# Patient Record
Sex: FEMALE | Race: WHITE | NOT HISPANIC OR LATINO | Employment: PART TIME | URBAN - METROPOLITAN AREA
[De-identification: names, ages, dates, MRNs, and addresses within clinical notes are randomized per-mention and may not be internally consistent; named-entity substitution may affect disease eponyms.]

---

## 2017-01-01 ENCOUNTER — HOME CARE/HOSPICE - HEALTHEAST (OUTPATIENT)
Dept: HOME HEALTH SERVICES | Facility: HOME HEALTH | Age: 27
End: 2017-01-01

## 2017-01-01 ENCOUNTER — TRANSFERRED RECORDS (OUTPATIENT)
Dept: HEALTH INFORMATION MANAGEMENT | Facility: CLINIC | Age: 27
End: 2017-01-01

## 2017-01-05 ENCOUNTER — TRANSFERRED RECORDS (OUTPATIENT)
Dept: HEALTH INFORMATION MANAGEMENT | Facility: CLINIC | Age: 27
End: 2017-01-05

## 2017-01-07 ENCOUNTER — COMMUNICATION - HEALTHEAST (OUTPATIENT)
Dept: OBGYN | Facility: CLINIC | Age: 27
End: 2017-01-07

## 2017-01-09 ENCOUNTER — AMBULATORY - HEALTHEAST (OUTPATIENT)
Dept: OBGYN | Facility: CLINIC | Age: 27
End: 2017-01-09

## 2017-01-09 DIAGNOSIS — N61.1 BREAST ABSCESS: ICD-10-CM

## 2017-01-11 ENCOUNTER — COMMUNICATION - HEALTHEAST (OUTPATIENT)
Dept: OBGYN | Facility: CLINIC | Age: 27
End: 2017-01-11

## 2017-01-11 ENCOUNTER — HOSPITAL ENCOUNTER (OUTPATIENT)
Dept: MAMMOGRAPHY | Facility: HOSPITAL | Age: 27
Discharge: HOME | End: 2017-01-11

## 2017-01-11 DIAGNOSIS — N61.1 BREAST ABSCESS: ICD-10-CM

## 2017-01-12 ENCOUNTER — TRANSFERRED RECORDS (OUTPATIENT)
Dept: HEALTH INFORMATION MANAGEMENT | Facility: CLINIC | Age: 27
End: 2017-01-12

## 2017-03-19 ENCOUNTER — TRANSFERRED RECORDS (OUTPATIENT)
Dept: HEALTH INFORMATION MANAGEMENT | Facility: CLINIC | Age: 27
End: 2017-03-19

## 2017-03-19 LAB
ALT SERPL-CCNC: 20 U/L (ref 0–45)
AST SERPL-CCNC: 20 U/L (ref 0–40)
CREAT SERPL-MCNC: 0.78 MG/DL (ref 0.6–1.1)
GFR SERPL CREATININE-BSD FRML MDRD: >60 ML/MIN/1.73M2
GLUCOSE SERPL-MCNC: 84 MG/DL (ref 70–125)
INR PPP: 1.1 (ref 0.9–1.1)
POTASSIUM SERPL-SCNC: 3.6 MMOL/L (ref 3.5–5)

## 2017-05-05 ENCOUNTER — ANESTHESIA - HEALTHEAST (OUTPATIENT)
Dept: SURGERY | Facility: CLINIC | Age: 27
End: 2017-05-05

## 2017-05-05 ENCOUNTER — SURGERY - HEALTHEAST (OUTPATIENT)
Dept: SURGERY | Facility: CLINIC | Age: 27
End: 2017-05-05

## 2017-05-05 ASSESSMENT — MIFFLIN-ST. JEOR: SCORE: 1370.86

## 2018-01-30 ENCOUNTER — HOSPITAL ENCOUNTER (OUTPATIENT)
Dept: ADMINISTRATIVE | Age: 28
Discharge: HOME | End: 2018-01-30

## 2018-01-30 ENCOUNTER — ANESTHESIA - HEALTHEAST (OUTPATIENT)
Dept: SURGERY | Facility: HOSPITAL | Age: 28
End: 2018-01-30

## 2018-01-30 ASSESSMENT — MIFFLIN-ST. JEOR: SCORE: 1423.03

## 2018-01-31 ENCOUNTER — SURGERY - HEALTHEAST (OUTPATIENT)
Dept: SURGERY | Facility: HOSPITAL | Age: 28
End: 2018-01-31

## 2018-01-31 ASSESSMENT — MIFFLIN-ST. JEOR: SCORE: 1432.55

## 2018-02-06 ENCOUNTER — COMMUNICATION - HEALTHEAST (OUTPATIENT)
Dept: SCHEDULING | Facility: CLINIC | Age: 28
End: 2018-02-06

## 2018-06-05 ENCOUNTER — HOSPITAL ENCOUNTER (INPATIENT)
Facility: CLINIC | Age: 28
LOS: 2 days | Discharge: HOME | End: 2018-06-07
Attending: FAMILY MEDICINE | Admitting: PSYCHIATRY & NEUROLOGY
Payer: COMMERCIAL

## 2018-06-05 DIAGNOSIS — G89.29 CHRONIC MIDLINE LOW BACK PAIN WITHOUT SCIATICA: ICD-10-CM

## 2018-06-05 DIAGNOSIS — F12.20 CANNABIS USE DISORDER, MODERATE, DEPENDENCE (H): ICD-10-CM

## 2018-06-05 DIAGNOSIS — M54.50 CHRONIC MIDLINE LOW BACK PAIN WITHOUT SCIATICA: ICD-10-CM

## 2018-06-05 DIAGNOSIS — F41.8 DEPRESSION WITH ANXIETY: Primary | ICD-10-CM

## 2018-06-05 DIAGNOSIS — R45.851 SUICIDAL IDEATION: ICD-10-CM

## 2018-06-05 LAB
AMPHETAMINES UR QL SCN: NEGATIVE
BARBITURATES UR QL: NEGATIVE
BENZODIAZ UR QL: POSITIVE
CANNABINOIDS UR QL SCN: POSITIVE
COCAINE UR QL: POSITIVE
ETHANOL UR QL SCN: NEGATIVE
HCG UR QL: NEGATIVE
OPIATES UR QL SCN: NEGATIVE

## 2018-06-05 PROCEDURE — 80307 DRUG TEST PRSMV CHEM ANLYZR: CPT | Performed by: FAMILY MEDICINE

## 2018-06-05 PROCEDURE — 81025 URINE PREGNANCY TEST: CPT | Performed by: FAMILY MEDICINE

## 2018-06-05 PROCEDURE — 90791 PSYCH DIAGNOSTIC EVALUATION: CPT

## 2018-06-05 PROCEDURE — 99285 EMERGENCY DEPT VISIT HI MDM: CPT | Mod: 25 | Performed by: FAMILY MEDICINE

## 2018-06-05 PROCEDURE — 25000132 ZZH RX MED GY IP 250 OP 250 PS 637: Performed by: FAMILY MEDICINE

## 2018-06-05 PROCEDURE — 80320 DRUG SCREEN QUANTALCOHOLS: CPT | Performed by: FAMILY MEDICINE

## 2018-06-05 PROCEDURE — 99285 EMERGENCY DEPT VISIT HI MDM: CPT | Mod: Z6 | Performed by: FAMILY MEDICINE

## 2018-06-05 PROCEDURE — 12400007 ZZH R&B MH INTERMEDIATE UMMC

## 2018-06-05 RX ORDER — HYDROXYZINE HYDROCHLORIDE 25 MG/1
25 TABLET, FILM COATED ORAL EVERY 4 HOURS PRN
Status: DISCONTINUED | OUTPATIENT
Start: 2018-06-05 | End: 2018-06-07 | Stop reason: HOSPADM

## 2018-06-05 RX ORDER — BISACODYL 10 MG
10 SUPPOSITORY, RECTAL RECTAL DAILY PRN
Status: DISCONTINUED | OUTPATIENT
Start: 2018-06-05 | End: 2018-06-07 | Stop reason: HOSPADM

## 2018-06-05 RX ORDER — OLANZAPINE 10 MG/1
10 TABLET ORAL
Status: DISCONTINUED | OUTPATIENT
Start: 2018-06-05 | End: 2018-06-06

## 2018-06-05 RX ORDER — TRAZODONE HYDROCHLORIDE 50 MG/1
50 TABLET, FILM COATED ORAL
Status: DISCONTINUED | OUTPATIENT
Start: 2018-06-05 | End: 2018-06-07 | Stop reason: HOSPADM

## 2018-06-05 RX ORDER — ACETAMINOPHEN 500 MG
1000 TABLET ORAL ONCE
Status: COMPLETED | OUTPATIENT
Start: 2018-06-05 | End: 2018-06-05

## 2018-06-05 RX ORDER — OLANZAPINE 10 MG/2ML
10 INJECTION, POWDER, FOR SOLUTION INTRAMUSCULAR
Status: DISCONTINUED | OUTPATIENT
Start: 2018-06-05 | End: 2018-06-06

## 2018-06-05 RX ORDER — ACETAMINOPHEN 325 MG/1
650 TABLET ORAL EVERY 4 HOURS PRN
Status: DISCONTINUED | OUTPATIENT
Start: 2018-06-05 | End: 2018-06-07 | Stop reason: HOSPADM

## 2018-06-05 RX ORDER — ALUMINA, MAGNESIA, AND SIMETHICONE 2400; 2400; 240 MG/30ML; MG/30ML; MG/30ML
30 SUSPENSION ORAL EVERY 4 HOURS PRN
Status: DISCONTINUED | OUTPATIENT
Start: 2018-06-05 | End: 2018-06-07 | Stop reason: HOSPADM

## 2018-06-05 RX ORDER — IBUPROFEN 800 MG/1
800 TABLET, FILM COATED ORAL ONCE
Status: COMPLETED | OUTPATIENT
Start: 2018-06-05 | End: 2018-06-05

## 2018-06-05 RX ADMIN — IBUPROFEN 800 MG: 800 TABLET, FILM COATED ORAL at 12:41

## 2018-06-05 RX ADMIN — ACETAMINOPHEN 1000 MG: 500 TABLET ORAL at 12:41

## 2018-06-05 ASSESSMENT — ACTIVITIES OF DAILY LIVING (ADL)
RETIRED_EATING: 0-->INDEPENDENT
SWALLOWING: 0-->SWALLOWS FOODS/LIQUIDS WITHOUT DIFFICULTY
FALL_HISTORY_WITHIN_LAST_SIX_MONTHS: NO
AMBULATION: 0-->INDEPENDENT
RETIRED_COMMUNICATION: 0-->UNDERSTANDS/COMMUNICATES WITHOUT DIFFICULTY
TRANSFERRING: 0-->INDEPENDENT
BATHING: 0-->INDEPENDENT
TOILETING: 0-->INDEPENDENT
DRESS: 0-->INDEPENDENT
COGNITION: 0 - NO COGNITION ISSUES REPORTED

## 2018-06-05 ASSESSMENT — ENCOUNTER SYMPTOMS
MYALGIAS: 0
COUGH: 0
NAUSEA: 0
VOMITING: 0
HALLUCINATIONS: 0
DYSPHORIC MOOD: 1
FEVER: 0
CHILLS: 0
DYSURIA: 0

## 2018-06-05 NOTE — IP AVS SNAPSHOT
[address]    [facility] [address]    Phone:  [phone]                                       After Visit Summary   6/5/2018    Cassie Morrison    MRN: [id number]           After Visit Summary Signature Page     I have received my discharge instructions, and my questions have been answered. I have discussed any challenges I see with this plan with the nurse or doctor.    ..........................................................................................................................................  Patient/Patient Representative Signature      ..........................................................................................................................................  Patient Representative Print Name and Relationship to Patient    ..................................................               ................................................  Date                                            Time    ..........................................................................................................................................  Reviewed by Signature/Title    ...................................................              ..............................................  Date                                                            Time

## 2018-06-05 NOTE — PHARMACY-ADMISSION MEDICATION HISTORY
Admission medication history for the June 5, 2018 admission is complete.     Interview sources:  patient, Care Everywhere    Reliability of source: good    Medication compliance: N/A - pt not taking medications at this time    Changes made to PTA medication list (reason)  Added: none  Deleted: none  Changed: none    Additional medication history information:   -Not taking Rx or OTC medications at this time  -Received yearly flu shot in January    Prior to Admission medications    Not on File     Time spent: 5 minutes    Medication history completed by:   Tricia Almanza, PD4 Student

## 2018-06-05 NOTE — PROGRESS NOTES
06/05/18 1726   Patient Belongings   Did you bring any home meds/supplements to the hospital?  No   Patient Belongings glasses;shoes;cell phone/electronics;clothing   Disposition of Belongings Kept with patient;Locker   Belongings Search Yes   Clothing Search Yes   Second Staff Hillary RAMSEY       IN LOCKER:    1. Orange T-shirt  2. A pair of blue Jeans trousers   3. A pair  of white sock  4. A black head band  5. A black hair tie  6. A pair shoes  7. Cell phone   A               Admission:  I am responsible for any personal items that are not sent to the safe or pharmacy.  [facility] is not responsible for loss, theft or damage of any property in my possession.    Signature:  _________________________________ Date: _______  Time: _____                                              Staff Signature:  ____________________________ Date: ________  Time: _____      2nd Staff person, if patient is unable/unwilling to sign:    Signature: ________________________________ Date: ________  Time: _____     Discharge:  [facility] has returned all of my personal belongings:    Signature: _________________________________ Date: ________  Time: _____                                          Staff Signature:  ____________________________ Date: ________  Time: _____
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
## 2018-06-05 NOTE — IP AVS SNAPSHOT
MRN:[id number]                      After Visit Summary   6/5/2018    Cassie Morrison    MRN: [id number]           Thank you!     Thank you for choosing [facility] for your care. Our goal is always to provide you with excellent care.        Patient Information     Date Of Birth          1990        Designated Caregiver       Most Recent Value    Caregiver    Will someone help with your care after discharge? no      About your hospital stay     You were admitted on:  June 5, 2018 You last received care in the:  [facility]    You were discharged on:  June 7, 2018       Who to Call     For medical emergencies, please call 911.  For non-urgent questions about your medical care, please call your primary care provider or clinic, None          Attending Provider     Provider Specialty    Tyler Camacho MD Emergency Medicine    David Castaneda MD Emergency Medicine    Tian, Cameron Hester MD Psychiatry       Primary Care Provider Fax #    Physician No Ref-Primary [phone]      Your next 10 appointments already scheduled     Jun 13, 2018 11:40 AM CDT   [facility] Visit with Priscilla Lange PA-C   [facility]    [address] [place] [address] [id number] [phone]           Bring a current list of meds and any records pertaining to this visit. For Physicals, please bring immunization records and any forms needing to be filled out. Please arrive 10 minutes early to complete paperwork.              Further instructions from your care team        Behavioral Discharge Planning and Instructions      Summary:  You were admitted on 6/5/2018  due to Suicidal Ideations.  You were treated by Dr. Cameron Overton MD and Dr. Byron Bui MD and discharged on 6/7/18 from [facility] Home.    Principal Diagnosis: Major depressive disorder, recurrent, severe without psychotic features    Health Care Follow-up Appointments:   [address] [place] [address]    Phone: [phone]  *You have a post-hospitalization/PCP visit with Priscilla Lange PA-C scheduled for Wednesday, June 13th at 11:40 am   [facility]. [facility], [place] [address]  Phone:  [phone] Fax: [phone] HUC PLEASE FAX DISCHARGE INSTRUCTIONS   *You have an intake for individual psychotherapy with Alize Escobardylonmarielos for Thursday, June 14th at 1:00 pm, please arrive 10 minutes prior to your appointment to complete initial intake paperwork.   Attend all scheduled appointments with your outpatient providers. Call at least 24 hours in advance if you need to reschedule an appointment to ensure continued access to your outpatient providers.   Major Treatments, Procedures and Findings:  You were provided with: a psychiatric assessment, assessed for medical stability, medication evaluation and/or management, group therapy and milieu management    Symptoms to Report: feeling more aggressive, increased confusion, losing more sleep, mood getting worse or thoughts of suicide    Early warning signs can include: increased depression or anxiety sleep disturbances increased thoughts or behaviors of suicide or self-harm  increased unusual thinking, such as paranoia or hearing voices    Safety and Wellness:  Take all medicines as directed.  Make no changes unless your doctor suggests them.      Follow treatment recommendations.  Refrain from alcohol and non-prescribed drugs.  Ask your support system to help you reduce your access to items that could harm yourself or others. If there is a concern for safety, call 911.    Resources:   Crisis Intervention: 800.277.8429 or 038-133-7270 (TTY: 252.799.5597).  Call anytime for help.  National [place] on Mental Illness (www.[place].keerthi.org): 703.733.3330 or 325-400-0130.  Alcoholics Anonymous (www.alcoholics-anonymous.org): Check your phone book for your local chapter.  Suicide Awareness Voices of Education (SAVE) (www.save.org):  "888-511-SAVE (7283)  National Suicide Prevention Line (www.mentalhealthmn.org): 408-957-SJLX (0102)  Mental Health Consumer/Survivor Network of [place] (www.mhcsn.net): 917.580.8185 or 625-671-0130  Mental Health Association of [place] (www.mentalhealth.org): 698.317.2022 or 817-826-3228  Self- Management and Recovery Training., SMART-- Toll free: 674.551.1786  [employer]  Text 4 Life: txt \"LIFE\" to 35411 for immediate support and crisis intervention  Crisis text line: Text \"[place]\" to 831193. Free, confidential, .  Crisis Intervention: 716.966.6284 or 049-132-1469. Call anytime for help.     The treatment team has appreciated the opportunity to work with you.     Please take care and make your recovery a daily recovery.   If you have any questions or concerns our unit number is [phone].  Thank you.         Pending Results     No orders found from 6/3/2018 to 2018.            Admission Information     Date & Time Provider Department Dept. Phone    2018 Cameron Overton MD [address] [phone]      Your Vitals Were     Blood Pressure Pulse Temperature Respirations Weight Last Period    132/77 81 99  F (37.2  C) (Oral) 16 64.5 kg (142 lb 4.8 oz) 04/10/2016 (Exact Date)    Pulse Oximetry BMI (Body Mass Index)                97% 22.29 kg/m2          [employer] Information     [employer] lets you send messages to your doctor, view your test results, renew your prescriptions, schedule appointments and more. To sign up, go to [email or web address] . Click on \"Log in\" on the left side of the screen, which will take you to the Welcome page. Then click on \"Sign up Now\" on the right side of the page.     You will be asked to enter the access code listed below, as well as some personal information. Please follow the directions to create your username and password.     Your access code is: [email or web address]  Expires: 2018  3:22 PM     Your access code will  in 90 days. If you need help or a new code, please " call your [facility] clinic or [phone].        Care EveryWhere ID     This is your Care EveryWhere ID. This could be used by other organizations to access your [facility] medical records  [id number]        Equal Access to Services     ASUNCION DE LA ROSA : Hadii aad ku haddylono Somagalyali, waaxda luqadaha, qaybta kaalmada adeegyada, garrett harp michael aparicio. So [facility] [phone].    ATENCIÓN: Si habla español, tiene a pond disposición servicios gratuitos de asistencia lingüística. Llame al [phone].    We comply with applicable federal civil rights laws and [place] laws. We do not discriminate on the basis of race, color, national origin, age, disability, sex, sexual orientation, or gender identity.               Review of your medicines      START taking        Dose / Directions    ibuprofen 600 MG tablet   Commonly known as:  ADVIL/MOTRIN        Dose:  600 mg   Take 1 tablet (600 mg) by mouth 3 times daily   Quantity:  60 tablet   Refills:  0       tiZANidine 2 MG tablet   Commonly known as:  ZANAFLEX        Dose:  2 mg   Take 1 tablet (2 mg) by mouth every 6 hours as needed for muscle spasms   Quantity:  14 tablet   Refills:  0       venlafaxine 75 MG Tb24 24 hr tablet   Commonly known as:  EFFEXOR-ER   Used for:  Depression with anxiety        Dose:  75 mg   Start taking on:  6/8/2018   Take 1 tablet (75 mg) by mouth daily   Quantity:  30 each   Refills:  1            Where to get your medicines      These medications were sent to [facility] [address]  [address] [address]     Phone:  [phone]     ibuprofen 600 MG tablet    tiZANidine 2 MG tablet    venlafaxine 75 MG Tb24 24 hr tablet                Protect others around you: Learn how to safely use, store and throw away your medicines at www.disposemymeds.org.             Medication List: This is a list of all your medications and when to take them. Check marks below indicate  your daily home schedule. Keep this list as a reference.      Medications           Morning Afternoon Evening Bedtime As Needed    ibuprofen 600 MG tablet   Commonly known as:  ADVIL/MOTRIN   Take 1 tablet (600 mg) by mouth 3 times daily   Last time this was given:  600 mg on 6/7/2018  3:28 PM                                tiZANidine 2 MG tablet   Commonly known as:  ZANAFLEX   Take 1 tablet (2 mg) by mouth every 6 hours as needed for muscle spasms   Last time this was given:  2 mg on 6/7/2018 12:10 PM                                venlafaxine 75 MG Tb24 24 hr tablet   Commonly known as:  EFFEXOR-ER   Take 1 tablet (75 mg) by mouth daily   Start taking on:  6/8/2018   Last time this was given:  75 mg on 6/7/2018  9:35 AM

## 2018-06-06 PROBLEM — F60.3 BORDERLINE PERSONALITY DISORDER (H): Status: ACTIVE | Noted: 2018-06-06

## 2018-06-06 PROBLEM — F14.20 COCAINE USE DISORDER, MODERATE, DEPENDENCE (H): Status: ACTIVE | Noted: 2018-06-06

## 2018-06-06 PROBLEM — F12.20 CANNABIS USE DISORDER, MODERATE, DEPENDENCE (H): Status: ACTIVE | Noted: 2018-06-06

## 2018-06-06 PROBLEM — F43.25 ADJUSTMENT DISORDER WITH MIXED DISTURBANCE OF EMOTIONS AND CONDUCT: Status: ACTIVE | Noted: 2018-06-06

## 2018-06-06 PROBLEM — R45.851 SUICIDAL IDEATION: Status: ACTIVE | Noted: 2018-06-06

## 2018-06-06 LAB
ALBUMIN SERPL-MCNC: 3.9 G/DL (ref 3.4–5)
ALP SERPL-CCNC: 42 U/L (ref 40–150)
ALT SERPL W P-5'-P-CCNC: 17 U/L (ref 0–50)
ANION GAP SERPL CALCULATED.3IONS-SCNC: 9 MMOL/L (ref 3–14)
AST SERPL W P-5'-P-CCNC: 15 U/L (ref 0–45)
BASOPHILS # BLD AUTO: 0 10E9/L (ref 0–0.2)
BASOPHILS NFR BLD AUTO: 0.2 %
BILIRUB SERPL-MCNC: 0.6 MG/DL (ref 0.2–1.3)
BUN SERPL-MCNC: 13 MG/DL (ref 7–30)
CALCIUM SERPL-MCNC: 8.8 MG/DL (ref 8.5–10.1)
CHLORIDE SERPL-SCNC: 111 MMOL/L (ref 94–109)
CHOLEST SERPL-MCNC: 128 MG/DL
CO2 SERPL-SCNC: 19 MMOL/L (ref 20–32)
CREAT SERPL-MCNC: 0.92 MG/DL (ref 0.52–1.04)
DIFFERENTIAL METHOD BLD: NORMAL
EOSINOPHIL # BLD AUTO: 0.1 10E9/L (ref 0–0.7)
EOSINOPHIL NFR BLD AUTO: 1.5 %
ERYTHROCYTE [DISTWIDTH] IN BLOOD BY AUTOMATED COUNT: 13 % (ref 10–15)
GFR SERPL CREATININE-BSD FRML MDRD: 71 ML/MIN/1.7M2
GLUCOSE SERPL-MCNC: 86 MG/DL (ref 70–99)
HCT VFR BLD AUTO: 40.1 % (ref 35–47)
HDLC SERPL-MCNC: 62 MG/DL
HGB BLD-MCNC: 13.4 G/DL (ref 11.7–15.7)
IMM GRANULOCYTES # BLD: 0 10E9/L (ref 0–0.4)
IMM GRANULOCYTES NFR BLD: 0.2 %
LDLC SERPL CALC-MCNC: 53 MG/DL
LYMPHOCYTES # BLD AUTO: 1.5 10E9/L (ref 0.8–5.3)
LYMPHOCYTES NFR BLD AUTO: 33.6 %
MCH RBC QN AUTO: 30.8 PG (ref 26.5–33)
MCHC RBC AUTO-ENTMCNC: 33.4 G/DL (ref 31.5–36.5)
MCV RBC AUTO: 92 FL (ref 78–100)
MONOCYTES # BLD AUTO: 0.4 10E9/L (ref 0–1.3)
MONOCYTES NFR BLD AUTO: 8.2 %
NEUTROPHILS # BLD AUTO: 2.5 10E9/L (ref 1.6–8.3)
NEUTROPHILS NFR BLD AUTO: 56.3 %
NONHDLC SERPL-MCNC: 66 MG/DL
NRBC # BLD AUTO: 0 10*3/UL
NRBC BLD AUTO-RTO: 0 /100
PLATELET # BLD AUTO: 213 10E9/L (ref 150–450)
POTASSIUM SERPL-SCNC: 4.2 MMOL/L (ref 3.4–5.3)
PROT SERPL-MCNC: 8.5 G/DL (ref 6.8–8.8)
RBC # BLD AUTO: 4.35 10E12/L (ref 3.8–5.2)
SODIUM SERPL-SCNC: 139 MMOL/L (ref 133–144)
TRIGL SERPL-MCNC: 63 MG/DL
TSH SERPL DL<=0.005 MIU/L-ACNC: 2.46 MU/L (ref 0.4–4)
WBC # BLD AUTO: 4.5 10E9/L (ref 4–11)

## 2018-06-06 PROCEDURE — 25000132 ZZH RX MED GY IP 250 OP 250 PS 637: Performed by: PSYCHIATRY & NEUROLOGY

## 2018-06-06 PROCEDURE — 90853 GROUP PSYCHOTHERAPY: CPT

## 2018-06-06 PROCEDURE — 99232 SBSQ HOSP IP/OBS MODERATE 35: CPT | Performed by: PHYSICIAN ASSISTANT

## 2018-06-06 PROCEDURE — 80053 COMPREHEN METABOLIC PANEL: CPT | Performed by: PSYCHIATRY & NEUROLOGY

## 2018-06-06 PROCEDURE — 99223 1ST HOSP IP/OBS HIGH 75: CPT | Mod: AI | Performed by: PSYCHIATRY & NEUROLOGY

## 2018-06-06 PROCEDURE — 12400007 ZZH R&B MH INTERMEDIATE UMMC

## 2018-06-06 PROCEDURE — 36415 COLL VENOUS BLD VENIPUNCTURE: CPT | Performed by: PSYCHIATRY & NEUROLOGY

## 2018-06-06 PROCEDURE — 84443 ASSAY THYROID STIM HORMONE: CPT | Performed by: PSYCHIATRY & NEUROLOGY

## 2018-06-06 PROCEDURE — 80061 LIPID PANEL: CPT | Performed by: PSYCHIATRY & NEUROLOGY

## 2018-06-06 PROCEDURE — 25000132 ZZH RX MED GY IP 250 OP 250 PS 637: Performed by: PHYSICIAN ASSISTANT

## 2018-06-06 PROCEDURE — 85025 COMPLETE CBC W/AUTO DIFF WBC: CPT | Performed by: PSYCHIATRY & NEUROLOGY

## 2018-06-06 PROCEDURE — 99207 ZZC CONSULT E&M CHANGED TO SUBSEQUENT LEVEL: CPT | Performed by: PHYSICIAN ASSISTANT

## 2018-06-06 RX ORDER — LIDOCAINE 4 G/G
1-3 PATCH TOPICAL
Status: DISCONTINUED | OUTPATIENT
Start: 2018-06-06 | End: 2018-06-07 | Stop reason: HOSPADM

## 2018-06-06 RX ORDER — IBUPROFEN 600 MG/1
600 TABLET, FILM COATED ORAL 3 TIMES DAILY
Status: DISCONTINUED | OUTPATIENT
Start: 2018-06-06 | End: 2018-06-07 | Stop reason: HOSPADM

## 2018-06-06 RX ORDER — VENLAFAXINE HYDROCHLORIDE 75 MG/1
75 TABLET, EXTENDED RELEASE ORAL DAILY
Status: DISCONTINUED | OUTPATIENT
Start: 2018-06-06 | End: 2018-06-07 | Stop reason: HOSPADM

## 2018-06-06 RX ORDER — TIZANIDINE 2 MG/1
2 TABLET ORAL EVERY 6 HOURS PRN
Status: DISCONTINUED | OUTPATIENT
Start: 2018-06-06 | End: 2018-06-07 | Stop reason: HOSPADM

## 2018-06-06 RX ADMIN — IBUPROFEN 600 MG: 600 TABLET ORAL at 15:21

## 2018-06-06 RX ADMIN — VENLAFAXINE HYDROCHLORIDE 75 MG: 75 TABLET, EXTENDED RELEASE ORAL at 13:55

## 2018-06-06 RX ADMIN — ACETAMINOPHEN 650 MG: 325 TABLET, FILM COATED ORAL at 10:01

## 2018-06-06 RX ADMIN — LIDOCAINE 1 PATCH: 560 PATCH PERCUTANEOUS; TOPICAL; TRANSDERMAL at 20:18

## 2018-06-06 RX ADMIN — ACETAMINOPHEN 650 MG: 325 TABLET, FILM COATED ORAL at 04:07

## 2018-06-06 ASSESSMENT — ACTIVITIES OF DAILY LIVING (ADL)
DRESS: INDEPENDENT
ORAL_HYGIENE: INDEPENDENT
GROOMING: INDEPENDENT

## 2018-06-06 NOTE — PLAN OF CARE
Problem: Patient Care Overview  Goal: Team Discussion  Team Plan:   BEHAVIORAL TEAM DISCUSSION    Participants: Scarlet MARTINES, Dr. Cameron Overton MD, and Beverly CAROLINA RN   Progress: Initial behavioral team discussion.   Continued Stay Criteria/Rationale: Pt admitted voluntary due to increased suicidal ideation.   Medical/Physical: See medical consult, if applicable.   Precautions:   Behavioral Orders   Procedures     Code 1 - Restrict to Unit     Routine Programming     As clinically indicated     Status 15     Every 15 minutes.     Suicide precautions     Patients on Suicide Precautions should have a Combination Diet ordered that includes a Diet selection(s) AND a Behavioral Tray selection for Safe Tray - with utensils, or Safe Tray - NO utensils       Plan: The plan is to assess the patient for mental health and medication needs.  The patient will be prescribed medications to treat the identified symptoms.  Upon discharge the patient will be referred to services as appropriate based on the assessment.  Rationale for change in precautions or plan: Initial plan of care
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
## 2018-06-06 NOTE — CONSULTS
Internal Medicine Initial Visit      Cassie Morrison MRN# [id number]   YOB: 1990 Age: 27 year old   Date of Admission: 6/5/2018  PCP: No Ref-Primary, Physician    Referring Provider: Behavioral Health - Cameron Overton MD  Reason for Visit: Back pain          Assessment and Recommendations:   Cassie Morrison is a 27 year old woman with a history of chronic low back pain, endometriosis s/p hysterectomy, and remote history of depression who is admitted to station 10 with suicidal ideation.        Suicidal ideation: Management per psychiatry team.     Acute on chronic low back pain: Long-standing history of low back pain secondary to hockey-related injury in high school and herniated disc in 2016. In current flare for past 2 days, symptoms similar to flares that have been occurring since disc herniation. No signs/symptoms concerning for infection or emergent cord compression. Pain most consistent with muscle spasm vs disc herniation. Patient would like to resume her home pain regimen of lidocaine, ibuprofen.    - Recommend ibuprofen 600 mg scheduled TID, take with food   - Lidocaine patch over low back    - Trial Zanaflex PRN    - Continue Tylenol PRN    No further medicine recommendations at this time. Medicine signing off. Please page with any additional concerns.     Jesse Canas PA-C  Internal Medicine Hospitalist   [facility]  [phone]      Reason for Admission:   Suicidal ideation         History of Present Illness:   History is obtained from the patient and medical record.     This patient is a 27 year old female with a history of chronic low back pain, endometriosis s/p hysterectomy in 01/2018, and remote history of depression admitted to behavioral health for suicidal ideation with plans to drive her car off a bridge in the context of recent breakup and loss of custody of her 17-month-old child.     Internal Medicine service was asked to see patient for a general  medication evaluation. Currently, patient has 7-8/10 low back pain. She has a history of chronic low back pain secondary to a hockey-related injury in high school. Her pain subsided over the years but worsened following a lumbar disc herniation 2 years ago. Since the disc herniation she has been having flares of low-back pain that last for 3-4 days, occurring several times per year. Pain is triggered by physical activity and sleeping on her back or a hard surface. She manages the pain flares with ibuprofen, lidocaine patches, and Tiger balm. She rarely has gone to clinic or the ED for the back pain, for which she has received Flexeril and this made her very nauseous. She denies taking opioids for her back pain, but has taken this for endometriosis-related pain. No history of back surgeries, recent trauma.     Current back pain flare started 2 days ago and has been worsening since pain onset. Pain is bilateral in the lower lumbar region, left worse than right. She has associated paresthesias extending from the left hip to knee. Pain is constant and made worse with movement and laying flat, improves with spine flexion. No weakness, other paresthesias, loss of bowel or bladder function, headaches, or fever/chills. She has tried Tylenol today with minimal relief. The pain is exactly the same as her previous flares. She otherwise reports feeling well and has no other medical concerns at this time.             Review of Systems:   Constitutional: negative for fever/chills or recent weight changes   Eyes: negative for vision changes   Ears/Nose/Throat: negative for ear pain, sore throat, nasal or sinus congestion   Cardiovascular: negative for chest pain or palpitations   Respiratory: negative for cough or shortness of breath   Gastrointestinal: negative for abdominal pain, N/V, diarrhea or constipation   Genitourinary: negative for dysuria, urinary urgency or frequency, incontinence  Musculoskeletal: negative for joint  pain   Neurologic: negative for headaches  Skin: negative for rashes or wounds   Endocrine: negative for polydipsia, polyuria          Past Medical History:   Reviewed and updated in Epic.   Past Medical History:   Diagnosis Date     Chickenpox     x2     Endometriosis      Heart murmur      IUD migration (H)      Papanicolaou smear of cervix with low grade squamous intraepithelial lesion (LGSIL) 12     Suicidal ideation 2015             Past Surgical History:   Reviewed and updated in Epic.   Past Surgical History:   Procedure Laterality Date     C ORAL SURGERY PROCEDURE      wisdom teeth x 5     COLONOSCOPY       DILATION AND CURETTAGE SUCTION, TREAT INCOMPLETE       D&E     DILATION AND CURETTAGE, OPERATIVE HYSTEROSCOPY, COMBINED N/A 2015    D&C HSC, hysterectomy-2018     HC LAPAROSCOPIC IUD REMOVAL  2015     LAPAROSCOPY  2010    endometriosis     LASER CO2 LAPAROSCOPIC VAPORIZATION ENDOMETRIUM N/A 2015    vaporization of endometriosis             Social History:     Social History     Social History     Marital status: Single     Spouse name: N/A     Number of children: N/A     Years of education: N/A     Occupational History     Not on file.     Social History Main Topics     Smoking status: Current Every Day Smoker     Packs/day: 0.10     Types: Cigarettes     Smokeless tobacco: Never Used      Comment: smoking 10cig/day- down to 1-2 with pregnancy     Alcohol use 0.0 oz/week     0 Standard drinks or equivalent per week      Comment: 3 times a month     Drug use: No     Sexual activity: Yes     Partners: Male     Birth control/ protection: Female Surgical      Comment: Pregnant     Other Topics Concern     Parent/Sibling W/ Cabg, Mi Or Angioplasty Before 65f 55m? No     Social History Narrative              Family History:     Family History   Problem Relation Age of Onset     Blood Disease Paternal Grandfather      anemia     CANCER Paternal Grandfather      lung  and skin     DIABETES Paternal Grandmother      CEREBROVASCULAR DISEASE Father      also brain aneurysm     CEREBROVASCULAR DISEASE Paternal Grandfather      also MI     Depression Mother      Alcohol/Drug Mother      meth     Alcohol/Drug Father      drugs             Allergies:     Allergies   Allergen Reactions     Blood Transfusion Related (Informational Only) Other (See Comments)     Patient has a history of a clinically significant antibody against RBC antigens.  A delay in compatible RBCs may occur.     Morphine Unknown     Nerve pain     [place]              Medications:     No prescriptions prior to admission.        Current Facility-Administered Medications   Medication     acetaminophen (TYLENOL) tablet 650 mg     alum & mag hydroxide-simethicone (MYLANTA ES/MAALOX  ES) suspension 30 mL     bisacodyl (DULCOLAX) Suppository 10 mg     hydrOXYzine (ATARAX) tablet 25 mg     magnesium hydroxide (MILK OF MAGNESIA) suspension 30 mL     nicotine polacrilex (NICORETTE) gum 4-8 mg     traZODone (DESYREL) tablet 50 mg     venlafaxine (EFFEXOR-ER) 24 hr tablet 75 mg            Physical Exam:   Blood pressure 132/77, pulse 81, temperature 98.2  F (36.8  C), resp. rate 16, weight 64.8 kg (142 lb 12.8 oz), last menstrual period 04/10/2016, SpO2 97 %, not currently breastfeeding.  Body mass index is 22.37 kg/(m^2).  GENERAL: Alert and oriented x 3. In no acute distress.   HEENT: Anicteric sclera. Neck supple without lymphadenopathy or thyromegaly.   CV: RRR. S1, S2. No murmurs appreciated.   RESPIRATORY: Effort normal on room air. Lungs CTAB with no wheezing, rales, rhonchi.   GI: Abdomen soft, non distended, non tender. Normoactive bowel sounds. No masses, organomegaly.   MUSCULOSKELETAL: Mild-moderate tenderness over lumbar paraspinal muscles bilaterally, left > right. Mild tenderness when palpating lumbar spine. Spine without deformities, step-off. Positive straight leg raise on left. No joint swelling or  tenderness.  NEUROLOGICAL: No focal deficits. Moves all extremities. CN II-XII intact. Strength 5/5 in upper and lower extremities bilaterally.   EXTREMITIES: No peripheral edema. Intact bilateral pedal pulses.   SKIN: No jaundice. No rashes.           Data:   CBC:  Recent Labs   Lab Test  06/06/18   0800   WBC  4.5   RBC  4.35   HGB  13.4   HCT  40.1   MCV  92   MCH  30.8   MCHC  33.4   RDW  13.0   PLT  213       CMP:  Recent Labs   Lab Test  06/06/18   0800   NA  139   POTASSIUM  4.2   CHLORIDE  111*   CHINO  8.8   CO2  19*   BUN  13   CR  0.92   GLC  86   AST  15   ALT  17   BILITOTAL  0.6   ALBUMIN  3.9   PROTTOTAL  8.5   ALKPHOS  42       TSH:  TSH   Date Value Ref Range Status   06/06/2018 2.46 0.40 - 4.00 mU/L Final       Tox screen: Positive for cocaine, cannabinoids, benzodiazepines.   HCG: Negative

## 2018-06-06 NOTE — PLAN OF CARE
Problem: Patient Care Overview  Goal: Individualization & Mutuality  Personal Plan of Care    Reasons you are in the Hospital  1. Suicide/suicidal  2. Anxiety under control   3.  4.    Goals for Discharge   1. Better grasp on emotions   2. Deal with life stressors/pickup life backup again  3.

## 2018-06-06 NOTE — PROGRESS NOTES
Writer called and scheduled the following PCP appointment for Pt, information also located on AVS:    [facility] [address] [place] [address]   Phone: [phone]  *You have a post-hospitalization/PCP visit with Priscilla Lange PA-C scheduled for Wednesday, June 13th at 11:40 am

## 2018-06-06 NOTE — PROGRESS NOTES
06/06/18 1207   Behavioral Health   Thoughts/Cognition (WDL) WDL   Hallucinations denies / not responding to hallucinations   Thinking intact   Orientation person: oriented;place: oriented;date: oriented   Memory baseline memory   Insight insight appropriate to situation   Judgement intact   Eye Contact at examiner   Affect/Mood (WDL) WDL   Affect other (see comments)  (calm, content)   Mood mood is calm   ADL Assessment (WDL) WDL   Physical Appearance/Attire appears stated age;attire appropriate to age and situation   Hygiene well groomed   Suicidality (WDL) WDL   Suicidality other (see comments)  (pt denies)   1. Wish to be Dead No   2. Non-Specific Active Suicidal Thoughts  No   Self Injury other (see comment)  (pt denies)   Elopement (WDL) WDL   Activity (WDL) WDL   Speech (WDL) WDL   Speech clear;coherent     Pt was calm, cooperative on this shift. Goal was to talk to doctor. Social and visible in the milieu. Attending some groups. Calm affect. Pleasant upon approach. Anxious at an 8/10. Depressed at a 7/10. Had some racing thoughts only when talking to . Showered in the morning. Feeling a little creeped out about another patient pacing halls. Anxious about visitor later. No other safety concerns (SI/SIB) stated or observed.
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
## 2018-06-07 VITALS
HEART RATE: 81 BPM | RESPIRATION RATE: 16 BRPM | TEMPERATURE: 99 F | BODY MASS INDEX: 22.29 KG/M2 | OXYGEN SATURATION: 97 % | SYSTOLIC BLOOD PRESSURE: 132 MMHG | DIASTOLIC BLOOD PRESSURE: 77 MMHG | WEIGHT: 142.3 LBS

## 2018-06-07 PROBLEM — M54.50 CHRONIC MIDLINE LOW BACK PAIN WITHOUT SCIATICA: Status: ACTIVE | Noted: 2018-06-07

## 2018-06-07 PROBLEM — G89.29 CHRONIC MIDLINE LOW BACK PAIN WITHOUT SCIATICA: Status: ACTIVE | Noted: 2018-06-07

## 2018-06-07 PROCEDURE — 25000132 ZZH RX MED GY IP 250 OP 250 PS 637: Performed by: PSYCHIATRY & NEUROLOGY

## 2018-06-07 PROCEDURE — 25000132 ZZH RX MED GY IP 250 OP 250 PS 637: Performed by: PHYSICIAN ASSISTANT

## 2018-06-07 PROCEDURE — 99238 HOSP IP/OBS DSCHRG MGMT 30/<: CPT | Performed by: PSYCHIATRY & NEUROLOGY

## 2018-06-07 RX ORDER — VENLAFAXINE HYDROCHLORIDE 75 MG/1
75 TABLET, EXTENDED RELEASE ORAL DAILY
Qty: 30 EACH | Refills: 1 | Status: SHIPPED | OUTPATIENT
Start: 2018-06-08 | End: 2023-11-02

## 2018-06-07 RX ORDER — IBUPROFEN 600 MG/1
600 TABLET, FILM COATED ORAL 3 TIMES DAILY
Qty: 60 TABLET | Refills: 0 | Status: SHIPPED | OUTPATIENT
Start: 2018-06-07 | End: 2023-09-09

## 2018-06-07 RX ORDER — TIZANIDINE 2 MG/1
2 TABLET ORAL EVERY 6 HOURS PRN
Qty: 14 TABLET | Refills: 0 | Status: SHIPPED | OUTPATIENT
Start: 2018-06-07 | End: 2023-11-02

## 2018-06-07 RX ADMIN — TIZANIDINE 2 MG: 2 TABLET ORAL at 12:10

## 2018-06-07 RX ADMIN — IBUPROFEN 600 MG: 600 TABLET ORAL at 15:28

## 2018-06-07 RX ADMIN — VENLAFAXINE HYDROCHLORIDE 75 MG: 75 TABLET, EXTENDED RELEASE ORAL at 09:35

## 2018-06-07 RX ADMIN — IBUPROFEN 600 MG: 600 TABLET ORAL at 09:35

## 2018-06-07 ASSESSMENT — ACTIVITIES OF DAILY LIVING (ADL)
ORAL_HYGIENE: INDEPENDENT
LAUNDRY: WITH SUPERVISION
DRESS: INDEPENDENT
GROOMING: INDEPENDENT

## 2018-06-07 NOTE — PLAN OF CARE
"Problem: Mood Impairment (Depressive Signs/Symptoms) (Adult)  Goal: Improved Mood Symptoms (Depressive Signs/Symptoms)  Outcome: Completed Date Met: 06/07/18  \"I'm a lot better.  I know seeing a therapist will be helpful and starting on a new antidepressant medication that can be increased if I need it will help.  I now realize what a good support network I have and how they do love and care about me.  I don't feel worthless now and I'm not thinking that my kids will be better off without me.\"  Indicates depression is less rating this at a 5 on a scale with 10 being the worst.  Rates anxiety at a 7 on the same scale, this has also improved. \"The triggers for my anxiety are now calmer.\"  Denies suicidal ideation, thoughts to be dead or self injurious thoughts.  States thoughts are \"more organized and less racing\", focus and concentration are much improved.  \"I'm able to sit and color now where I couldn't do this before.  Continue to experience difficulty sleeping stating this has been a problem \"for a long while\".  Plans to work with a therapist and a primary care physician for medication management.  Was encouraged to discuss sleeping difficulties with this physician.  Will be staying with uncle until able to find her own apartment.  Will return to work and resume caring for two children.  Has been visible in milieu, attended community meeting, but not other programming.  Has remained in lounge watching TV during free time or working on a word puzzle.      "

## 2018-06-07 NOTE — PROGRESS NOTES
"   06/06/18 8401   Behavioral Health   Hallucinations denies / not responding to hallucinations   Thinking intact   Orientation person: oriented;place: oriented;date: oriented;time: oriented   Memory baseline memory   Insight insight appropriate to situation   Judgement intact   Eye Contact at examiner   Affect full range affect   Mood mood is calm   Physical Appearance/Attire attire appropriate to age and situation   Hygiene well groomed   Suicidality other (see comments)  (Denies)   Wish to be Dead Description (no)   Non-Specific Active Suicidal Thought Description (no)   Self Injury other (see comment)  (denies)   Elopement (none observed)   Activity other (see comment)  (visible in the milieu)   Speech clear;coherent   Medication Sensitivity no stated side effects;no observed side effects   Psychomotor / Gait balanced   Coping/Psychosocial   Verbalized Emotional State anxiety   Activities of Daily Living   Hygiene/Grooming independent   Oral Hygiene independent   Dress independent   Room Organization independent   Activity   Activity Assistance Provided independent       Pt was visible in the milieu, watched TV and socialized with peers.  Denies, SI, SIB and hallucinations.   Stated that, \"she was feeling anxious.\"   "

## 2018-06-07 NOTE — DISCHARGE INSTRUCTIONS
Behavioral Discharge Planning and Instructions      Summary:  You were admitted on 6/5/2018  due to Suicidal Ideations.  You were treated by Dr. Cameron Overton MD and Dr. Byron Bui MD and discharged on 6/7/18 from [facility] to Home.    Principal Diagnosis: Major depressive disorder, recurrent, severe without psychotic features    Health Care Follow-up Appointments:   [place] [address] [facility] [place] [address]   Phone: [phone]  *You have a post-hospitalization/PCP visit with Priscilla Lange PA-C scheduled for Wednesday, June 13th at 11:40 am   [facility]. [address] [place] [address] [address], [place] [address]  Phone:  [phone] Fax: [phone] HUC PLEASE FAX DISCHARGE INSTRUCTIONS   *You have an intake for individual psychotherapy with Alize Trimble for Thursday, June 14th at 1:00 pm, please arrive 10 minutes prior to your appointment to complete initial intake paperwork.   Attend all scheduled appointments with your outpatient providers. Call at least 24 hours in advance if you need to reschedule an appointment to ensure continued access to your outpatient providers.   Major Treatments, Procedures and Findings:  You were provided with: a psychiatric assessment, assessed for medical stability, medication evaluation and/or management, group therapy and milieu management    Symptoms to Report: feeling more aggressive, increased confusion, losing more sleep, mood getting worse or thoughts of suicide    Early warning signs can include: increased depression or anxiety sleep disturbances increased thoughts or behaviors of suicide or self-harm  increased unusual thinking, such as paranoia or hearing voices    Safety and Wellness:  Take all medicines as directed.  Make no changes unless your doctor suggests them.      Follow treatment recommendations.  Refrain from alcohol and non-prescribed drugs.  Ask your support system to help you reduce your access to items that  "could harm yourself or others. If there is a concern for safety, call 911.    Resources:   Crisis Intervention: 387.191.9685 or 123-624-6671 (TTY: 470.609.7661).  Call anytime for help.  National [place] on Mental Illness (www.[place].keertih.org): 345.180.3122 or 186-734-7630.  Alcoholics Anonymous (www.alcoholics-anonymous.org): Check your phone book for your local chapter.  Suicide Awareness Voices of Education (SAVE) (www.save.org): 620-312-NBUE (7994)  National Suicide Prevention Line (www.mentalhealthmn.org): 529-750-AHBE (7667)  Mental Health Consumer/Survivor Network of [place] (www.mhcsn.net): 267.614.3430 or 614-665-1494  Mental Health Association of [place] (www.mentalhealth.org): 834.316.8782 or 604-006-2167  Self- Management and Recovery Training., SMART-- Toll free: 250.405.2028  www.[place].Barre  Text 4 Life: txt \"LIFE\" to 47460 for immediate support and crisis intervention  Crisis text line: Text \"[place]\" to 598736. Free, confidential, 24/7.  Crisis Intervention: 491.729.7164 or 582-511-0278. Call anytime for help.     The treatment team has appreciated the opportunity to work with you.     Please take care and make your recovery a daily recovery.   If you have any questions or concerns our unit number is [phone].  Thank you.       "

## 2018-06-07 NOTE — PROGRESS NOTES
Writer met with Pt who reported she would like to have an individual therapist in [facility]. Pt signed ELLIS for [facility]. Writer scheduled the following intake for individual therapy services, information also located on AVS.     [facility]. [address] [place] [address] [address], [place] [address]  Phone:  [phone] Fax: [phone] HUC PLEASE FAX DISCHARGE INSTRUCTIONS   *You have an intake for individual psychotherapy with Alize Trimble for Thursday, June 14th at 1:00 pm, please arrive 10 minutes prior to your appointment to complete initial intake paperwork.

## 2018-06-07 NOTE — PROGRESS NOTES
Patient discharged to home at 1605. Patient given belongings and discharge medications. Understood medications as prescribed. No other concerns.

## 2018-06-07 NOTE — PROVIDER NOTIFICATION
Georgia signs a 12-hour intent to leave. Denies SI/SIB. Deb Naegele notified. Pt agrees to wait to be seen by her doctor tomorrow morning.

## 2018-06-07 NOTE — PLAN OF CARE
Problem: Patient Care Overview  Goal: Individualization & Mutuality  Illness Management Recovery model:  Feedback.    Patient identified the following people they would like to receive feedback from if/when they see early warning signs:    Friend(s)  Two friends  Family(s): Step Mother
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
## 2018-11-01 NOTE — ED NOTES
I have performed an in person assessment of the patient. Based on this assessment the patient no longer requires a one on one attendant at this point in time.    Tyler Camacho MD  12:11 PM  June 5, 2018         Tyler Camacho MD  06/05/18 1212     Scribe Attestation (For Scribes USE Only)... Scribe Attestation (For Scribes USE Only).../Attending Attestation (For Attendings USE Only)...

## 2019-04-10 ENCOUNTER — HOSPITAL ENCOUNTER (OUTPATIENT)
Dept: PHYSICAL MEDICINE AND REHAB | Facility: CLINIC | Age: 29
Discharge: HOME | End: 2019-04-10
Attending: EMERGENCY MEDICINE

## 2019-04-10 DIAGNOSIS — G47.9 SLEEP DIFFICULTIES: ICD-10-CM

## 2019-04-10 DIAGNOSIS — M54.50 LUMBAR SPINE PAIN: ICD-10-CM

## 2019-04-10 DIAGNOSIS — M79.18 MYOFASCIAL PAIN: ICD-10-CM

## 2019-04-10 DIAGNOSIS — M54.16 LUMBAR RADICULAR PAIN: ICD-10-CM

## 2019-04-10 ASSESSMENT — MIFFLIN-ST. JEOR: SCORE: 1468.39

## 2019-04-17 ENCOUNTER — OFFICE VISIT - HEALTHEAST (OUTPATIENT)
Dept: PHYSICAL THERAPY | Facility: CLINIC | Age: 29
End: 2019-04-17

## 2019-04-17 DIAGNOSIS — M79.18 MYOFASCIAL PAIN: ICD-10-CM

## 2019-04-17 DIAGNOSIS — M54.16 LUMBAR RADICULAR PAIN: ICD-10-CM

## 2019-04-17 DIAGNOSIS — M54.50 LUMBAR SPINE PAIN: ICD-10-CM

## 2019-04-18 ENCOUNTER — HOSPITAL ENCOUNTER (OUTPATIENT)
Dept: PHYSICAL MEDICINE AND REHAB | Facility: CLINIC | Age: 29
Discharge: HOME | End: 2019-04-18
Attending: PHYSICIAN ASSISTANT

## 2019-04-18 ENCOUNTER — COMMUNICATION - HEALTHEAST (OUTPATIENT)
Dept: PHYSICAL MEDICINE AND REHAB | Facility: CLINIC | Age: 29
End: 2019-04-18

## 2019-04-18 ENCOUNTER — HOSPITAL ENCOUNTER (OUTPATIENT)
Dept: MRI IMAGING | Facility: HOSPITAL | Age: 29
Discharge: HOME | End: 2019-04-18
Attending: PHYSICIAN ASSISTANT

## 2019-04-18 DIAGNOSIS — M54.50 LUMBAR SPINE PAIN: ICD-10-CM

## 2019-04-18 DIAGNOSIS — M54.16 LUMBAR RADICULAR PAIN: ICD-10-CM

## 2019-04-18 DIAGNOSIS — M51.26 LUMBAR DISC HERNIATION: ICD-10-CM

## 2019-04-19 ENCOUNTER — COMMUNICATION - HEALTHEAST (OUTPATIENT)
Dept: PHYSICAL MEDICINE AND REHAB | Facility: CLINIC | Age: 29
End: 2019-04-19

## 2019-05-01 ENCOUNTER — HOSPITAL ENCOUNTER (OUTPATIENT)
Dept: PHYSICAL MEDICINE AND REHAB | Facility: CLINIC | Age: 29
Discharge: HOME | End: 2019-05-01
Attending: PHYSICAL MEDICINE & REHABILITATION

## 2019-05-01 DIAGNOSIS — M53.3 SACROILIAC JOINT PAIN: ICD-10-CM

## 2019-05-01 DIAGNOSIS — M51.9 ANNULAR DISC TEAR: ICD-10-CM

## 2019-05-01 DIAGNOSIS — M51.369 BULGING LUMBAR DISC: ICD-10-CM

## 2019-05-01 DIAGNOSIS — M79.18 MYOFASCIAL PAIN: ICD-10-CM

## 2019-05-01 DIAGNOSIS — M54.50 LUMBAR SPINE PAIN: ICD-10-CM

## 2019-05-01 ASSESSMENT — MIFFLIN-ST. JEOR: SCORE: 1468.39

## 2019-05-03 ENCOUNTER — OFFICE VISIT - HEALTHEAST (OUTPATIENT)
Dept: PHYSICAL THERAPY | Facility: CLINIC | Age: 29
End: 2019-05-03

## 2019-05-03 DIAGNOSIS — M79.18 MYOFASCIAL PAIN: ICD-10-CM

## 2019-05-03 DIAGNOSIS — M54.16 LUMBAR RADICULAR PAIN: ICD-10-CM

## 2019-05-03 DIAGNOSIS — M54.50 LUMBAR SPINE PAIN: ICD-10-CM

## 2019-05-10 ENCOUNTER — OFFICE VISIT - HEALTHEAST (OUTPATIENT)
Dept: PHYSICAL THERAPY | Facility: CLINIC | Age: 29
End: 2019-05-10

## 2019-05-10 ENCOUNTER — HOSPITAL ENCOUNTER (OUTPATIENT)
Dept: PHYSICAL MEDICINE AND REHAB | Facility: CLINIC | Age: 29
Discharge: HOME | End: 2019-05-10
Attending: PHYSICAL MEDICINE & REHABILITATION

## 2019-05-10 DIAGNOSIS — M51.9 ANNULAR DISC TEAR: ICD-10-CM

## 2019-05-10 DIAGNOSIS — M54.16 LUMBAR RADICULAR PAIN: ICD-10-CM

## 2019-05-10 DIAGNOSIS — M79.18 MYOFASCIAL PAIN: ICD-10-CM

## 2019-05-10 DIAGNOSIS — M54.50 LUMBAR SPINE PAIN: ICD-10-CM

## 2019-05-28 ENCOUNTER — OFFICE VISIT - HEALTHEAST (OUTPATIENT)
Dept: PHYSICAL THERAPY | Facility: CLINIC | Age: 29
End: 2019-05-28

## 2019-05-28 DIAGNOSIS — M54.50 LUMBAR SPINE PAIN: ICD-10-CM

## 2019-05-28 DIAGNOSIS — M79.18 MYOFASCIAL PAIN: ICD-10-CM

## 2019-05-28 DIAGNOSIS — M54.16 LUMBAR RADICULAR PAIN: ICD-10-CM

## 2020-01-23 ENCOUNTER — OFFICE VISIT - HEALTHEAST (OUTPATIENT)
Dept: INTERNAL MEDICINE | Facility: CLINIC | Age: 30
End: 2020-01-23

## 2020-01-23 DIAGNOSIS — Z48.02 VISIT FOR SUTURE REMOVAL: ICD-10-CM

## 2020-01-23 ASSESSMENT — MIFFLIN-ST. JEOR: SCORE: 1570.44

## 2020-02-06 ENCOUNTER — OFFICE VISIT - HEALTHEAST (OUTPATIENT)
Dept: FAMILY MEDICINE | Facility: CLINIC | Age: 30
End: 2020-02-06

## 2020-02-06 DIAGNOSIS — G43.101 MIGRAINE WITH AURA AND WITH STATUS MIGRAINOSUS, NOT INTRACTABLE: ICD-10-CM

## 2020-02-06 ASSESSMENT — MIFFLIN-ST. JEOR: SCORE: 1558.46

## 2020-03-06 ENCOUNTER — COMMUNICATION - HEALTHEAST (OUTPATIENT)
Dept: SCHEDULING | Facility: CLINIC | Age: 30
End: 2020-03-06

## 2020-05-26 ENCOUNTER — COMMUNICATION - HEALTHEAST (OUTPATIENT)
Dept: SCHEDULING | Facility: CLINIC | Age: 30
End: 2020-05-26

## 2020-08-23 ENCOUNTER — COMMUNICATION - HEALTHEAST (OUTPATIENT)
Dept: SCHEDULING | Facility: CLINIC | Age: 30
End: 2020-08-23

## 2020-08-24 ENCOUNTER — COMMUNICATION - HEALTHEAST (OUTPATIENT)
Dept: SCHEDULING | Facility: CLINIC | Age: 30
End: 2020-08-24

## 2021-05-25 ENCOUNTER — RECORDS - HEALTHEAST (OUTPATIENT)
Dept: ADMINISTRATIVE | Facility: CLINIC | Age: 31
End: 2021-05-25

## 2021-05-27 NOTE — PROGRESS NOTES
[facility]   [facility]-Pelvic/Cervico-Thoracic Initial Evaluation    Patient Name: Cassie Morrison  Date of evaluation: 4/17/2019  Referral Diagnosis:  Lumbar spine pain [M54.5]  - Primary       Lumbar radicular pain [M54.16]       Myofascial pain [M79.18]       [facility]  Referring provider: Maurilio Parsons DO  Visit Diagnosis:     ICD-10-CM    1. Lumbar spine pain M54.5    2. Lumbar radicular pain M54.16    3. Myofascial pain M79.18        Assessment:      Cassie Morrison is a 28 y.o. female with PMH significant for depression, anxiety who presents to therapy today with chief complaints of acute right sided back pain with right radicular sx since April 2019.  Sx have improved already, and she is reporting radicular pain only into the mid thigh and back of the thigh.  No pains down into her lower leg or foot, intermittent numbness.  Lumbar ROM very guarded and still having intermittent muscle spasms.  Sx do centralize some with Lizbeth prone on elbows.  SLUMP/SLR are + on the R, no crossover sign.  Pt would benefit from skilled PT to address the above limitations    POC and pathology of condition were reviewed with patient.  Pt. is in agreement with the Plan of Care  A Home Exercise Program (HEP) was initiated today.  Pt. was instructed in exercises by PT and patient was given a handout with detailed instructions.    Goals:  Pt. will demonstrate/verbalize independence in self-management of condition in : 4 weeks  Pt. will be independent with home exercise program in : 4 weeks    Pt will: demostrate full lumbar ROM without pain or radicular sx in 4 weeks  Pt will: report standing to prepare meals without using a stool or increased pain in 4 weeks  Pt will: sleep through the night without pain in 4 weeks      Patient's expectations/goals are realistic.    Barriers to Learning or Achieving Goals:  No Barriers.        Plan / Patient Instructions:        Plan of Care:   Authorization / Certification Start  Date: 19  Authorization / Certification End Date: 19  Authorization / Certification Number of Visits: 6  Communication with: Referral Source  Patient Related Instruction: Nature of Condition;Treatment plan and rationale;Self Care instruction;Basis of treatment  Times per Week: 1-2x  Number of Weeks: 3-4  Number of Visits: up to 6  Discharge Planning: to self-management, when goals are met or plateau of progress  Precautions / Restrictions : None  Therapeutic Exercise: ROM;Stretching;Strengthening  Neuromuscular Reeducation: posture;core  Manual Therapy: soft tissue mobilization;myofascial release;joint mobilization;muscle energy  Modalities: cold pack;hot pack      Plan for next visit: Thoracic PAs, review HEP, may begin lumbar ROM or gentle core exercises.     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 female who presents to therapy today with complaints of right sided low back pain and right leg pain.  She woke up with it one morning and none of her usual stretches and OTCs helped.  She went to the ER and was given a Medrol dosepak which is now complete.  This seems to have improved the leg pain.  Now the pain only comes down to her mid thigh. She has been sleeping better with gabapentin, and taking medications prescribed by Dr. Parsons.  Also using Tiger Balm.  Reports the spasms are better.  Onset 2019, GEORGIANA no known.  Alleviating factors: laying down  Functional limitations:    Sleep - wakes 1-2 a night   Standing/walking 10 minutes   Preparing meals   Bend/lift   Carrying laundry/groceries   Donning/doffing socks/shoes    Severity:   Pain Ratin   Pain rating at best: 5   Pain rating at worst: 10  Quality/Description: Worst is lowest right side, stiffness, numbness only intermittent  Activity level: Usually goes to the gym 2x a week  Pertinent Past Medical History: Depression/anxiety      Associated symptoms:                         Numbness: Intermittent                        Weakness: Preceived      Objective:      Note: Items left blank indicates the item was not performed or not indicated at the time of the evaluation.    Patient Outcome Measures :    Modified Oswestry Low Back Pain Disablity Questionnaire  in %: 64     Scores range from 0-100%, where a score of 0% represents minimal pain and maximal function. The minimal clinically important difference is a score reduction of 12%.    Examination  1. Lumbar spine pain     2. Lumbar radicular pain     3. Myofascial pain       Precautions/Restrictions: None    Posture Observation: Generally good    Lumbar ROM:    Date: 4/17/2019   *Indicate scale AROM   Lumbar Flexion 32 cm, Muse's sign   Lumbar Extension Severely limited/guarded    Right Left   Lumbar Sidebending     Lumbar Rotation Min salomon Mod salomon     Lower Extremity Strength:     Date: 4/17/2019   LE strength/5 Right Left   Hip Flexion (L1-3) 5 5   Hip Extension (L5-S1)     Hip Abduction (L4-5)     Hip Adduction (L2-3)     Hip External Rotation     Hip Internal Rotation     Knee Extension (L3-4) 5 5   Knee Flexion 5 5   Ankle Dorsiflexion (L4-5) 5 5   Great Toe Extension (L5) 5 5   Ankle Plantar flexion (S1) 5 5   Abdominals      Lumbar Sensation    Denies N/T         LE Reflexes     Patellar (L3-4)     Achilles (S1-2)     Clonus         Lumbar Special Tests:   Lumbar Special Tests Right Left   Slump + -   Straight leg raise 50 + -   Crossover response - -   ORALIA + -   FADIR - -   Hip Scour Test - -   Cameron Test - -   Bernabe's - -   Hip rotation ROM supine/prone     Ely's     Prone instability test    Pubic shotgun        CPR for Lumbar Disc Herniation    Ipsilateral SLR + <60 deg +   Contralateral SLR -   1st MTP extensor weakness -   Ankle PF weakness -     Palpation: Tenderness to right lumbar paraspinals, SI ligaments, gluteals    Repeated Motion Testing:  Centralizes with   Extension    Passive Mobility - Joint Integrity:  WNL    LE Screen:  Generally WNL         Treatment Today     TREATMENT MINUTES COMMENTS   Evaluation 20    Self-care/ Home management     Manual therapy 10 Thoracic spine PAs  Thoracic manipulation   Neuromuscular Re-education 13 Nerve health/healing discussed.  Importance of movement, blood flow, and space for nerves.  Initiated nerve glides sciatic and femoral for HEP   Therapeutic Activity     Therapeutic Exercises 10 Lizbeth prone on elbows   Gait training     Modality__________________                Total 53    Blank areas are intentional and mean the treatment did not include these items.              Marisol Edward, [place]SANA  4/17/2019  3:30 PM

## 2021-05-27 NOTE — PROGRESS NOTES
Assessment:   Cassie Morrison is a 28 y.o. y.o. female with past medical history significant for anxiety who presents today for follow-up regarding a 12-day history of low back pain now radiating into the left lower extremity with associated numbness, tingling, and weakness.  I am concerned that the patient may have a disc herniation with compression of the left S1 nerve root.  On exam, patient demonstrated weakness in the left ankle plantar flexors, and a sensory deficit in the left S1 dermatome, and an absent left Achilles reflex.       Plan:     A shared decision making plan was used.  The patient's values and choices were respected.  The following represents what was discussed and decided upon by the physician assistant and the patient.      1.  DIAGNOSTIC TESTS: I ordered an MRI lumbar spine for further evaluation given the patient's abnormal neurologic exam.    2.  PHYSICAL THERAPY: Patient is currently in physical therapy.  She will continue with physical therapy in the home exercises.    3.  MEDICATIONS:    -Percocet 5/325 mg 1 tab every 6 hours as needed, #16 with no refills was prescribed.  I checked the [place] prescription monitoring database.  Patient had 8 tabs of oxycodone prescribed on April 9, 2019.  She ran out of those.  I reviewed the risk of the medication with the patient including addiction, drowsiness, dizziness, constipation.  I told the patient we will not prescribe this medication long-term.  I will not provide telephone refills.  -Patient can continue using gabapentin.  She is using 200 mg at bedtime.  -Patient will continue using nabumetone 2 tabs twice daily.    4.  INTERVENTIONS: No interventions were ordered.  Patient could potentially benefit from interventional pain management if she fails to improve with conservative treatment, depending on the results of the MRI lumbar spine.    5.  PATIENT EDUCATION: Patient is in agreement the above plan.  All questions were answered.    6.   FOLLOW-UP: A nurse will call the patient with results of her MRI.  Follow-up will be determined by Dr. Parsons.  If she has any questions or concerns, she should not hesitate to call.    Subjective:     Cassie Morrison is a 28 y.o. female who presents today for follow-up regarding a 12-day history of severe low back pain.  Patient states that initially the pain was on the right side.  She is having intermittent pain radiating down the right leg.  Patient was evaluated in the emergency department and then by Dr. Parsons.  She completed a Medrol Dosepak which provided some relief of her pain.  She was put on gabapentin and nabumetone.  She started physical therapy yesterday.  Unfortunately, yesterday the patient was laying on her stomach and HER-2-year-old daughter jumped on her back.  Since then, she has had severe back pain, now worse on the left.  She has had to crawl.  Today she bent over in the shower and she developed severe pain radiating down the left leg with numbness, tingling, and weakness.  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    Patient complains of left greater than right low back pain.  Pain is at the lumbosacral junction.  She is experiencing spasms in the lower back.  On the left, she feels the pain radiating down the posterior lateral thigh into the posterior calf.  On the right, she feels some pain in the groin.  Patient has numbness and tingling in the left heel.  She states the left leg feels weak compared with the right.  She rates her pain today is a 10 out of 10.  At its worst it is a 10 out of 10.  At its best it is a 5 out of 10.  Pain is aggravated with bending, walking, sitting.  She denies any alleviating factors for the pain.  She denies any loss of bowel or bladder control.    Patient had her first session of physical therapy yesterday.  Patient is been using gabapentin 200 mg at bedtime.  She  uses nabumetone 2 tabs twice daily.  She completed a Medrol Dosepak.  She ran out of oxycodone.    Past medical history is reviewed and is unchanged.      Review of Systems:  Positive for numbness/tingling, weakness, headache.  Negative for loss of bowel/bladder control, inability to urinate, pain worse at night, drips, difficulty swallowing, difficulty with hand skills, fevers, unintentional weight loss.     Objective:   CONSTITUTIONAL:  Vital signs as above.  Patient appears to be in a great deal of pain.  The patient is well nourished and well groomed.    PSYCHIATRIC:  The patient is awake, alert, oriented to person, place and time.  The patient is answering questions appropriately with clear speech.  Normal affect.  HEENT: Normocephalic, atraumatic.  Sclera clear.    SKIN:  Skin over the face, posterior torso, bilateral upper and lower extremities is clean, dry, intact without rashes.  MUSCULOSKELETAL:  Gait is severely antalgic, favoring the left..  Patient demonstrates difficulty ambulate and at both the toes and heels.  She is unable to single-leg toe raise on the left x5 (only able to do it twice).  Able to single leg toe raise on the right x5 without difficulty.  Significant tenderness over the left lower lumbar paraspinal muscles.      The patient has 4/5 strength left ankle plantar flexors, otherwise 5/5 strength for the bilateral hip flexors, knee flexors/extensors, ankle dorsiflexors, right ankle plantar flexors.  NEUROLOGICAL: Reflexes are 2+ bilateral patellar, 2+ right Achilles, absent left Achilles.  No ankle clonus bilaterally.  Subjective diminished sensation left S1 dermatome.     RESULTS:  I reviewed a CT abd/pelvis from 4/20/18 which shows a disc bulge at L5/S1.

## 2021-05-27 NOTE — PATIENT INSTRUCTIONS - HE
1. Start Physical therapy    2. Try gabapentin at bedtime for nerve pain and sleep    3. Continue medrol pack    4. Take tylenol as needed for pain until medrol completed, then can take/try nabumetone as needed    5. Call with questions

## 2021-05-27 NOTE — PROGRESS NOTES
Assessment/Plan:      Diagnoses and all orders for this visit:    Lumbar spine pain  -     Ambulatory referral to Physical Therapy  -     nabumetone (RELAFEN) 500 MG tablet; Take 1-2 tablets (500-1,000 mg total) by mouth 2 (two) times a day as needed for pain.  Dispense: 60 tablet; Refill: 0    Lumbar radicular pain  -     gabapentin (NEURONTIN) 100 MG capsule; 1 cap at bedtime x 3 days.  Then may take 2 caps at bedtime x 3 days, then may take 3 caps at bedtime  Dispense: 60 capsule; Refill: 2  -     Ambulatory referral to Physical Therapy  -     nabumetone (RELAFEN) 500 MG tablet; Take 1-2 tablets (500-1,000 mg total) by mouth 2 (two) times a day as needed for pain.  Dispense: 60 tablet; Refill: 0    Myofascial pain  -     Ambulatory referral to Physical Therapy    Sleep difficulties  -     gabapentin (NEURONTIN) 100 MG capsule; 1 cap at bedtime x 3 days.  Then may take 2 caps at bedtime x 3 days, then may take 3 caps at bedtime  Dispense: 60 capsule; Refill: 2        Assessment: Pleasant 28 y.o. female with a history of depression anxiety with:    1.  1 week history of low back pain with proximal right lower extremity radicular symptoms in an S1 distribution..  she does have some mild disc bulging and old CT scan of her abdomen and pelvis from 1 year ago at L5-S1 question new disc herniation.  She does only have pain for 1 week however and has normal reflexes sensation and strength in lower extremities.    2.  Myofascial pain lumbar spine.    3.  Poor sleep related to pain.    Discussion:    1. I discussed the diagnosis and treatment options.  We discussed options of medications, imaging, therapy.  She was to avoid medications to cause significant drowsiness as she has children at home but is having trouble sleeping would like something for some of the pain.  Was also recently given Medrol Dosepak from the emergency department.    2.  Continue Medrol Dosepak for now.    3.  Start physical therapy for some core  "strengthening and nerve glides.    4.  Trial gabapentin low-dose 100 mg at bedtime increasing to 300 at bedtime.    5.  We will provide a prescription for nabumetone which may be started after prednisone completed.    6.  Continue with acetaminophen as needed for pain in the interim until Medrol Dosepak completed.    7.  Follow-up 3-4 weeks.  If symptoms worsen, she develops bowel or bladder incontinence or she has weakness in the legs she should contact us and I will order an MRI of the lumbar spine at that time and follow-up after.      It was our pleasure caring for your patient today, if there any questions or concerns please do not hesitate to contact us.      Subjective:   Patient ID: Cassie Morrison is a 28 y.o. female.    History of Present Illness:Patient presents at the request of the emergency department for evaluation of low back pain and right leg pain and paresthesias.  Patient began having pain about 1 week ago.  No trauma.  Started with pain at the lumbosacral junction and over the few days significantly worsened and she began having spasms in the low back.  Initially it felt that ibuprofen or acetaminophen ice heat and topical treatments should be tried and they were not effective.  The pain continued to worsen and after a couple of days she developed some pain on the right leg with shooting pains down the back of the leg to the knee during \"spasms \"in the low back and numbness and tingling down the back of the leg to the knee otherwise.  She did present to the emergency department was given Medrol Dosepak and taking ibuprofen as well but still having pain.    Pain is now constant in the low back to the lumbosacral junction.  She is still having intermittent spasms on the right gluteal region and low back with some shooting pain down the right leg and numbness and tingling down the back of the leg to the knee.  Worse with sitting for more than 15 minutes or standing more than 10 minutes and " "walking for long distance.  She needs to change positions regularly to decrease pain in the best position is half reclined or sitting back in a chair.  At night she has poor sleep related symptoms and does have tingling down the leg.  She has not had any bowel or bladder incontinence.  She has some urinary urgency at times and also a sense that she needs to have a bowel movement when she uses the bathroom she does not have a bowel movement.  No weakness in the legs she felt as if her legs were going to give out at times but they have not yet done so.  She also has some oxycodone from the emergency department she takes occasionally.      Imaging: *CT abdomen and pelvis from 1 year ago personally reviewed.  This shows some mild degenerative changes L5-S1 with what appears to be broad-based disc bulge L5-S1.  No fractures at that time.  No recent imaging.    Review of Systems: She complains of headaches, change in vision, leg swelling, shortness of breath, abdominal pain, constipation, nausea, urinary hesitancy at times but no bowel or bladder incontinence.  She has muscle pain and fatigue with some itching, no rashes.  Has some dizziness bruising easily poor sleep depression and anxiety. Remainder of 12 point review systems negative unless listed above.    Past Medical History:   Diagnosis Date     Abnormal Pap smear of cervix 2012    Repeat WNL     Allergic     Morphine     Anemia      Anxiety      Depression     seasonal - been dx since 14     Endometriosis     dx at 14, 3 laproscopic procedures     Endometriosis      Kidney stone     1 instance - prior to 2008 ~15 years     Malignant hyperthermia due to anesthesia      Migraine     migraines - back of neck     PID (pelvic inflammatory disease)     in an ER visit, \"got a shot\" - feels like it was misdiagnosed, endometriosis mis-diagnosed?     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  Boyfriend works at [employer].  Is attempting to quit smoking and does not drink alcohol.      The following portions of the patient's history were reviewed and updated as appropriate: allergies, current medications, past family history, past medical history, past social history, past surgical history and problem list.      WHO 5: 15    GERMAIN Score: 56      Objective:   Physical Exam:    Vitals:    04/10/19 1354   BP: 111/73   Pulse: (!) 53       General:  Well-appearing female in no acute distress.  Pleasant, cooperative, and interactive throughout the examination and interview.  CV: No lower extremity edema on inspection or paltation.  Lymphatics: No cervical lymphadenopathy palpated. Eyes: sclera clear. Skin: No rashes or lesions seen over the head/neck, hairline, arms, legs,  .  Respirations unlabored.  MSK: Gait is nonantalgic.  Able to heel-toe walk without difficulty.  Negative Romberg.  Spine: normal AP curves of the C, T, and L spine.  Significantly reduced range of motion lumbar spine flexion and extension secondary to pain..  Palpation: Tenderness to palpation over lumbosacral junction L5-S1 right PSIS right gluteal tissues.  No tenderness over the sciatic notch.  Extremities: Full range of motion of the elbows, and wrists with no effusions or tenderness to palpation.   Full range of motion of the hips, knees, and ankles with no effusions or tenderness to palpation.   No hypermobility of the upper or lower extremities.  Neurologic exam: Mental status: Patient is alert and oriented with normal affect.  Attention, knowledge, memory, and language are intact.  Normal coordination throughout the examination.  Reflexes are 2+ and symmetric biceps, triceps, brachioradialis, patellar, and Achilles with down-going toes and Negative Chidi's.  Sensation is intact to light touch throughout the upper and lower extremities bilaterally.   Manual muscle testing reveals 5 out of 5 in the hip flexors, knee flexors/extensors, ankle plantar flexors, ankle  dorsiflexors, and EHL.  Upper extremities: Grossly normal strength . Normal muscle bulk and tone in the arms and legs.   Mild positive seated straight leg raise right negative left.

## 2021-05-27 NOTE — TELEPHONE ENCOUNTER
Patient calling to find out if she could be seen in clinic today or if she should go to the ER. States she was seen as new patient last week per Maurilio Parsons DO. She started PT yesterday. Her 2 year old daughter jumped on her back and she is now experiencing pain across entire low back; it had been just right sided. She also reports she is having some pain down into the left leg.     Discussed with Malaika SHULTZ. She will see patient today. Patient scheduled for 1020 AM.

## 2021-05-28 NOTE — PATIENT INSTRUCTIONS - HE
Follow-up visit with Dr. Parsons in 2-4 weeks to discuss injection outcome and determine care plan going forward.       DISCHARGE INSTRUCTIONS    During office hours (8:00 a.m.- 4:30 p.m.) questions or concerns may be answered  by calling Spine Navigation Nurses at  [phone].     If you experience any problems after hours  please call [phone] and you will be connected to [facility].     All Patients:    ? You may experience an increase in your symptoms for the first 2 days (It may take anywhere between 2 days- 2 weeks for the steroid to have maximum effect).    ? You may use ice on the injection site, as frequently as 20 minutes each hour if needed.    ? You may take your pain medicine.    ? You may continue taking your regular medication after your injection. If you have had a Medial Branch Block you may resume pain medication once your pain diary is completed.    ? You may shower. No swimming, tub bath or hot tub for 48 hours.  You may remove your bandaid/bandage as soon as you are home.    ? You may resume light activities, as tolerated.    ? Resume your usual diet as tolerated.    ? It is strongly advised that you do not drive for 1-3 hours post injection.    ? If you have had oral sedation:  Do not drive for 8 hours post injection.      ? If you have had IV sedation:  Do not drive for 24 hours post injection.  Do not operate hazardous machinery or make important personal/business decisions for 24 hours.      POSSIBLE STEROID SIDE EFFECTS (If steroid/cortisone was used for your procedure)    -If you experience these symptoms, it should only last for a short period      Swelling of the legs                Skin redness (flushing)       Mouth (oral) irritation     Blood sugar (glucose) levels              Sweats                      Mood changes    Headache    Sleeplessness         POSSIBLE PROCEDURE SIDE EFFECTS  -Call the Spine Center if you are concerned    Increased Pain              Increased numbness/tingling        Nausea/Vomiting            Bruising/bleeding at site        Redness or swelling                                                Difficulty walking        Weakness             Fever greater than 100.5    *In the event of a severe headache after an epidural steroid injection that is relieved by lying down, please call the [facility] to speak with a clinical staff member*

## 2021-05-28 NOTE — TELEPHONE ENCOUNTER
Call to pt with provider's results and recommendations. Pt verbalized understanding. She will continue with physical therapy and the medications as prescribed by Malaika Pepe. Pt already has follow-up appt with Dr. Parsons scheduled on 5/1. Encouraged pt to call in the meantime with any questions/concerns or worsening symptoms.

## 2021-05-28 NOTE — TELEPHONE ENCOUNTER
----- Message from Maurilio Parsons DO sent at 4/19/2019  3:13 PM CDT -----  I reviewed the MRI as ordered by Malaika Blackburn.  She has some small disc bulges in the low back which is normal wear and tear.  She does have a tear in the outer layer of the disc most significant at L4-5 which may be causing her new pain but these typically heal.  Recommend she continue with physical therapy and see the medications help that Malaika prescribed.  We can see her back next 1-2 weeks to see show she is doing.

## 2021-05-28 NOTE — PROGRESS NOTES
[facility] Daily Progress Note    Patient Name: Cassie Morrison  Date of evaluation: 4/17/2019  Today's Date: 5/3/2019   Referral Diagnosis:  Lumbar spine pain [M54.5]  - Primary       Lumbar radicular pain [M54.16]       Myofascial pain [M79.18]       [facility]  Referring provider: Dr. Parsons  Visit Diagnosis:     ICD-10-CM    1. Lumbar spine pain M54.5    2. Lumbar radicular pain M54.16    3. Myofascial pain M79.18        Assessment:       Patient does continue to have left sided LBP, left anterior thigh numbness. She did responded positively to HEP addition today. Patient continues to benefit from further PT.   Patient getting injections next week.    Goals:  Pt. will demonstrate/verbalize independence in self-management of condition in : 4 weeks  Pt. will be independent with home exercise program in : 4 weeks    Pt will: demostrate full lumbar ROM without pain or radicular sx in 4 weeks  Pt will: report standing to prepare meals without using a stool or increased pain in 4 weeks  Pt will: sleep through the night without pain in 4 weeks          Plan / Patient Instructions:       Plan for next visit: Thoracic PAs (if indicated) LE axial distraction if indicated, try some core strength if ready. See how new ex are going.    Getting injection in 1 week     Subjective:        Had MRI and it revealed disc bulge/annular tears, see image.  Going to get an injection in 1 week  Doing the ex at home so far.   The difficult one is the prone quad stretch  Was pretty sore the day after evaluation.    Social information:   Occupation: [employer], 6 year old and 2.5 year old twin boys  Functional limitations:    Sleep - wakes 1-2 a night   Standing/walking 10 minutes   Preparing meals   Bend/lift   Carrying laundry/groceries   Donning/doffing socks/shoes      Objective:       Patient can feel her left heel again.   Left thigh is numb still in a band.   No symptoms in right leg, but today after leg pull had a slight  numbness in right leg, diminished with child's pose    Exercises:  Exercise #1: prone press ups  Comment #1: supine SLR sliders  Exercise #2: prone quad stretch with  - nerve glide  Comment #2: supine LTR - added to HEP  Exercise #3: cat/cow - Added to HEP  Comment #3: child's pose 3 way - Added to HEP           Treatment Today     TREATMENT MINUTES COMMENTS   Evaluation     Self-care/ Home management     Manual therapy 10 Supine long axis distraction bilat   Neuromuscular Re-education     Therapeutic Activity     Therapeutic Exercises 20 See above. Continued ed on healing process. Discussed mechanics with lifting 2.5 year old boys.  Discussed that symptoms may come and go over the healing process and sometimes soreness can happen after PT.   Gait training     Modality__________________                Total 30    Blank areas are intentional and mean the treatment did not include these items.              Beverly Rainey, DPT  5/3/2019

## 2021-05-28 NOTE — PROGRESS NOTES
[facility] Daily Progress Note    Patient Name: Cassie Morrison  Date of evaluation: 4/17/2019  Today's Date: 5/10/2019   Visit Number: 3/6  (per order)  Referral Diagnosis:  Lumbar spine pain [M54.5]  - Primary       Lumbar radicular pain [M54.16]       Myofascial pain [M79.18]       [facility]  Referring provider: Dr. Parsons  Visit Diagnosis:     ICD-10-CM    1. Lumbar spine pain M54.5    2. Lumbar radicular pain M54.16    3. Myofascial pain M79.18        Assessment:       Patient does continue to have left sided LBP, left anterior thigh numbness, but feels like she is better able to stand upright. She did responded positively to HEP addition today. Patient continues to benefit from further PT.   Patient getting injection today.     Goals:  Pt. will demonstrate/verbalize independence in self-management of condition in : 4 weeks  Pt. will be independent with home exercise program in : 4 weeks    Pt will: demostrate full lumbar ROM without pain or radicular sx in 4 weeks  Pt will: report standing to prepare meals without using a stool or increased pain in 4 weeks  Pt will: sleep through the night without pain in 4 weeks          Plan / Patient Instructions:       Plan for next visit: LE axial distraction if indicated, try some core strength if ready.  (Patient getting injection following today's appt)     Subjective:        Pain: 6/10  Still having spasms.   Is noticing mild improvement since starting Cat/cow.   Pain with child's pose reaching to side.   Prone quad stretch increases back pain.  Intermittent pain and mildly less severity.   Numbness: left heel is good, no longer numb, left anterior thigh is still slightly numb. Right leg is good.     Functional limitations:    Sleep - wakes 1-2 a night   Standing/walking 10 minutes   Preparing meals   Bend/lift   Carrying laundry/groceries   Donning/doffing socks/shoes      Objective:       Still no longer having numbness in left heel.   Left thigh is numb  still, but area of numbness is smaller (describes like a ball vs. A large band)  No symptoms in right leg.   Patient able to stand upright better.    Exercises: see flowsheet for date performed  Exercise #1: prone press ups  Comment #1: supine SLR sliders  Exercise #2: prone quad stretch - changed to supine quad stretching/nerve glide with leg off side of mat instead.  Comment #2: supine LTR - added to HEP  Exercise #3: cat/cow - Added to HEP  Comment #3: child's pose 3 way - Added to HEP           Treatment Today     TREATMENT MINUTES COMMENTS   Evaluation     Self-care/ Home management     Manual therapy 15 Supine long axis distraction bilat, manual nerve gliding left LE  Left quad MFR.    Neuromuscular Re-education     Therapeutic Activity     Therapeutic Exercises 10 Reviewed all HEP ex, changed prone quad stretch/nerve glide to supine.   Gait training     Modality__________________                Total 25    Blank areas are intentional and mean the treatment did not include these items.              Beverly Rainey DPT  5/10/2019

## 2021-05-28 NOTE — PROGRESS NOTES
Assessment/Plan:      Diagnoses and all orders for this visit:    Lumbar spine pain  -     OPS TFESI Lumbar Bilateral; Future; Expected date: 05/01/2019    Annular disc tear  -     OPS TFESI Lumbar Bilateral; Future; Expected date: 05/01/2019    Bulging lumbar disc    Myofascial pain    Sacroiliac joint pain        Assessment: Pleasant 28 y.o. female with a history of depression anxiety with:    1.  Persistent severe low back pain for a month with increase in between.  This is now lumbar spine with left greater than right lower extremity paresthesias.  She has disc bulging at L4-503-4 and L5-S1.  At L4-5 there is a large annular tear that I suspect is causing her increased low back pain.    2.  Myofascial pain lumbar spine    3.  Left sacroiliac joint pain.  May be compensatory I believe most of the pain is related to lumbar disc annular tear.    4.  Poor sleep related to pain improved with gabapentin    Discussion:    1.  We discussed her progress with physical therapy along with medication options interventions and other treatments.  I do not believe she is surgical at this point and she wants to avoid surgery.    2.  I highly recommend that she make further appointments with physical therapy.  Has been to one visit and doing home exercises however PT because increase in pain afterwards and when she got home and I do not believe she would tolerate much in the way of therapy at this point with regards to aggressive exercises nor does she.  I would like her to at least schedule a few more visits.    3.  Recommend bilateral L4-5 transforaminal epidural steroid injections to treat the annular tear related pain.  Dr. Guajardo    4.  Continue nabumetone as needed.    5.  Continue gabapentin as this is helpful with sleep.    6.  Follow-up with me or Malaika Pepe 2 to 4 weeks after injection      It was our pleasure caring for your patient today, if there any questions or concerns please do not hesitate to contact  us.      Subjective:   Patient ID: Cassie Morrison is a 28 y.o. female.    History of Present Illness: Patient presents for follow-up of low back pain.  This is increased and changed some since her last visit with me but not since with Malaika Pepe.  I last saw her 1 week after pain started physical therapy completed her Medrol Dosepak started gabapentin and nabumetone.  Nabumetone has been helpful somewhat intermittently but trying to avoid this as it can be bothersome on her stomach after a while.  Gabapentin is helpful with sleep.    After seeing me she then had her daughter jumped on her back and had severe increase in pain was seen by Malaika Pepe who provided a few oxycodone which caused some issues with patient's mood she does not tolerate these well.  MRI was ordered at that time we did review that today.  Her pain persists across lumbosacral junction but now is changed since see me initially with left leg paresthesias posterior anterior left thigh wrapping around the thigh as well as the left calcaneus.  Constant numbness.  Pain is a 10/10 at worst 8/10 today 3/10 at best.  Worse with any movements or standing for too long.  Physical therapy exercises also increased pain.  She went to one visit of therapy which flared her pain does home exercises but also makes it harder for her to take care of her daughter given increased pain.  Seems to be better with changing positions or lying down or sitting.  Imaging:    MRI report and images were personally reviewed and discussed with the patient.  A plastic model was utilized during the discussion.  MRI of the lumbar spine personally reviewed.  This shows degenerative disc disease L3-4 L4-5 L5-S1.  Small annular tear at L3-4.  Larger annular tear at L4-5.  On my review this appears more acute with a significant high intensity zone.  Central bulge.  L5-S1 shallow bulge mild narrowing of the right lateral recess.  Mild to moderate right foraminal stenosis.    Review of  "Systems: Has numbness tingling in the left leg.  Feels weak in the back and leg.  Has headaches worst pain at night.  No bowel or bladder incontinence.  No balance changes swallowing difficulties coordination problems fevers or weight loss.    Past Medical History:   Diagnosis Date     Abnormal Pap smear of cervix 2012    Repeat WNL     Allergic     Morphine     Anemia      Anxiety      Depression     seasonal - been dx since 14     Endometriosis     dx at 14, 3 laproscopic procedures     Endometriosis      Kidney stone     1 instance - prior to 2008 ~15 years     Malignant hyperthermia due to anesthesia      Migraine     migraines - back of neck     PID (pelvic inflammatory disease)     in an ER visit, \"got a shot\" - feels like it was misdiagnosed, endometriosis mis-diagnosed?     Postpartum depression 2012    Was doing the postpartum period alone     Urinary tract infection      Varicella     twice as a child       The following portions of the patient's history were reviewed and updated as appropriate: allergies, current medications, past family history, past medical history, past social history, past surgical history and problem list.      Objective:   Physical Exam:    Vitals:    05/01/19 0909   BP: 112/63   Pulse: 66       General: Alert and oriented with normal affect. Attention, knowledge, memory, and language are intact. No acute distress.   Eyes: Sclerae are clear.  Respirations: Unlabored. CV: No lower extremity edema.   Gait:  Nonantalgic, cautious  Tenderness to palpation lumbar paraspinals gluteal tissues left SI joint.    Sensation is intact to light touch throughout the  lower extremities.  Reflexes are   2+ patellar and Achilles with downgoing toes.  Low back pain with Bassem's on the right with negative thigh thrust on the right.  Significant positive thigh thrust on the left.  Positive Bassem's test on the left for SI pain.  Although she has positive straight leg raise on the left does not " improve with bending/flexing the knee.  Not likely radicular.      Manual muscle testing reveals: Some giveaway to pain left lower extremity  Right /Left out of 5     5/5 hip flexors  5/5 knee flexors  5/5 knee extensors  5/5 ankle plantar flexors  5/5 ankle dorsiflexors  5/5  EHL

## 2021-05-29 ENCOUNTER — RECORDS - HEALTHEAST (OUTPATIENT)
Dept: ADMINISTRATIVE | Facility: CLINIC | Age: 31
End: 2021-05-29

## 2021-05-29 NOTE — PROGRESS NOTES
"[facility] Daily Progress Note    Patient Name: Cassie Morrison  Date of evaluation: 4/17/2019  Today's Date: 5/28/2019   Visit Number: 4/6  (per order)  Referral Diagnosis:  Lumbar spine pain [M54.5]  - Primary       Lumbar radicular pain [M54.16]       Myofascial pain [M79.18]       [facility]  Referring provider: Dr. Parsons  Visit Diagnosis:     ICD-10-CM    1. Lumbar spine pain M54.5    2. Lumbar radicular pain M54.16    3. Myofascial pain M79.18        Assessment:       Feeling much better. Still some pain with standing too long (15 minutes). Much better ROM and daily function.   1 time per day will have a \"spasm\". But happy with how much shorter, less intense and how infrequent they are.   Left thigh is no longer numb. Right knee has occasional numbness at times.     At this time, patient is going to follow up with referring provider. She no longer hast PT scheduled. We discussed ways to work on core strengthening and to keep up with home stretches/nerve glides for the next few months. 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 She is also going to get back to the gym, we talked about how to do this gradually as well. Patient comfortable with progression at this time. PT will keep her chart open the next 1-2 months. She can schedule another follow up if she feels she needs to, otherwise will DC Chart after 2 months. Patient good with this plan.      Goals:  Pt. will demonstrate/verbalize independence in self-management of condition: MET  Pt. will be independent with home exercise program: MET  Pt will: demostrate full lumbar ROM without pain or radicular sx: MET  Pt will: report standing to prepare meals without using a stool or increased pain: BETTER, but has to take a few breaks.  Pt will: sleep through the night without pain: IMPROVED, but still an issue with laying too long, gabapentin helps.      Plan / Patient Instructions:       Keep chart " open x 1-2 months.    Subjective:        Pain: 1-2/10  Still having spasms, mild, sleeping a bit better. Working on exercises still.    Functional limitations:    Sleep - wakes 1-2 a night   Standing/walking 10 minutes   Preparing meals   Bend/lift   Carrying laundry/groceries   Donning/doffing socks/shoes      Objective:       Prior to injury, patient had gone to the gym: running, swimming, weights.    Patient is up to 30-45 minutes of walking.     Discussed getting back to the gym lightly.     Standing upright is non painful.     Exercises: see flowsheet for date performed  Exercise #1: prone press ups  Comment #1: supine SLR sliders  Exercise #2: prone quad stretch - changed to supine quad stretching/nerve glide with leg off side of mat instead.  Comment #2: supine LTR  Exercise #3: cat/cow  Comment #3: child's pose 3 way   Exercise #4: supine bridges  Comment #4: TA set with leg raise  Exercise #5: sidelying hip abduction  Comment #5: prone supermans  Exercise #6: sit<>Stand repeats           Treatment Today     TREATMENT MINUTES COMMENTS   Evaluation     Self-care/ Home management     Manual therapy     Neuromuscular Re-education     Therapeutic Activity     Therapeutic Exercises 25 See above   Gait training     Modality__________________                Total 25    Blank areas are intentional and mean the treatment did not include these items.              Beverly Rainey DPT  5/28/2019

## 2021-05-30 VITALS — WEIGHT: 142 LBS | HEIGHT: 66 IN | BODY MASS INDEX: 22.82 KG/M2

## 2021-05-31 VITALS — BODY MASS INDEX: 23.87 KG/M2 | WEIGHT: 152.1 LBS | HEIGHT: 67 IN

## 2021-06-02 VITALS — BODY MASS INDEX: 25.06 KG/M2 | WEIGHT: 160 LBS

## 2021-06-02 VITALS — BODY MASS INDEX: 25.11 KG/M2 | WEIGHT: 160 LBS | HEIGHT: 67 IN

## 2021-06-03 VITALS — HEIGHT: 67 IN | WEIGHT: 160 LBS | BODY MASS INDEX: 25.11 KG/M2

## 2021-06-03 VITALS — BODY MASS INDEX: 25.15 KG/M2 | WEIGHT: 160.6 LBS

## 2021-06-04 VITALS
HEART RATE: 66 BPM | SYSTOLIC BLOOD PRESSURE: 128 MMHG | WEIGHT: 179 LBS | DIASTOLIC BLOOD PRESSURE: 72 MMHG | BODY MASS INDEX: 27.13 KG/M2 | OXYGEN SATURATION: 96 % | HEIGHT: 68 IN

## 2021-06-04 VITALS
HEIGHT: 67 IN | SYSTOLIC BLOOD PRESSURE: 120 MMHG | WEIGHT: 180 LBS | BODY MASS INDEX: 28.25 KG/M2 | DIASTOLIC BLOOD PRESSURE: 70 MMHG

## 2021-06-05 NOTE — PROGRESS NOTES
Internal Medicine Office Visit  [facility]  Patient Name: Cassie Morrison  Patient Age: 29 y.o.  YOB: 1990  MRN: [id number]    Date of Visit: 2020  Reason for Office Visit:   Chief Complaint   Patient presents with     Lacerations     right index finger, sutures to be removed            Assessment / Plan / Medical Decision Makin. Visit for suture removal  Patient will keep the area clean and dry discouraged from soaking the area.  Recommend continue monitoring for loss of sensation if persistent would recommend referral to hand specialty.  She is requesting to establish care with a primary will be scheduled with Adela Garner PA-C at patient's request.      No orders of the defined types were placed in this encounter.  Followup: Return in about 2 weeks (around 2020). earlier if needed.    Health Maintenance Review  Health Maintenance   Topic Date Due     MEDICARE ANNUAL WELLNESS VISIT  2006     TD 18+ HE  2006     ADVANCE CARE PLANNING  2006     PAP SMEAR  2009     INFLUENZA VACCINE RULE BASED (1) 2019     HIV SCREENING  Completed     TDAP ADULT ONE TIME DOSE  Completed         I am having Cassie Morrison maintain her ferrous sulfate, gabapentin, nabumetone, methylPREDNISolone, metoclopramide, SUMAtriptan, and ibuprofen.      HPI:  Cassie Morrison is a 29 y.o. year old who presents to the office today with chronic conditions including History of hypotension, generalized anxiety disorder, history of postpartum depression, history of laparoscopy, history of anemia.  Patient was seen in the ED on 2020 after sustaining a laceration to the right index finger.  Patient reported that she received a laceration after a glass jar fell onto her hand as her refrigerator shelf collapsed.  She was cleaning the refrigerator at the time with a shelf collapse she rinse the laceration with cold water.  In the ED the laceration  "was cleansed and sutured with #3 Ethilon sutures.  Patient reports that she has continued to have this bandaged and is in need of suture removal today.  She does report some loss of sensation in the tip of her finger.      Current Scheduled Meds:  Outpatient Encounter Medications as of 1/23/2020   Medication Sig Dispense Refill     ferrous sulfate 325 (65 FE) MG tablet Take 1 tablet by mouth daily with breakfast.       ibuprofen (ADVIL,MOTRIN) 600 MG tablet Take 1 tablet (600 mg total) by mouth every 6 (six) hours as needed for pain. 30 tablet 0     SUMAtriptan (IMITREX) 50 MG tablet Take 1 tablet (50 mg total) by mouth once as needed for migraine. May repeat after 2 hours X 1 if incomplete relief. 10 tablet 0     gabapentin (NEURONTIN) 100 MG capsule 1 cap at bedtime x 3 days.  Then may take 2 caps at bedtime x 3 days, then may take 3 caps at bedtime 60 capsule 2     methylPREDNISolone (MEDROL, RICHARD,) 4 mg tablet follow package directions 21 tablet 0     metoclopramide (REGLAN) 10 MG tablet Take 1 tablet (10 mg total) by mouth every 6 (six) hours as needed for nausea. 30 tablet 0     nabumetone (RELAFEN) 500 MG tablet Take 1-2 tablets (500-1,000 mg total) by mouth 2 (two) times a day as needed for pain. 60 tablet 0     No facility-administered encounter medications on file as of 1/23/2020.      Past Medical History:   Diagnosis Date     Abnormal Pap smear of cervix 2012    Repeat WNL     Allergic     Morphine     Anemia      Anxiety      Depression     seasonal - been dx since 14     Endometriosis     dx at 14, 3 laproscopic procedures     Endometriosis      Kidney stone     1 instance - prior to 2008 ~15 years     Malignant hyperthermia due to anesthesia      Migraine     migraines - back of neck     PID (pelvic inflammatory disease)     in an ER visit, \"got a shot\" - feels like it was misdiagnosed, endometriosis mis-diagnosed?     Postpartum depression 2012    Was doing the postpartum period alone     Urinary " tract infection      Varicella     twice as a child     Past Surgical History:   Procedure Laterality Date      SECTION N/A 2016    Procedure:  SECTION;  Surgeon: Reno Alamo MD;  Location: [facility] OR;  Service:      COLPOSCOPY  2012     ENDOMETRIAL ABLATION      during one of the laparotomies     EXPLORATORY LAPAROTOMY      3x - last in 2015, 2010, Gyne surgeon in 2015 states no contraindication to vaginal birth per patient     IUD removal  2015    laproscopic procedure     LAPAROSCOPIC HYSTERECTOMY N/A 2018    Procedure: ROBOTC TOTAL LAPAROSCOPIC HYSTERECTOMY, BILATERAL SALPINGECTOMY ;  Surgeon: Reno Alamo MD;  Location: [facility] OR;  Service:      IL LAP,FULGURATE/EXCISE LESIONS Right 2017    Procedure: LAPAROSCOPY, OVARIAN CYSTECTOMY;  Surgeon: Reno Alamo MD;  Location: [facility] OR;  Service: Gynecology     Social History     Tobacco Use     Smoking status: Former Smoker     Last attempt to quit: 2019     Years since quittin.7     Smokeless tobacco: Never Used   Substance Use Topics     Alcohol use: No     Drug use: No     Family History   Problem Relation Age of Onset     Arthritis Mother      Depression Mother      Drug abuse Mother      Mental illness Mother      Stroke Father      Early death Father      Mental illness Sister         ADHD and anger issues     Alcohol abuse Maternal Aunt      Drug abuse Maternal Aunt      Hypertension Maternal Aunt      Kidney disease Maternal Aunt      Early death Paternal Uncle      Stroke Paternal Uncle      Heart disease Maternal Grandmother      Hearing loss Maternal Grandmother      Arthritis Maternal Grandmother      Dementia Maternal Grandfather      Hypertension Maternal Grandfather      Diabetes Paternal Grandmother      Hypertension Paternal Grandmother      Arthritis Paternal Grandfather      Asthma Paternal Grandfather      Cancer Paternal Grandfather      Depression Paternal  "Grandfather      Hearing loss Paternal Grandfather      Stroke Paternal Grandfather      Social History     Social History Narrative     Not on file       Objective / Physical Examination:  Vitals:    01/23/20 0928   BP: 128/72   Patient Site: Left Arm   Patient Position: Sitting   Cuff Size: Adult Regular   Pulse: 66   SpO2: 96%   Weight: 179 lb (81.2 kg)   Height: 5' 8\" (1.727 m)     Wt Readings from Last 3 Encounters:   01/23/20 179 lb (81.2 kg)   01/13/20 172 lb (78 kg)   10/19/19 173 lb 12.8 oz (78.8 kg)     Body mass index is 27.22 kg/m .     General Appearance: Alert and oriented, cooperative, affect appropriate, speech clear, in no apparent distress  Head: Normocephalic, atraumatic  Eyes: Conjunctivae clear and sclerae non-icteric  Throat: Lips and mucosa moist.   Lungs:Normal inspiratory and expiratory effort  Right index finger: Capillary refill less than 2 seconds she has a well-healed laceration.  Noted on the palmar aspect of her index finger right hand utilization of a scissors and a pickups sutures removed without difficulty the area is bandaged.  Neuro: Alert and oriented, follows commands appropriately.         Maryjane Barnett, CNP  "

## 2021-06-06 NOTE — PROGRESS NOTES
HPI:  Cassie Morrison is a 29 y.o. female who is seen for   Chief Complaint   Patient presents with     Establish Care   Cassie Morrison is seen to establish care, has a history of migraines and needs a refill of her Imitrex.  States that she gets about 3-4 migraines a month.  She does have aura, pain is behind the right eye.  Imitrex works well at 1-2 doses.  She usually takes ibuprofen if the pain is not too severe.  She has chronic low back pain, takes over-the-counter medication for this, does not have a lower leg neuropathies or weakness.  She has a history of endometriosis, had a hysterectomy in 2017.  She still has her ovaries.  She notes some night sweats and weight gain.  She had a colonoscopy 9 years ago for endometrial seeding of the abdomen.  She had a laparoscopic surgery to remove this evening.  She has a history of anemia but does not have any current concerns with this.  She tends to have some chronic abdominal discomfort.  Her last Pap smear was in 2016.  She sees her OB/GYN for ongoing surveillance of the endometriosis.    She has a history of generalized anxiety disorder, has used counseling to keep her symptoms under control, currently uses these techniques from [facility].  Did try Prozac years ago but did not like how it made her feel.    ROS: Patient denies fever, chills, sweats, fainting, fatigue, weight change, dizziness, sleep problems, chest pain, palpitations, shortness of breath, wheezing, cough,  sore throat, changes in hearing, ear pain,tinnitus,  disphagia, sore throat, globus, changes in vision, eye pain eye redness, acid reflux, nausea, vomiting, diarrhea, constipation, black or bloody stools,  Dysuria, frequency, urinary incontinence, nocturia, hematuria, joint pain, bone pain, muscle cramps,edema, weakness, numbness, tingling of extremities, rash, itching, skin changes, swollen lymph nodes, thirst, increased urination, breast lumps, breast pain, nipple discharge, memory difficulties,  anxiety, mood swings, (female)vaginal discharge, dyspareunia, menorrhagia, pelvic pain, sexual dysfunction,   (male) testicular lumps, erectile dysfunction.    No results found for: HGBA1C  Lab Results   Component Value Date    CREATININE 0.77 10/19/2019     Patient Active Problem List   Diagnosis     Depression with anxiety     Hx of postpartum depression, currently pregnant     History of laparoscopy     Encounter for supervision of other normal pregnancy, second trimester     Abnormal antibody titer     Hypotension     Anxiety     Vaginal bleeding during pregnancy, antepartum     Pelvic pain     Vaginal bleeding     Pregnant     Postoperative fever     Alcoholic intoxication without complication (H)     Cannabis use disorder, moderate, dependence (H)     Chronic midline low back pain without sciatica     Surveillance for Depo-Provera contraception     Suicidal ideation     Warm reactive antibody (H)     Family History   Problem Relation Age of Onset     Arthritis Mother      Depression Mother      Drug abuse Mother      Mental illness Mother      Stroke Father      Early death Father      Mental illness Sister         ADHD and anger issues     Alcohol abuse Maternal Aunt      Drug abuse Maternal Aunt      Hypertension Maternal Aunt      Kidney disease Maternal Aunt      Early death Paternal Uncle      Stroke Paternal Uncle      Heart disease Maternal Grandmother      Hearing loss Maternal Grandmother      Arthritis Maternal Grandmother      Dementia Maternal Grandfather      Hypertension Maternal Grandfather      Diabetes Paternal Grandmother      Hypertension Paternal Grandmother      Arthritis Paternal Grandfather      Asthma Paternal Grandfather      Cancer Paternal Grandfather      Depression Paternal Grandfather      Hearing loss Paternal Grandfather      Stroke Paternal Grandfather      Social History     Socioeconomic History     Marital status: Single     Spouse name: Rubens     Number of children: None      Years of education: come jose     Highest education level: None   Occupational History     Occupation: unemployed   Social Needs     Financial resource strain: None     Food insecurity:     Worry: None     Inability: None     Transportation needs:     Medical: None     Non-medical: None   Tobacco Use     Smoking status: Former Smoker     Last attempt to quit: 2019     Years since quittin.8     Smokeless tobacco: Never Used   Substance and Sexual Activity     Alcohol use: No     Drug use: No     Sexual activity: Yes     Birth control/protection: I.U.D., None   Lifestyle     Physical activity:     Days per week: None     Minutes per session: None     Stress: None   Relationships     Social connections:     Talks on phone: None     Gets together: None     Attends Sabianist service: None     Active member of club or organization: None     Attends meetings of clubs or organizations: None     Relationship status: None     Intimate partner violence:     Fear of current or ex partner: None     Emotionally abused: None     Physically abused: None     Forced sexual activity: None   Other Topics Concern     None   Social History Narrative     None     Past Surgical History:   Procedure Laterality Date      SECTION N/A 2016    Procedure:  SECTION;  Surgeon: Reno Alamo MD;  Location: [facility] L[facility];  Service:      COLPOSCOPY  2012     ENDOMETRIAL ABLATION      during one of the laparotomies     EXPLORATORY LAPAROTOMY      3x - last in 2015, 2010, Gyne surgeon in  states no contraindication to vaginal birth per patient     IUD removal  2015    laproscopic procedure     LAPAROSCOPIC HYSTERECTOMY N/A 2018    Procedure: ROBOTC TOTAL LAPAROSCOPIC HYSTERECTOMY, BILATERAL SALPINGECTOMY ;  Surgeon: Reno Alamo MD;  [place] [facility] [place]  Service:      ID LAP,FULGURATE/EXCISE LESIONS Right 2017    Procedure: LAPAROSCOPY, OVARIAN CYSTECTOMY;  Surgeon: Reno ALVES  MD Jasmeet;  Location: [facility];  Service: Gynecology     Current Outpatient Medications on File Prior to Visit   Medication Sig Dispense Refill     ferrous sulfate 325 (65 FE) MG tablet Take 1 tablet by mouth daily with breakfast.       ibuprofen (ADVIL,MOTRIN) 600 MG tablet Take 1 tablet (600 mg total) by mouth every 6 (six) hours as needed for pain. 30 tablet 0     metoclopramide (REGLAN) 10 MG tablet Take 1 tablet (10 mg total) by mouth every 6 (six) hours as needed for nausea. 30 tablet 0     No current facility-administered medications on file prior to visit.      Allergies   Allergen Reactions     Blood-Group Specific Substance Other (See Comments)     Warm autoantibodies present. Blood for transfusion will be delayed.  Draw 3 lavender and 1 red tube for type and screen orders.     Morphine Nausea And Vomiting and Myalgia     Nerve pain     OB History        3    Para   2    Term   1       1    AB   1    Living   2       SAB   1    TAB        Ectopic        Multiple   0    Live Births   2               I have reviewed the patient's medical history in detail and updated the computerized patient record.  OBJECTIVE:  Wt Readings from Last 3 Encounters:   20 180 lb (81.6 kg)   20 179 lb (81.2 kg)   20 172 lb (78 kg)     Temp Readings from Last 3 Encounters:   20 98.3  F (36.8  C) (Oral)   10/19/19 97.6  F (36.4  C) (Oral)   19 98.4  F (36.9  C) (Oral)     BP Readings from Last 3 Encounters:   20 120/70   20 128/72   20 136/61     Pulse Readings from Last 3 Encounters:   20 66   20 67   10/19/19 66     Body mass index is 28.23 kg/m .     Alert, cooperative, well-hydrated. Appears well.  Eyes: Pupils equal, round, reactive to light.  HEENT: Sclera white, nares patent, MMM, TM's pearly bilaterally  Neck: supple, without lymphadenopathy, Thyroid freely movable and without hypotrophy or nodularity.   Lungs: Clear to auscultation. No  retractions, no increased work of respiration, equal chest rise.   Heart: Regular rate and rhythm, no murmurs, clicks,   Gallops.  Abdomen: Soft, bowel sounds in 4 quadrants with no tenderness to palpation, no organomegaly or masses, no aortic or renal bruits.  Extremities: no tenderness to palpation of gastrocnemius, bilaterally.  Skin: no increased warmth, edema, or erythema of lower legs bilaterally.  Back: No cervical, thoracic or lumbar tenderness to spinous processes or musculature.  Neuro::pupils equal and reactive to light bilaterally, CN II - XII grossly intact. No focal motor/sensory deficits. DTR 2/4 all 4 extremities. Muscle Strength 5/5 all extremities, Rhomberg negative  Labs:  No visits with results within 3 Month(s) from this visit.   Latest known visit with results is:   Admission on 10/19/2019, Discharged on 10/19/2019   Component Date Value     Color, UA 10/19/2019 Yellow      Clarity, UA 10/19/2019 Clear      Glucose, UA 10/19/2019 Negative      Bilirubin, UA 10/19/2019 Negative      Ketones, UA 10/19/2019 Negative      Specific Gravity, UA 10/19/2019 1.030      Blood, UA 10/19/2019 Negative      pH, UA 10/19/2019 6.0      Protein, UA 10/19/2019 30 mg/dL*     Urobilinogen, UA 10/19/2019 <2.0 E.U./dL      Nitrite, UA 10/19/2019 Negative      Leukocytes, UA 10/19/2019 Negative      Bacteria, UA 10/19/2019 None Seen      RBC, UA 10/19/2019 0-2      WBC, UA 10/19/2019 0-5      Squam Epithel, UA 10/19/2019 5-10*     Mucus, UA 10/19/2019 Many*     Sodium 10/19/2019 139      Potassium 10/19/2019 3.9      Chloride 10/19/2019 111*     CO2 10/19/2019 20*     Anion Gap, Calculation 10/19/2019 8      Glucose 10/19/2019 91      Calcium 10/19/2019 9.2      BUN 10/19/2019 16      Creatinine 10/19/2019 0.77      GFR MDRD Af Amer 10/19/2019 >60      GFR MDRD Non Af Amer 10/19/2019 >60      Alcohol, Blood 10/19/2019 <10      Bilirubin, Total 10/19/2019 0.6      Bilirubin, Direct 10/19/2019 0.2      Protein, Total  10/19/2019 7.8      Albumin 10/19/2019 4.1      Alkaline Phosphatase 10/19/2019 42*     AST 10/19/2019 20      ALT 10/19/2019 22      Lipase 10/19/2019 <9      Chlamydia trachomatis, A* 10/19/2019 Negative      Neisseria gonorrhoeae, A* 10/19/2019 Negative      Yeast Result 10/19/2019 Yeast Seen*     Trichomonas 10/19/2019 No Trichomonas seen      Clue Cells, Wet Prep 10/19/2019 No Clue cells seen      WBC 10/19/2019 6.2      RBC 10/19/2019 3.96      Hemoglobin 10/19/2019 12.4      Hematocrit 10/19/2019 36.5      MCV 10/19/2019 92      MCH 10/19/2019 31.3      MCHC 10/19/2019 34.0      RDW 10/19/2019 12.7      Platelets 10/19/2019 248      MPV 10/19/2019 9.1      Neutrophils % 10/19/2019 70      Lymphocytes % 10/19/2019 22      Monocytes % 10/19/2019 7      Eosinophils % 10/19/2019 1      Basophils % 10/19/2019 0      Neutrophils Absolute 10/19/2019 4.3      Lymphocytes Absolute 10/19/2019 1.3      Monocytes Absolute 10/19/2019 0.4      Eosinophils Absolute 10/19/2019 0.1      Basophils Absolute 10/19/2019 0.0      ASSESMENT/PLAN:  1. Migraine with aura and with status migrainosus, not intractable  SUMAtriptan (IMITREX) 50 MG tablet   Refills given of Imitrex.  Follow-up for physical.  Adela Garner, MS, PA-C 02/11/20

## 2021-06-06 NOTE — TELEPHONE ENCOUNTER
Triage call:   States that she thinks she might have had a seizure  Yesterday she reports that she was very sick   She states that she went into the bathroom this morning to throw up and she states that she woke up on the bathroom floor feeling her head shaking and arms shaking. Doesn't remember what happened- LOC.   No hx of seizure disorder.   Still feels weak and shaky- boyfriend was able to get her to bed after the incident.     Triaged to be seen in the ER now - she agrees to go into [facility] to be seen today- patient agrees and will go into be seen.     Lacie Lewis RN BSBA [facility] Triage/Med Refill 3/6/2020 7:22 AM    Reason for Disposition    Fainted > 15 minutes ago and still feels weak or dizzy    Protocols used: [id number]-NB

## 2021-06-08 NOTE — TELEPHONE ENCOUNTER
RN Triage  Georgia is calling today reporting issues with her lower back, was getting steroid injections last year. Her specialist is not currently seeing patients.  Tried to lift something yesterday and immediately felt shooting pains in her back and into left leg. She says she can barely move without pain, cannot stand up straight. She tried last night using ice, heat, rest, ibuprofen with no relief. Did not sleep well last night. Reports herniated L2-L3 in the past, wondering if happened again or completely displaced. In left hip, she reports tingling from hip joint to knee. She says her pelvic bones feel numb and numbness on the left inner groin.    I advised Georgia that given numbness, she should be seen in ER today. She agrees to this plan.    Diane Treadwell RN  [facility] Nurse Advisor      Reason for Disposition    Numbness (loss of sensation) in groin or rectal area    Protocols used: BACK PAIN-A-OH    COVID 19 Nurse Triage Plan/Patient Instructions    Please be aware that novel coronavirus (COVID-19) may be circulating in the community. If you develop symptoms such as fever, cough, or SOB or if you have concerns about the presence of another infection including coronavirus (COVID-19), please contact your health care provider or visit [email or web address].     Disposition/Instructions    Patient to go to ED and follow protocol based instructions. Follow System Ambulatory Workflow for COVID 19.     Bring Your Own Device:  Please also bring your smart device(s) (smart phones, tablets, laptops) and their charging cables for your personal use and to communicate with your care team during your visit.      Thank you for limiting contact with others, wearing a simple mask to cover your cough, practice good hand hygiene habits and accessing our virtual services where possible to limit the spread of this virus.    For more information about COVID19 and options for caring for yourself at home, please visit the CDC  website at https://www.cdc.gov/coronavirus/2019-ncov/about/steps-when-sick.html  For more options for care at [facility], please visit our website at [email or web address]    For more information, please use the [place] Department of Health COVID-19 Website: [email or web address].[email or web address]./diseases/coronavirus/index.html  [place] Department of Health ([facility]) COVID-19 Hotlines (Interpreters available):      Health questions: Phone Number: [phone] or [phone] and Hours: 7 a.m. to 7 p.m.    Schools and  questions: Phone Number: [phone] or [phone] and Hours 7 a.m. to 7 p.m.

## 2021-06-10 NOTE — ANESTHESIA POSTPROCEDURE EVALUATION
Patient: Cassie Morrison  LAPAROSCOPY, OVARIAN CYSTECTOMY  Anesthesia type: general    Patient location: Phase II Recovery  Last vitals:   Vitals:    05/05/17 1900   BP: 112/60   Pulse: (!) 49   Resp: 28   Temp: 36.8  C (98.3  F)   SpO2: 100%     Post vital signs: stable  Level of consciousness: awake and responds to simple questions  Post-anesthesia pain: pain controlled  Post-anesthesia nausea and vomiting: no  Pulmonary: unassisted, return to baseline  Cardiovascular: stable and blood pressure at baseline  Hydration: adequate  Anesthetic events: no    QCDR Measures:  ASA# 11 - Latoya-op Cardiac Arrest: ASA11B - Patient did NOT experience unanticipated cardiac arrest  ASA# 12 - Latoya-op Mortality Rate: ASA12B - Patient did NOT die  ASA# 13 - PACU Re-Intubation Rate: ASA13B - Patient did NOT require a new airway mgmt  ASA# 10 - Composite Anes Safety: ASA10A - No serious adverse event  ASA# 38 - New Corneal Injury: ASA38A - No new exposure keratitis or corneal abrasion in PACU    Additional Notes:

## 2021-06-10 NOTE — ANESTHESIA CARE TRANSFER NOTE
Last vitals:   Vitals:    05/05/17 1822   BP: 116/60   Pulse: 72   Resp: 16   Temp: 36.9  C (98.4  F)   SpO2: 100%     Patient's level of consciousness is awake  Spontaneous respirations: yes  Maintains airway independently: yes  Dentition unchanged: yes  Oropharynx: oropharynx clear of all foreign objects    QCDR Measures:  ASA# 20 - Surgical Safety Checklist: ASA20A - Safety Checks Done  PQRS# 430 - Adult PONV Prevention: 4558F - Pt received => 2 anti-emetic agents (different classes) preop & intraop  ASA# 8 - Peds PONV Prevention: NA - Not pediatric patient, not GA or 2 or more risk factors NOT present  PQRS# 424 - Latoya-op Temp Management: 4559F - At least one body temp DOCUMENTED => 35.5C or 95.9F within required timeframe  PQRS# 426 - PACU Transfer Protocol: - Transfer of care checklist used  ASA# 14 - Acute Post-op Pain: ASA14B - Patient did NOT experience pain >= 7 out of 10    I completed my SBAR handoff to the receiving nurse per policy and procedure.

## 2021-06-10 NOTE — TELEPHONE ENCOUNTER
Georgia was into ED two days ago and diagnosed with shingles and is located near right eye and was told if pain does not get better to return.  Pain is severe.  Georgia states that she cannot afford medication that was prescribed and pain is severe.  Georgia is going to return to ED.  Denies fever cough and shortness of breath.    COVID 19 Nurse Triage Plan/Patient Instructions    Please be aware that novel coronavirus (COVID-19) may be circulating in the community. If you develop symptoms such as fever, cough, or SOB or if you have concerns about the presence of another infection including coronavirus (COVID-19), please contact your health care provider or visit [email or web address].     Disposition/Instructions    In-Person Visit with provider recommended. Reference Visit Selection Guide.    Thank you for taking steps to prevent the spread of this virus.  o Limit your contact with others.  o Wear a simple mask to cover your cough.  o Wash your hands well and often.    Resources    [facility]: About COVID-19: [email or web address]    CDC: What to Do If You're Sick: www.cdc.gov/coronavirus/2019-ncov/about/steps-when-sick.html    CDC: Ending Home Isolation: www.cdc.gov/coronavirus/2019-ncov/hcp/disposition-in-home-patients.html     CDC: Caring for Someone: www.cdc.gov/coronavirus/2019-ncov/if-you-are-sick/care-for-someone.html     [facility]: Interim Guidance for Hospital Discharge to Home: www.health.[facility].[place]/diseases/coronavirus/hcp/hospdischarge.pdf    [facility] clinical trials (COVID-19 research studies): clinicalaffairs.[facility]/n-clinical-trials     Below are the COVID-19 hotlines at the [place] Department of Health ([facility]). Interpreters are available.   o For health questions: Call 375-614-9832 or 1-846.624.5487 (7 a.m. to 7 p.m.)  o For questions about schools and childcare: Call 246-253-8799 or 1-362.465.8850 (7 a.m. to 7 p.m.)       Additional Information    Negative: Difficult to  awaken or acting confused (e.g., disoriented, slurred speech)    Negative: Sounds like a life-threatening emergency to the triager    Negative: Patient sounds very sick or weak to the triager    [1] Shingles rash (matches SYMPTOMS) AND [2] weak immune system (e.g., HIV positive,  cancer chemotherapy, chronic steroid treatment, splenectomy) AND [3] NOT taking antiviral medication    Protocols used: SHINGLES-ADAMA

## 2021-06-10 NOTE — TELEPHONE ENCOUNTER
"RN Triage:   Seen in the ED yesterday ear pain and eye pain. \"Stone in her parotid gland\" per patient.   Pain med, antinausea, antibiotic  Got worse over night. No sleep.   Right eye is now red and swollen and painful. Not swollen shut. Behind the eye is very painful. Pain is 10/10 behind the right eye. Unable to keep her eye open. NO blurred vision. She is having continuous right eye watering.   Hives on the right side of the scalp and forehead. Unknown by patient what might be causing this, morphine allergy listed. NO difficulty breathing.   NO imaging done per patient.   Nose bleed on the right. She passed a \"huge clot\" per patient. It was a quarter sized clot, nose is still bleeding. She was advised on proper technique to stop a nose bleed.   Right head middle of forehead to the right ear is hot and red.  No fever noted.   Advised to stop the medication until evaluated.   Advised ED for evaluation.     Marisa Evangelista RN, BSN [facility] Triage Nurse          Reason for Disposition    Severe eye pain    [1] Nosebleed AND [2] bleeding now BUT [3] not using correct technique    Taking new prescription antibiotic (Exception: finished taking new prescription antibiotic)    Taking new prescription medicine  (Exceptions: finished taking new prescription antibiotic OR questions about flushing from niacin)    Additional Information    Negative: Followed an eye injury    Negative: Eye pain from chemical in the eye    Negative: Eye pain from foreign body in eye    Negative: [1] Tender, red lump or pimple AND [2] located along the eyelid margin    Negative: Has sinus pain or pressure    Negative: [1] Mild-moderate nosebleed AND [2] bleeding stopped now    Negative: [1] Nosebleed AND [2] bleeding present < 30 minutes AND [3] using correct technique per guideline    Negative: Hard-to-stop nosebleeds are a chronic symptom (recurrent or ongoing AND present > 4 weeks)    Negative: Easy bleeding present in other family " members    Negative: Has nasal packing (inserted by health care provider to control bleeding)    Negative: [1] Bleeding recurs 3 or more times in 24 hours AND [2] direct pressure applied correctly    Negative: [1] Skin bruises or bleeding gums AND [2] not caused by an injury    Negative: [1] Large amount of blood has been lost (e.g., 1 cup or 240 ml) AND [2] bleeding now controlled (stopped)    Negative: [1] Has nasal packing AND [2] fever > 100.5 F (38.1 C)    Negative: Taking Coumadin (warfarin) or other strong blood thinner, or known bleeding disorder (e.g., thrombocytopenia)    Negative: Patient sounds very sick or weak to the triager    Negative: [1] Bleeding present > 30 minutes AND [2] using correct method of direct pressure    Negative: [1] Bleeding now AND [2] second call after being instructed in correct technique of direct pressure    Negative: Dizziness or lightheadedness    Negative: Pale skin (pallor) of new onset or worsening    Negative: [1] Has nasal packing (inserted by health care provider to control bleeding) AND [2] new rash    Negative: [1] Has nasal packing AND [2] now bleeding around the packing (Exception: few drops or ooze)    Negative: Nosebleed followed a nose injury    Negative: Fainted or too weak to stand following large blood loss    Negative: Sounds like a life-threatening emergency to the triager    Negative: Hives or itching    Negative: [1] Purple or blood-colored rash (spots or dots) AND [2] no fever AND [3] sounds well to triager    Negative: [1] Taking new prescription medication AND [2] rash within 4 hours of 1st dose    Negative: Large or small blisters on skin (i.e., fluid filled bubbles or sacs)    Negative: Bloody crusts on lips or sores in mouth    Negative: Face or lip swelling    Negative: Fever    Negative: Patient sounds very sick or weak to the triager    Negative: Swollen tongue    Negative: [1] Widespread hives AND [2] onset < 2 hours of exposure to 1st dose of  drug    Negative: Rash is only on 1 part of the body (localized)    Negative: Taking new non-prescription (OTC) antihistamine, decongestant, ear drops, eye drops, or other OTC cough/cold medicine    Negative: Taking new prescription antihistamine, allergy medicine, asthma medicine, eye drops, ear drops or nose drops    Negative: Rash started more than 3 days after stopping new prescription medicine    Negative: [1] Life-threatening reaction (anaphylaxis) in the past to the same drug AND [2] < 2 hours since exposure    Negative: Difficulty breathing or wheezing    Negative: [1] Hoarseness or cough AND [2] started soon after 1st dose of drug    Negative: [1] Swollen tongue AND [2] started soon after 1st dose of drug    Negative: [1] Purple or blood-colored rash (spots or dots) AND [2] fever    Negative: Sounds like a life-threatening emergency to the triager    Protocols used: EYE PAIN-A-AH, NOSEBLEED-A-AH, RASH - WIDESPREAD ON DRUGS-A-AH

## 2021-06-15 NOTE — ANESTHESIA POSTPROCEDURE EVALUATION
Patient: Cassie Morrison  [facility] TOTAL LAPAROSCOPIC HYSTERECTOMY, BILATERAL SALPINGECTOMY   Anesthesia type: general    Patient location: PACU  Last vitals:   Vitals:    01/31/18 1130   BP: 90/47   Pulse: (!) 57   Resp: 18   Temp: 36.3  C (97.3  F)   SpO2: 99%     Post vital signs: stable  Level of consciousness: awake and responds to simple questions  Post-anesthesia pain: pain controlled  Post-anesthesia nausea and vomiting: no  Pulmonary: unassisted, return to baseline  Cardiovascular: stable and blood pressure at baseline  Hydration: adequate  Anesthetic events: no    QCDR Measures:  ASA# 11 - Latoya-op Cardiac Arrest: ASA11B - Patient did NOT experience unanticipated cardiac arrest  ASA# 12 - Latoya-op Mortality Rate: ASA12B - Patient did NOT die  ASA# 13 - PACU Re-Intubation Rate: ASA13B - Patient did NOT require a new airway mgmt  ASA# 10 - Composite Anes Safety: ASA10A - No serious adverse event    Additional Notes:

## 2021-06-15 NOTE — ANESTHESIA CARE TRANSFER NOTE
Last vitals:   Vitals:    01/31/18 1101   BP: 118/62   Pulse: 62   Resp: 14   Temp: 36.2  C (97.2  F)   SpO2: 100%     Patient spontaneous RR, TV 400s, suctioned, following commands, extubated to facemask 10LPM, O2 sats 100%. VSS. Report to RN.    Patient's level of consciousness is drowsy  Spontaneous respirations: yes  Maintains airway independently: yes  Dentition unchanged: yes  Oropharynx: oropharynx clear of all foreign objects    QCDR Measures:  ASA# 20 - Surgical Safety Checklist: WHO surgical safety checklist completed prior to induction  PQRS# 430 - Adult PONV Prevention: 4558F - Pt received => 2 anti-emetic agents (different classes) preop & intraop  ASA# 8 - Peds PONV Prevention: NA - Not pediatric patient, not GA or 2 or more risk factors NOT present  PQRS# 424 - Latoya-op Temp Management: 4559F - At least one body temp DOCUMENTED => 35.5C or 95.9F within required timeframe  PQRS# 426 - PACU Transfer Protocol: - Transfer of care checklist used  ASA# 14 - Acute Post-op Pain: ASA14B - Patient did NOT experience pain >= 7 out of 10

## 2021-06-15 NOTE — ANESTHESIA PROCEDURE NOTES
Peripheral Block    Patient location during procedure: pre-op  Start time: 1/31/2018 11:03 AM  End time: 1/31/2018 11:11 AM  post-op analgesia per surgeon order as noted in medical record  Staffing:  Performing  Anesthesiologist: DAISY AMBROSIO  Preanesthetic Checklist  Completed: patient identified, site marked, risks, benefits, and alternatives discussed, timeout performed, consent obtained, airway assessed, oxygen available, suction available, emergency drugs available and hand hygiene performed  Peripheral Block  Block type: other, TAP  Prep: ChloraPrep  Patient position: supine  Patient monitoring: blood pressure, heart rate, continuous pulse oximetry and cardiac monitor  Anesthesia laterality: bilateral.  Injection technique: ultrasound guided    Ultrasound used to visualize needle placement in proximity to nerve being blocked: yes   Permanent ultrasound image captured for medical record      Needle  Needle type: Stimuplex   Needle gauge: 21 G  Needle length: 4 in  no peripheral nerve catheter placed  Assessment  Injection assessment: no difficulty with injection, negative aspiration for heme, no paresthesia on injection and incremental injection

## 2021-06-17 NOTE — PATIENT INSTRUCTIONS - HE
Patient Instructions by Marisol Edward PT at 4/17/2019  3:30 PM     Author: Marisol Edward PT Service: -- Author Type: Physical Therapist    Filed: 4/17/2019  4:16 PM Encounter Date: 4/17/2019 Status: Signed    : Marisol Edward PT (Physical Therapist)           Lie on your back   - Grab behind your knee slightly pulling your knee to your chest (you can use a towel if needed)   - Straighten the leg as far as you can. Get a nice easy stretch. Do not hold   - Bring the leg down   - Repeat 10-15 x up to 5 times a day, at least 3   Femoral Stretch    Lie down flat on your stomach. Wrap a strap (belt, towel, dog leash) around the top of one of your feet and pull the strap with your arms.    Pull up to as much knee bend as you can and relax.  Do not hold.    Repeat 10-15x, up to 5 times a day at least 3x a day             PRONE ON ELBOWS - LACEY    Lying face down, slowly raise up and prop yourself up on your elbows.      HOLD for 5 seconds, lay flat again for 5 seconds    Repeat 10 times.    2-3 x a day

## 2021-07-03 NOTE — ANESTHESIA PREPROCEDURE EVALUATION
Anesthesia Preprocedure Evaluation by Hiram Palacios MD at 5/5/2017  5:01 PM     Author: Hiram Palacios MD Service: -- Author Type: Physician    Filed: 5/5/2017  5:03 PM Date of Service: 5/5/2017  5:01 PM Status: Signed    : Hiram Palacios MD (Physician)       Anesthesia Evaluation      Patient summary reviewed   No history of anesthetic complications     Airway   Mallampati: I  Neck ROM: full   Pulmonary - negative ROS and normal exam                          Cardiovascular - negative ROS and normal exam  Rhythm: regular  Rate: normal,         Neuro/Psych    (+) depression,     Endo/Other - negative ROS      GI/Hepatic/Renal - negative ROS   (-) renal disease          Dental    (+) chipped                           Anesthesia Plan  Planned anesthetic: general endotracheal    ASA 1   Induction: intravenous   Anesthetic plan and risks discussed with: patient  Anesthesia plan special considerations: antiemetics,   Post-op plan: routine recovery

## 2021-07-25 ENCOUNTER — HEALTH MAINTENANCE LETTER (OUTPATIENT)
Age: 31
End: 2021-07-25

## 2021-09-19 ENCOUNTER — HEALTH MAINTENANCE LETTER (OUTPATIENT)
Age: 31
End: 2021-09-19

## 2021-12-29 ENCOUNTER — NURSE TRIAGE (OUTPATIENT)
Dept: NURSING | Facility: CLINIC | Age: 31
End: 2021-12-29
Payer: MEDICAID

## 2021-12-29 NOTE — TELEPHONE ENCOUNTER
-(situation):   Call from patient who says her cat bit her last night. She sustained puncture wound. She cleaned with peroxide and put ointment on it. Today it is inflamed and swollen, hot to the touch, and says there was a small amount of purulent discharge on the gauze she used.    B-(background):       A-(assessment): needs to be evaluated for infection      R-(recommendations): be seen within 4 hrs    Reviewed care advice with caller per RN triage protocol guideline.  Advised to call back with worsening symptoms, concerns or questions.   Caller verbalized understanding.          Eliazar Infante RN/[facility]    Reason for Disposition    [1] Puncture wound (hole through the skin) AND [2] from a cat bite (or deep claw puncture wound)    Additional Information    Negative: [1] Major bleeding (e.g., actively dripping or spurting) AND [2] can't be stopped    Negative: Sounds like a life-threatening emergency to the triager    Negative: [1] Any break in skin from BITE (e.g., cut, puncture or scratch) AND[2] WILD animal at risk for RABIES (e.g., bat, raccoon, moya, skunk, coyote, other carnivores)    Negative: [1] Any break in skin from BITE (e.g., cut, puncture or scratch) AND[2] PET animial (e.g., dog, cat, or ferret) at risk for RABIES (e.g., sick, stray, unprovoked bite, developing country)    Negative: [1] Any break in skin from BITE (e.g., cut, puncture or scratch) AND[2] monkey    Negative: [1] EXPOSURE of non-intact skin (e.g., exposed person has dermatitis, abrasion, wound) AND[2] with animal BODY FLUID (e.g., saliva such as licking, blood, brain) AND[3] animal at high-risk for RABIES (e.g., bat, raccoon, moya, skunk, coyote, other carnivores)    Negative: [1] Cut (length > 1/8 inch or 3 mm) or skin tear AND [2] any animal    Negative: [1] Bleeding AND [2] won't stop after 10 minutes of direct pressure (using correct technique)    Negative: Description of bite sounds severe to the triager    Protocols  used: ANIMAL BITE-A-AH

## 2022-06-02 ENCOUNTER — HOSPITAL ENCOUNTER (EMERGENCY)
Facility: CLINIC | Age: 32
Discharge: HOME | End: 2022-06-02
Attending: EMERGENCY MEDICINE | Admitting: EMERGENCY MEDICINE
Payer: MEDICAID

## 2022-06-02 ENCOUNTER — APPOINTMENT (OUTPATIENT)
Dept: CT IMAGING | Facility: CLINIC | Age: 32
End: 2022-06-02
Attending: STUDENT IN AN ORGANIZED HEALTH CARE EDUCATION/TRAINING PROGRAM
Payer: MEDICAID

## 2022-06-02 VITALS
HEART RATE: 80 BPM | DIASTOLIC BLOOD PRESSURE: 70 MMHG | BODY MASS INDEX: 20.72 KG/M2 | RESPIRATION RATE: 16 BRPM | TEMPERATURE: 98.9 F | WEIGHT: 132 LBS | OXYGEN SATURATION: 97 % | SYSTOLIC BLOOD PRESSURE: 112 MMHG | HEIGHT: 67 IN

## 2022-06-02 DIAGNOSIS — G43.819 OTHER MIGRAINE WITHOUT STATUS MIGRAINOSUS, INTRACTABLE: ICD-10-CM

## 2022-06-02 PROCEDURE — 99285 EMERGENCY DEPT VISIT HI MDM: CPT | Mod: 25

## 2022-06-02 PROCEDURE — 258N000003 HC RX IP 258 OP 636: Performed by: EMERGENCY MEDICINE

## 2022-06-02 PROCEDURE — 70450 CT HEAD/BRAIN W/O DYE: CPT

## 2022-06-02 PROCEDURE — 96374 THER/PROPH/DIAG INJ IV PUSH: CPT

## 2022-06-02 PROCEDURE — 250N000011 HC RX IP 250 OP 636: Performed by: EMERGENCY MEDICINE

## 2022-06-02 PROCEDURE — 96375 TX/PRO/DX INJ NEW DRUG ADDON: CPT

## 2022-06-02 PROCEDURE — 96361 HYDRATE IV INFUSION ADD-ON: CPT

## 2022-06-02 RX ORDER — KETOROLAC TROMETHAMINE 15 MG/ML
15 INJECTION, SOLUTION INTRAMUSCULAR; INTRAVENOUS ONCE
Status: COMPLETED | OUTPATIENT
Start: 2022-06-02 | End: 2022-06-02

## 2022-06-02 RX ORDER — ONDANSETRON 2 MG/ML
4 INJECTION INTRAMUSCULAR; INTRAVENOUS ONCE
Status: COMPLETED | OUTPATIENT
Start: 2022-06-02 | End: 2022-06-02

## 2022-06-02 RX ADMIN — SODIUM CHLORIDE 1000 ML: 9 INJECTION, SOLUTION INTRAVENOUS at 14:29

## 2022-06-02 RX ADMIN — ONDANSETRON 4 MG: 2 INJECTION INTRAMUSCULAR; INTRAVENOUS at 14:32

## 2022-06-02 RX ADMIN — KETOROLAC TROMETHAMINE 15 MG: 15 INJECTION, SOLUTION INTRAMUSCULAR; INTRAVENOUS at 14:32

## 2022-06-02 NOTE — Clinical Note
Cassie Morrison was seen and treated in our emergency department on 6/2/2022.  She may return to work on 06/04/2022.       If you have any questions or concerns, please don't hesitate to call.      Curly Barnes MD

## 2022-06-02 NOTE — DISCHARGE INSTRUCTIONS
Your symptoms today appear to be due to a migraine.  Continue your previously prescribed medications.  Follow-up with neurology as an outpatient for further evaluation and return to the ER for any worsening symptoms or other concerns.

## 2022-06-02 NOTE — ED TRIAGE NOTES
"10 day history of right sided migraine. Has progressively gotten worse with nausea and vomiting. Has tried imitrex, excedrine migraine, hot and cold application without relief. Did go to an ED on Monday after having a near syncopal episode and was given \"migraine cocktail\" without any relief.  They did not have access to a CT. Patient states that she does not usually have a migraine this long and thinks she would like to have a CT     Triage Assessment     Row Name 06/02/22 1251       Triage Assessment (Adult)    Airway WDL WDL       Respiratory WDL    Respiratory WDL WDL       Skin Circulation/Temperature WDL    Skin Circulation/Temperature WDL WDL       Cardiac WDL    Cardiac WDL WDL       Peripheral/Neurovascular WDL    Peripheral Neurovascular WDL WDL       Cognitive/Neuro/Behavioral WDL    Cognitive/Neuro/Behavioral WDL WDL              "

## 2022-06-02 NOTE — ED PROVIDER NOTES
EMERGENCY DEPARTMENT ENCOUnter      NAME: Cassie Morrison  AGE: 31 year old female  YOB: 1990  MRN: [id number]  EVALUATION DATE & TIME: No admission date for patient encounter.    PCP: Reno Alamo    ED PROVIDER: Curly Barnes DO      Chief Complaint   Patient presents with     Headache         FINAL IMPRESSION:  1. Other migraine without status migrainosus, intractable          ED COURSE & MEDICAL DECISION MAKIN:14 PM I met with the patient for the initial interview and physical examination in the waiting room. Discussed plan for treatment and workup in the ED. PPE Worn: N95     The patient presented emerged from today with complaints of a headache.  She has a history of chronic migraines.  She has no acute neurologic deficits or other concerning findings on physical exam.  CT of the head was performed today which was unremarkable.  She was given migraine medications with mild improvement in her symptoms.  The patient is comfortable with the plan for discharge home.  I recommended that she follow-up with neurology as an outpatient for ongoing management of her chronic migraines.      At the conclusion of the encounter I discussed the results of all of the tests and the disposition. The questions were answered. The patient or family acknowledged understanding and was agreeable with the care plan.         MEDICATIONS GIVEN IN THE EMERGENCY:  Medications   0.9% sodium chloride BOLUS (0 mLs Intravenous Stopped 22 1620)   ketorolac (TORADOL) injection 15 mg (15 mg Intravenous Given 22 1432)   ondansetron (ZOFRAN) injection 4 mg (4 mg Intravenous Given 22 1432)           =================================================================    HPI        Cassie Morrison is a 31 year old female with a pertinent history of heart murmur, anemia, and chronic migraines  who presents to this ED by walk in for evaluation of migraine.    Per chart review, patient was evaluated at  "[facility] on 5/23/22 for an acute non intractable headache where she was given toradol, compazine, and benadryl. She was discharged with plan to follow up with her primary care provider.    Patient reports a persisting right-sided migraine for the past 10 days that has increased in severity in the last 48 hours. She notes associated nausea, vomiting, light/noise sensitivity, and tingling in her fingertips. Patient reports that on Monday (5/30/22) that the pain was so severe that she had temporarily blacked out from the pain but managed to catch herself before falling to the floor. She states that she has tried imitrex, excedrin, acetaminophen, cold packs, and multiple home remedies without success in relieving symptoms. Patient states \"migraine cocktail\" administered on 5/23/22 at [facility] did not help with symptoms, and had given her anxiety and shakiness. She states that when she went to work today, the pain was so bad that she was in immediate tears and had been asked to leave. Patient reports that her migraines usually last 24-48 hours. Patient denies recent falls or head trauma. Patient denies additional symptoms or complaints at this time.      Social History: Works with machinery in production (noisy environment)     REVIEW OF SYSTEMS     Constitutional:  Denies fever or chills  HENT:  Denies sore throat   Respiratory:  Denies cough or shortness of breath   Cardiovascular:  Denies chest pain or palpitations  GI:  Denies abdominal pain. Positive for nausea and vomiting  Musculoskeletal:  Denies any new extremity pain   Skin:  Denies rash   Neurologic:  Denies focal weakness. Positive for headache and light/noise sensitivity    All other systems reviewed and are negative      PAST MEDICAL HISTORY:  Past Medical History:   Diagnosis Date     Abnormal Pap smear of cervix 2012    Repeat WNL     Allergic     Morphine     Anemia      Anxiety      Chickenpox     x2     Depression     seasonal - been dx since 14     " "Endometriosis      Endometriosis     dx at 14, 3 laproscopic procedures     Endometriosis      Heart murmur      IUD migration      Kidney stone     1 instance - prior to       Malignant hyperthermia due to anesthesia      Migraine     migraines - back of neck     Papanicolaou smear of cervix with low grade squamous intraepithelial lesion (LGSIL) 12     PID (pelvic inflammatory disease)     in an ER visit, \"got a shot\" - feels like it was misdiagnosed, endometriosis mis-diagnosed?     Postpartum depression     Was doing the postpartum period alone     Suicidal ideation 2015     Urinary tract infection      Varicella     twice as a child       PAST SURGICAL HISTORY:  Past Surgical History:   Procedure Laterality Date      SECTION N/A 2016    Procedure:  SECTION;  Surgeon: Reno Alamo MD;  Location: [facility] L[facility]D OR;  Service:      COLONOSCOPY       COLPOSCOPY       DILATION AND CURETTAGE SUCTION, TREAT INCOMPLETE       D&E     DILATION AND CURETTAGE, OPERATIVE HYSTEROSCOPY, COMBINED N/A 2015    D&C HSC, hysterectomy-2018     ENDOMETRIAL ABLATION      during one of the laparotomies     HC LAPAROSCOPIC IUD REMOVAL  2015     LAPAROSCOPIC HYSTERECTOMY N/A 2018    Procedure: ROBOTC TOTAL LAPAROSCOPIC HYSTERECTOMY, BILATERAL SALPINGECTOMY ;  Surgeon: Reno Alamo MD;  Location: [facility] OR;  Service:      LAPAROSCOPY  2010    endometriosis     LAPAROTOMY EXPLORATORY      3x - last in 2015, , Gyne surgeon in  states no contraindication to vaginal birth per patient     LASER CO2 LAPAROSCOPIC VAPORIZATION ENDOMETRIUM N/A 2015    vaporization of endometriosis     OTHER SURGICAL HISTORY  2015    IUD removallaproscopic procedure     MO LAP,FULGURATE/EXCISE LESIONS Right 2017    Procedure: LAPAROSCOPY, OVARIAN CYSTECTOMY;  Surgeon: Reno Alamo MD;  Location: [facility];  Service: Gynecology     " ZZC ORAL SURGERY PROCEDURE      wisdom teeth x 5           CURRENT MEDICATIONS:    ibuprofen (ADVIL/MOTRIN) 600 MG tablet  tiZANidine (ZANAFLEX) 2 MG tablet  venlafaxine (EFFEXOR-ER) 75 MG TB24 24 hr tablet        ALLERGIES:  Allergies   Allergen Reactions     Blood Transfusion Related (Informational Only) Other (See Comments)     Patient has a history of a clinically significant antibody against RBC antigens.  A delay in compatible RBCs may occur.     Morphine Unknown     Nerve pain     [place]        FAMILY HISTORY:  Family History   Problem Relation Age of Onset     Blood Disease Paternal Grandfather         anemia     Cancer Paternal Grandfather      Diabetes Paternal Grandmother      Cerebrovascular Disease Father      Cerebrovascular Disease Paternal Grandfather      Depression Mother      Alcohol/Drug Mother         meth     Alcohol/Drug Father         drugs     Arthritis Mother      Substance Abuse Mother      Mental Illness Mother      Early Death Father      Mental Illness Sister         ADHD and anger issues     Alcoholism Maternal Aunt      Substance Abuse Maternal Aunt      Hypertension Maternal Aunt      Kidney Disease Maternal Aunt      Early Death Paternal Uncle      Cerebrovascular Disease Paternal Uncle      Heart Disease Maternal Grandmother      Hearing Loss Maternal Grandmother      Arthritis Maternal Grandmother      Dementia Maternal Grandfather      Hypertension Maternal Grandfather      Hypertension Paternal Grandmother      Arthritis Paternal Grandfather      Asthma Paternal Grandfather      Depression Paternal Grandfather      Hearing Loss Paternal Grandfather        SOCIAL HISTORY:   Social History     Socioeconomic History     Marital status: Single     Spouse name: None     Number of children: None     Years of education: come jose     Highest education level: None   Tobacco Use     Smoking status: Former Smoker     Packs/day: 0.10     Types: Cigarettes     Quit date: 4/24/2019      "Years since quitting: 3.1     Smokeless tobacco: Never Used     Tobacco comment: smoking 10cig/day- down to 1-2 with pregnancy   Substance and Sexual Activity     Alcohol use: Yes     Alcohol/week: 0.0 standard drinks     Comment: 3 times a month     Drug use: No     Sexual activity: Yes     Partners: Male     Birth control/protection: Female Surgical, I.U.D., None     Comment: Pregnant   Other Topics Concern     Parent/sibling w/ CABG, MI or angioplasty before 65F 55M? No       VITALS:  Patient Vitals for the past 24 hrs:   BP Temp Temp src Pulse Resp SpO2 Height Weight   06/02/22 1724 112/70 -- -- 80 16 97 % -- --   06/02/22 1253 110/68 98.9  F (37.2  C) Oral 78 18 98 % 1.702 m (5' 7\") 59.9 kg (132 lb)       PHYSICAL EXAM    Constitutional:  Well developed, Well nourished,  HENT:  Normocephalic, Atraumatic, Bilateral external ears normal, Oropharynx moist, Nose normal.   Neck:  Normal range of motion, No meningismus, No stridor.   Eyes:  EOMI, Conjunctiva normal, No discharge.   Respiratory:  Normal breath sounds, No respiratory distress, No wheezing, No chest tenderness.   Cardiovascular:  Normal heart rate  GI:  Soft, No tenderness, No guarding  Musculoskeletal:   No tenderness to palpation or major deformities noted.   Integument:  Warm, Dry, No erythema, No rash.    Neurologic:  Alert & oriented x 3, Normal motor function, Normal sensory function, No focal deficits noted.   Psychiatric:  Affect normal, Judgment normal, Mood normal.          RADIOLOGY:  I have independently reviewed and interpreted the above imaging, pending the final radiology read.  Head CT w/o contrast   Final Result   IMPRESSION:   1.  Normal head CT.              I, Adolfo Lucas, am serving as a scribe to document services personally performed by Dr. Barnes based on my observation and the provider's statements to me. I, Curly Barnes, DO attest that Adolfo Lucas is acting in a scribe capacity, has observed my performance of the services and " has documented them in accordance with my direction.    Curly Barnes,   Emergency Medicine  [facility] EMERGENCY ROOM  [address] [address] [phone]  Dept: [phone]     Curly Barnes MD  06/02/22 9782

## 2022-08-21 ENCOUNTER — HEALTH MAINTENANCE LETTER (OUTPATIENT)
Age: 32
End: 2022-08-21

## 2022-11-21 ENCOUNTER — HEALTH MAINTENANCE LETTER (OUTPATIENT)
Age: 32
End: 2022-11-21

## 2023-09-08 ENCOUNTER — APPOINTMENT (OUTPATIENT)
Dept: GENERAL RADIOLOGY | Facility: CLINIC | Age: 33
End: 2023-09-08
Attending: STUDENT IN AN ORGANIZED HEALTH CARE EDUCATION/TRAINING PROGRAM

## 2023-09-08 ENCOUNTER — HOSPITAL ENCOUNTER (EMERGENCY)
Facility: CLINIC | Age: 33
Discharge: HOME | End: 2023-09-09
Attending: STUDENT IN AN ORGANIZED HEALTH CARE EDUCATION/TRAINING PROGRAM | Admitting: STUDENT IN AN ORGANIZED HEALTH CARE EDUCATION/TRAINING PROGRAM

## 2023-09-08 DIAGNOSIS — M54.9 UPPER BACK PAIN ON LEFT SIDE: ICD-10-CM

## 2023-09-08 PROCEDURE — 99285 EMERGENCY DEPT VISIT HI MDM: CPT | Performed by: STUDENT IN AN ORGANIZED HEALTH CARE EDUCATION/TRAINING PROGRAM

## 2023-09-08 PROCEDURE — 80053 COMPREHEN METABOLIC PANEL: CPT | Performed by: STUDENT IN AN ORGANIZED HEALTH CARE EDUCATION/TRAINING PROGRAM

## 2023-09-08 PROCEDURE — 99285 EMERGENCY DEPT VISIT HI MDM: CPT | Mod: 25 | Performed by: STUDENT IN AN ORGANIZED HEALTH CARE EDUCATION/TRAINING PROGRAM

## 2023-09-08 PROCEDURE — 85610 PROTHROMBIN TIME: CPT | Performed by: STUDENT IN AN ORGANIZED HEALTH CARE EDUCATION/TRAINING PROGRAM

## 2023-09-08 PROCEDURE — 250N000011 HC RX IP 250 OP 636: Performed by: STUDENT IN AN ORGANIZED HEALTH CARE EDUCATION/TRAINING PROGRAM

## 2023-09-08 PROCEDURE — 250N000013 HC RX MED GY IP 250 OP 250 PS 637: Performed by: STUDENT IN AN ORGANIZED HEALTH CARE EDUCATION/TRAINING PROGRAM

## 2023-09-08 PROCEDURE — 71046 X-RAY EXAM CHEST 2 VIEWS: CPT

## 2023-09-08 PROCEDURE — 36415 COLL VENOUS BLD VENIPUNCTURE: CPT | Performed by: STUDENT IN AN ORGANIZED HEALTH CARE EDUCATION/TRAINING PROGRAM

## 2023-09-08 PROCEDURE — 85025 COMPLETE CBC W/AUTO DIFF WBC: CPT | Performed by: STUDENT IN AN ORGANIZED HEALTH CARE EDUCATION/TRAINING PROGRAM

## 2023-09-08 PROCEDURE — 84484 ASSAY OF TROPONIN QUANT: CPT | Performed by: STUDENT IN AN ORGANIZED HEALTH CARE EDUCATION/TRAINING PROGRAM

## 2023-09-08 PROCEDURE — 85379 FIBRIN DEGRADATION QUANT: CPT | Performed by: STUDENT IN AN ORGANIZED HEALTH CARE EDUCATION/TRAINING PROGRAM

## 2023-09-08 RX ORDER — HYDROMORPHONE HYDROCHLORIDE 1 MG/ML
0.5 INJECTION, SOLUTION INTRAMUSCULAR; INTRAVENOUS; SUBCUTANEOUS EVERY 30 MIN PRN
Status: DISCONTINUED | OUTPATIENT
Start: 2023-09-08 | End: 2023-09-09 | Stop reason: HOSPADM

## 2023-09-08 RX ORDER — BUPIVACAINE HYDROCHLORIDE 5 MG/ML
5 INJECTION, SOLUTION EPIDURAL; INTRACAUDAL ONCE
Status: COMPLETED | OUTPATIENT
Start: 2023-09-08 | End: 2023-09-08

## 2023-09-08 RX ORDER — DIAZEPAM 10 MG
10 TABLET ORAL ONCE
Status: COMPLETED | OUTPATIENT
Start: 2023-09-08 | End: 2023-09-08

## 2023-09-08 RX ORDER — ACETAMINOPHEN 500 MG
1000 TABLET ORAL ONCE
Status: COMPLETED | OUTPATIENT
Start: 2023-09-08 | End: 2023-09-08

## 2023-09-08 RX ORDER — IBUPROFEN 600 MG/1
600 TABLET, FILM COATED ORAL ONCE
Status: COMPLETED | OUTPATIENT
Start: 2023-09-08 | End: 2023-09-08

## 2023-09-08 RX ADMIN — IBUPROFEN 600 MG: 600 TABLET, FILM COATED ORAL at 22:29

## 2023-09-08 RX ADMIN — ACETAMINOPHEN 1000 MG: 500 TABLET ORAL at 22:29

## 2023-09-08 RX ADMIN — BUPIVACAINE HYDROCHLORIDE 25 MG: 5 INJECTION, SOLUTION EPIDURAL; INTRACAUDAL; PERINEURAL at 23:00

## 2023-09-08 RX ADMIN — DIAZEPAM 10 MG: 10 TABLET ORAL at 22:29

## 2023-09-08 ASSESSMENT — ACTIVITIES OF DAILY LIVING (ADL): ADLS_ACUITY_SCORE: 35

## 2023-09-09 ENCOUNTER — APPOINTMENT (OUTPATIENT)
Dept: CT IMAGING | Facility: CLINIC | Age: 33
End: 2023-09-09
Attending: STUDENT IN AN ORGANIZED HEALTH CARE EDUCATION/TRAINING PROGRAM

## 2023-09-09 VITALS
HEART RATE: 93 BPM | DIASTOLIC BLOOD PRESSURE: 76 MMHG | TEMPERATURE: 98.2 F | WEIGHT: 167.2 LBS | OXYGEN SATURATION: 99 % | RESPIRATION RATE: 16 BRPM | BODY MASS INDEX: 26.19 KG/M2 | SYSTOLIC BLOOD PRESSURE: 114 MMHG

## 2023-09-09 LAB
ALBUMIN SERPL BCG-MCNC: 3.9 G/DL (ref 3.5–5.2)
ALP SERPL-CCNC: 44 U/L (ref 35–104)
ALT SERPL W P-5'-P-CCNC: 24 U/L (ref 0–50)
ANION GAP SERPL CALCULATED.3IONS-SCNC: 9 MMOL/L (ref 7–15)
AST SERPL W P-5'-P-CCNC: 22 U/L (ref 0–45)
BASOPHILS # BLD AUTO: 0 10E3/UL (ref 0–0.2)
BASOPHILS NFR BLD AUTO: 0 %
BILIRUB SERPL-MCNC: 0.3 MG/DL
BUN SERPL-MCNC: 13.9 MG/DL (ref 6–20)
CALCIUM SERPL-MCNC: 9.4 MG/DL (ref 8.6–10)
CHLORIDE SERPL-SCNC: 104 MMOL/L (ref 98–107)
CREAT SERPL-MCNC: 0.69 MG/DL (ref 0.51–0.95)
D DIMER PPP FEU-MCNC: 0.56 UG/ML FEU (ref 0–0.5)
DEPRECATED HCO3 PLAS-SCNC: 24 MMOL/L (ref 22–29)
EGFRCR SERPLBLD CKD-EPI 2021: >90 ML/MIN/1.73M2
EOSINOPHIL # BLD AUTO: 0.1 10E3/UL (ref 0–0.7)
EOSINOPHIL NFR BLD AUTO: 1 %
ERYTHROCYTE [DISTWIDTH] IN BLOOD BY AUTOMATED COUNT: 12.8 % (ref 10–15)
GLUCOSE SERPL-MCNC: 106 MG/DL (ref 70–99)
HCT VFR BLD AUTO: 35.1 % (ref 35–47)
HGB BLD-MCNC: 11.9 G/DL (ref 11.7–15.7)
IMM GRANULOCYTES # BLD: 0 10E3/UL
IMM GRANULOCYTES NFR BLD: 0 %
INR PPP: 1.03 (ref 0.85–1.15)
LYMPHOCYTES # BLD AUTO: 1.7 10E3/UL (ref 0.8–5.3)
LYMPHOCYTES NFR BLD AUTO: 30 %
MCH RBC QN AUTO: 32 PG (ref 26.5–33)
MCHC RBC AUTO-ENTMCNC: 33.9 G/DL (ref 31.5–36.5)
MCV RBC AUTO: 94 FL (ref 78–100)
MONOCYTES # BLD AUTO: 0.4 10E3/UL (ref 0–1.3)
MONOCYTES NFR BLD AUTO: 8 %
NEUTROPHILS # BLD AUTO: 3.5 10E3/UL (ref 1.6–8.3)
NEUTROPHILS NFR BLD AUTO: 61 %
NRBC # BLD AUTO: 0 10E3/UL
NRBC BLD AUTO-RTO: 0 /100
PLATELET # BLD AUTO: 212 10E3/UL (ref 150–450)
POTASSIUM SERPL-SCNC: 3.5 MMOL/L (ref 3.4–5.3)
PROT SERPL-MCNC: 7.6 G/DL (ref 6.4–8.3)
RBC # BLD AUTO: 3.72 10E6/UL (ref 3.8–5.2)
SODIUM SERPL-SCNC: 137 MMOL/L (ref 136–145)
TROPONIN T SERPL HS-MCNC: 7 NG/L
WBC # BLD AUTO: 5.7 10E3/UL (ref 4–11)

## 2023-09-09 PROCEDURE — 20552 NJX 1/MLT TRIGGER POINT 1/2: CPT | Performed by: STUDENT IN AN ORGANIZED HEALTH CARE EDUCATION/TRAINING PROGRAM

## 2023-09-09 PROCEDURE — 71275 CT ANGIOGRAPHY CHEST: CPT

## 2023-09-09 PROCEDURE — 250N000011 HC RX IP 250 OP 636: Performed by: STUDENT IN AN ORGANIZED HEALTH CARE EDUCATION/TRAINING PROGRAM

## 2023-09-09 PROCEDURE — 250N000009 HC RX 250: Performed by: STUDENT IN AN ORGANIZED HEALTH CARE EDUCATION/TRAINING PROGRAM

## 2023-09-09 PROCEDURE — 96374 THER/PROPH/DIAG INJ IV PUSH: CPT | Mod: 59 | Performed by: STUDENT IN AN ORGANIZED HEALTH CARE EDUCATION/TRAINING PROGRAM

## 2023-09-09 PROCEDURE — 96375 TX/PRO/DX INJ NEW DRUG ADDON: CPT | Mod: 59 | Performed by: STUDENT IN AN ORGANIZED HEALTH CARE EDUCATION/TRAINING PROGRAM

## 2023-09-09 PROCEDURE — 250N000011 HC RX IP 250 OP 636: Mod: JZ | Performed by: STUDENT IN AN ORGANIZED HEALTH CARE EDUCATION/TRAINING PROGRAM

## 2023-09-09 RX ORDER — IOPAMIDOL 755 MG/ML
100 INJECTION, SOLUTION INTRAVASCULAR ONCE
Status: COMPLETED | OUTPATIENT
Start: 2023-09-09 | End: 2023-09-09

## 2023-09-09 RX ORDER — ONDANSETRON 2 MG/ML
4 INJECTION INTRAMUSCULAR; INTRAVENOUS ONCE
Status: COMPLETED | OUTPATIENT
Start: 2023-09-09 | End: 2023-09-09

## 2023-09-09 RX ORDER — IBUPROFEN 600 MG/1
600 TABLET, FILM COATED ORAL EVERY 6 HOURS PRN
Qty: 56 TABLET | Refills: 0 | Status: SHIPPED | OUTPATIENT
Start: 2023-09-09 | End: 2023-09-23

## 2023-09-09 RX ORDER — ACETAMINOPHEN 500 MG
1000 TABLET ORAL EVERY 6 HOURS PRN
Qty: 112 TABLET | Refills: 0 | Status: SHIPPED | OUTPATIENT
Start: 2023-09-09 | End: 2023-09-23

## 2023-09-09 RX ORDER — OXYCODONE HYDROCHLORIDE 5 MG/1
5 TABLET ORAL EVERY 6 HOURS PRN
Qty: 8 TABLET | Refills: 0 | Status: SHIPPED | OUTPATIENT
Start: 2023-09-09 | End: 2023-09-11

## 2023-09-09 RX ORDER — ONDANSETRON 4 MG/1
4 TABLET, ORALLY DISINTEGRATING ORAL EVERY 6 HOURS PRN
Qty: 12 TABLET | Refills: 0 | Status: SHIPPED | OUTPATIENT
Start: 2023-09-09 | End: 2023-09-12

## 2023-09-09 RX ADMIN — ONDANSETRON 4 MG: 2 INJECTION INTRAMUSCULAR; INTRAVENOUS at 01:39

## 2023-09-09 RX ADMIN — HYDROMORPHONE HYDROCHLORIDE 0.5 MG: 1 INJECTION, SOLUTION INTRAMUSCULAR; INTRAVENOUS; SUBCUTANEOUS at 00:03

## 2023-09-09 RX ADMIN — HYDROMORPHONE HYDROCHLORIDE 0.5 MG: 1 INJECTION, SOLUTION INTRAMUSCULAR; INTRAVENOUS; SUBCUTANEOUS at 01:32

## 2023-09-09 RX ADMIN — SODIUM CHLORIDE 75 ML: 9 INJECTION, SOLUTION INTRAVENOUS at 00:49

## 2023-09-09 RX ADMIN — IOPAMIDOL 56 ML: 755 INJECTION, SOLUTION INTRAVENOUS at 00:45

## 2023-09-09 ASSESSMENT — ACTIVITIES OF DAILY LIVING (ADL): ADLS_ACUITY_SCORE: 35

## 2023-09-09 NOTE — ED PROVIDER NOTES
"ED Provider Note  [facility]      History     Chief Complaint   Patient presents with    Back Pain     HPI  Cassie Morrison is a very pleasant 33-year-old woman who presents to the emergency department with a chief complaint of left upper back pain.  Started around 7 AM has been worsening.  She describes it as a sharp spasming pain.  Is worse with movement laughing and breathing.  She is not short of breath aside from the pain in the specific region when she moves.  She is not having pain in the anterior chest.  She took about 600 mg of Motrin around 4 PM without relief.  She is not having fevers or chills.  Not having productive cough.  No trauma.    Past Medical History  Past Medical History:   Diagnosis Date    Abnormal Pap smear of cervix     Repeat WNL    Allergic     Morphine    Anemia     Anxiety     Chickenpox     x2    Depression     seasonal - been dx since 14    Endometriosis     Endometriosis     dx at 14, 3 laproscopic procedures    Endometriosis     Heart murmur     IUD migration     Kidney stone     1 instance - prior to      Malignant hyperthermia due to anesthesia     Migraine     migraines - back of neck    Papanicolaou smear of cervix with low grade squamous intraepithelial lesion (LGSIL) 12    PID (pelvic inflammatory disease)     in an ER visit, \"got a shot\" - feels like it was misdiagnosed, endometriosis mis-diagnosed?    Postpartum depression     Was doing the postpartum period alone    Suicidal ideation 2015    Urinary tract infection     Varicella     twice as a child     Past Surgical History:   Procedure Laterality Date     SECTION N/A 2016    Procedure:  SECTION;  Surgeon: Reno Alamo MD;  Location: [facility] OR;  Service:     COLONOSCOPY      COLPOSCOPY      DILATION AND CURETTAGE SUCTION, TREAT INCOMPLETE       D&E    DILATION AND CURETTAGE, OPERATIVE HYSTEROSCOPY, COMBINED N/A 2015 "    D&C HSC, hysterectomy-1/31/2018    ENDOMETRIAL ABLATION      during one of the laparotomies    HC LAPAROSCOPIC IUD REMOVAL  12/24/2015    LAPAROSCOPIC HYSTERECTOMY N/A 1/31/2018    Procedure: ROBOTC TOTAL LAPAROSCOPIC HYSTERECTOMY, BILATERAL SALPINGECTOMY ;  Surgeon: Reno Alamo MD;  Location: [facility];  Service:     LAPAROSCOPY  5/2010    endometriosis    LAPAROTOMY EXPLORATORY      3x - last in 12/2015, 2010, Gyne surgeon in 2015 states no contraindication to vaginal birth per patient    LASER CO2 LAPAROSCOPIC VAPORIZATION ENDOMETRIUM N/A 12/24/2015    vaporization of endometriosis    OTHER SURGICAL HISTORY  12/24/2015    IUD removallaproscopic procedure    [place] LAP,FULGURATE/EXCISE LESIONS Right 5/5/2017    Procedure: LAPAROSCOPY, OVARIAN CYSTECTOMY;  Surgeon: Reno Alamo MD;  Location: [facility];  Service: Gynecology    [facility] ORAL SURGERY PROCEDURE      wisdom teeth x 5     acetaminophen (TYLENOL) 500 MG tablet  ibuprofen (ADVIL/MOTRIN) 600 MG tablet  ondansetron (ZOFRAN ODT) 4 MG ODT tab  oxyCODONE (ROXICODONE) 5 MG tablet  tiZANidine (ZANAFLEX) 2 MG tablet  venlafaxine (EFFEXOR-ER) 75 MG TB24 24 hr tablet      Allergies   Allergen Reactions    Blood Transfusion Related (Informational Only) Other (See Comments)     Patient has a history of a clinically significant antibody against RBC antigens.  A delay in compatible RBCs may occur.    Morphine Unknown     Nerve pain    Strawberry Extract      Family History  Family History   Problem Relation Age of Onset    Blood Disease Paternal Grandfather         anemia    Cancer Paternal Grandfather     Diabetes Paternal Grandmother     Cerebrovascular Disease Father     Cerebrovascular Disease Paternal Grandfather     Depression Mother     Alcohol/Drug Mother         meth    Alcohol/Drug Father         drugs    Arthritis Mother     Substance Abuse Mother     Mental Illness Mother     Early Death Father     Mental Illness Sister         ADHD and  anger issues    Alcoholism Maternal Aunt     Substance Abuse Maternal Aunt     Hypertension Maternal Aunt     Kidney Disease Maternal Aunt     Early Death Paternal Uncle     Cerebrovascular Disease Paternal Uncle     Heart Disease Maternal Grandmother     Hearing Loss Maternal Grandmother     Arthritis Maternal Grandmother     Dementia Maternal Grandfather     Hypertension Maternal Grandfather     Hypertension Paternal Grandmother     Arthritis Paternal Grandfather     Asthma Paternal Grandfather     Depression Paternal Grandfather     Hearing Loss Paternal Grandfather      Social History   Social History     Tobacco Use    Smoking status: Former     Packs/day: 0.10     Types: Cigarettes     Quit date: 2019     Years since quittin.3    Smokeless tobacco: Never    Tobacco comments:     smoking 10cig/day- down to 1-2 with pregnancy   Substance Use Topics    Alcohol use: Yes     Alcohol/week: 0.0 standard drinks of alcohol     Comment: 3 times a month    Drug use: No      Past medical history, past surgical history, medications, allergies, family history, and social history were reviewed with the patient. No additional pertinent items.      A medically appropriate review of systems was performed with pertinent positives and negatives noted in the HPI, and all other systems negative.    Physical Exam   BP: 114/76  Pulse: 93  Temp: 98.2  F (36.8  C)  Resp: 16  Weight: 75.8 kg (167 lb 3.2 oz)  SpO2: 99 %  Physical Exam  Vital Signs Reviewed  Gen: Well nourished, well developed, resting comfortably, no acute distress  HEENT: NC/AT, PERRL, EOMI, MMM  Neck: Supple, FROM  CV: Regular Rate, no murmur/rub/gallop  Lungs/Chest: Normal Effort, CTAB  Abd: Non-distended, non-tender  MSK/Back: FROM, no visible deformity.  Exquisite tenderness in the left upper back between spinal processes and scapula.  Palpable muscle spasm.  Neuro: A&Ox3, GCS 15, CN II-XII unremarkable  Skin: Warm, Dry, Intact, no visible lesions    ED  Course, Procedures, & Data     ED Course as of 09/09/23 0239   Fri Sep 08, 2023   2223 Trigger point injection performed   2313 Patient's superficial pain is improved but she still is having a deep pleuritic pain.  Although suspicion initially for significant cardiopulmonary things were low and expanded work-up was undertaken   Troponin negative given duration of symptoms ACS unlikely  D-dimer slightly positive, PE study performed and negative  Patient feeling better after receiving IV Dilaudid for breakthrough pain control  No acute process on CT or x-ray to explain symptoms, likely musculoskeletal  Patient feels comfortable discharging with oral medications, primary care follow-up and return precautions    [facility]    Procedure: Trigger Point Injection    Date/Time: 9/9/2023 2:41 AM    Performed by: Wolf Sanders MD  Authorized by: Wolf Sanders MD    Risks, benefits and alternatives discussed.       ANESTHESIA    Anesthesia:  See MAR for details  Anesthetic Total (mL):  3      PROCEDURE  Describe Procedure: Area of point tenderness was identified in the left upper back.  Skin charlotte was made.  The skin was cleaned with alcohol wipes and allowed to dry completely.  A 27-gauge needle was inserted into the subcutaneous and muscular tissue and approximately 3 cc of Marcaine was administered in a fanning pattern.  This was then massaged into the tissue and the injection site was bandaged.  Patient Tolerance:  Patient tolerated the procedure well with no immediate complications                       Results for orders placed or performed during the hospital encounter of 09/08/23   XR Chest 2 Views     Status: None    Narrative    EXAM: XR CHEST 2 VIEWS  LOCATION: [facility]  DATE: 9/8/2023    INDICATION: Left chest and upper back pleuritic pain.  COMPARISON: 03/06/2020      Impression    IMPRESSION: Negative chest.    CT Chest Pulmonary Embolism w Contrast     Status: None    Narrative    EXAM: CT CHEST PULMONARY EMBOLISM W CONTRAST  LOCATION: [facility]  DATE: 9/9/2023    INDICATION: Pleuritic left upper back pain, + dimer  COMPARISON: Chest radiographs 09/08/2023. CT abdomen 11/01/2020.  TECHNIQUE: CT chest pulmonary angiogram during arterial phase injection of IV contrast. Multiplanar reformats and MIP reconstructions were performed. Dose reduction techniques were used.   CONTRAST: 56mL Isovue 370    FINDINGS:  ANGIOGRAM CHEST: No pulmonary embolus. No aortic aneurysm.    LUNGS AND PLEURA: Tiny benign calcified granuloma in the left lower lobe. Lungs otherwise clear. No pleural fluid or pneumothorax.    MEDIASTINUM/AXILLAE: Normal.    CORONARY ARTERY CALCIFICATION: None.    UPPER ABDOMEN: A few tiny benign hepatic cysts.    MUSCULOSKELETAL: Normal.      Impression    IMPRESSION:  No pulmonary embolus or other acute process in the chest.   INR     Status: Normal   Result Value Ref Range    INR 1.03 0.85 - 1.15   D dimer quantitative     Status: Abnormal   Result Value Ref Range    D-Dimer Quantitative 0.56 (H) 0.00 - 0.50 ug/mL FEU    Narrative    This D-dimer assay is intended for use in conjunction with a clinical pretest probability assessment model to exclude pulmonary embolism (PE) and deep venous thrombosis (DVT) in outpatients suspected of PE or DVT. The cut-off value is 0.50 ug/mL FEU.   Comprehensive metabolic panel     Status: Abnormal   Result Value Ref Range    Sodium 137 136 - 145 mmol/L    Potassium 3.5 3.4 - 5.3 mmol/L    Chloride 104 98 - 107 mmol/L    Carbon Dioxide (CO2) 24 22 - 29 mmol/L    Anion Gap 9 7 - 15 mmol/L    Urea Nitrogen 13.9 6.0 - 20.0 mg/dL    Creatinine 0.69 0.51 - 0.95 mg/dL    Calcium 9.4 8.6 - 10.0 mg/dL    Glucose 106 (H) 70 - 99 mg/dL    Alkaline Phosphatase 44 35 - 104 U/L    AST 22 0 - 45 U/L    ALT 24 0 - 50 U/L    Protein Total 7.6 6.4 -  8.3 g/dL    Albumin 3.9 3.5 - 5.2 g/dL    Bilirubin Total 0.3 <=1.2 mg/dL    GFR Estimate >90 >60 mL/min/1.73m2   Troponin T, High Sensitivity     Status: Normal   Result Value Ref Range    Troponin T, High Sensitivity 7 <=14 ng/L   CBC with platelets and differential     Status: Abnormal   Result Value Ref Range    WBC Count 5.7 4.0 - 11.0 10e3/uL    RBC Count 3.72 (L) 3.80 - 5.20 10e6/uL    Hemoglobin 11.9 11.7 - 15.7 g/dL    Hematocrit 35.1 35.0 - 47.0 %    MCV 94 78 - 100 fL    MCH 32.0 26.5 - 33.0 pg    MCHC 33.9 31.5 - 36.5 g/dL    RDW 12.8 10.0 - 15.0 %    Platelet Count 212 150 - 450 10e3/uL    % Neutrophils 61 %    % Lymphocytes 30 %    % Monocytes 8 %    % Eosinophils 1 %    % Basophils 0 %    % Immature Granulocytes 0 %    NRBCs per 100 WBC 0 <1 /100    Absolute Neutrophils 3.5 1.6 - 8.3 10e3/uL    Absolute Lymphocytes 1.7 0.8 - 5.3 10e3/uL    Absolute Monocytes 0.4 0.0 - 1.3 10e3/uL    Absolute Eosinophils 0.1 0.0 - 0.7 10e3/uL    Absolute Basophils 0.0 0.0 - 0.2 10e3/uL    Absolute Immature Granulocytes 0.0 <=0.4 10e3/uL    Absolute NRBCs 0.0 10e3/uL   CBC with platelets differential     Status: Abnormal    Narrative    The following orders were created for panel order CBC with platelets differential.  Procedure                               Abnormality         Status                     ---------                               -----------         ------                     CBC with platelets and d...[[id number]]  Abnormal            Final result                 Please view results for these tests on the individual orders.     Medications   HYDROmorphone (PF) (DILAUDID) injection 0.5 mg (0.5 mg Intravenous $Given 9/9/23 0132)   acetaminophen (TYLENOL) tablet 1,000 mg (1,000 mg Oral $Given 9/8/23 2229)   ibuprofen (ADVIL/MOTRIN) tablet 600 mg (600 mg Oral $Given 9/8/23 2229)   diazepam (VALIUM) tablet 10 mg (10 mg Oral $Given 9/8/23 2229)   BUPivacaine (MARCAINE)  0.5% 30ml vial (25 mg Intradermal $Given  by Other Clinician 9/8/23 3300)   sodium chloride 0.9 % bag 100 mL for CT (75 mLs Intravenous $Given 9/9/23 0049)   iopamidol (ISOVUE-370) solution 100 mL (56 mLs Intravenous $Given 9/9/23 0045)   ondansetron (ZOFRAN) injection 4 mg (4 mg Intravenous $Given 9/9/23 0139)     Labs Ordered and Resulted from Time of ED Arrival to Time of ED Departure   D DIMER QUANTITATIVE - Abnormal       Result Value    D-Dimer Quantitative 0.56 (*)    COMPREHENSIVE METABOLIC PANEL - Abnormal    Sodium 137      Potassium 3.5      Chloride 104      Carbon Dioxide (CO2) 24      Anion Gap 9      Urea Nitrogen 13.9      Creatinine 0.69      Calcium 9.4      Glucose 106 (*)     Alkaline Phosphatase 44      AST 22      ALT 24      Protein Total 7.6      Albumin 3.9      Bilirubin Total 0.3      GFR Estimate >90     CBC WITH PLATELETS AND DIFFERENTIAL - Abnormal    WBC Count 5.7      RBC Count 3.72 (*)     Hemoglobin 11.9      Hematocrit 35.1      MCV 94      MCH 32.0      MCHC 33.9      RDW 12.8      Platelet Count 212      % Neutrophils 61      % Lymphocytes 30      % Monocytes 8      % Eosinophils 1      % Basophils 0      % Immature Granulocytes 0      NRBCs per 100 WBC 0      Absolute Neutrophils 3.5      Absolute Lymphocytes 1.7      Absolute Monocytes 0.4      Absolute Eosinophils 0.1      Absolute Basophils 0.0      Absolute Immature Granulocytes 0.0      Absolute NRBCs 0.0     INR - Normal    INR 1.03     TROPONIN T, HIGH SENSITIVITY - Normal    Troponin T, High Sensitivity 7       CT Chest Pulmonary Embolism w Contrast   Final Result   IMPRESSION:   No pulmonary embolus or other acute process in the chest.      XR Chest 2 Views   Final Result   IMPRESSION: Negative chest.             Critical care was not performed.     Medical Decision Making  The patient's presentation was of high complexity (an acute health issue posing potential threat to life or bodily function).    The patient's evaluation involved:  ordering and/or review of  3+ test(s) in this encounter (see separate area of note for details)    The patient's management necessitated high risk (a parenteral controlled substance).    Assessment & Plan    Cassie Morrison is a very pleasant 33-year-old woman who presents to the emergency department with a chief complaint of left upper back pain.  On arrival the patient had reassuring vital signs.  Her cardiopulmonary exam was reassuring with good breath sounds throughout.  No history of trauma.  Given the exquisite tenderness in the trapezius musculature treated initially with Tylenol Motrin and Valium for spasm.  Also performed a trigger point injection.  The superficial symptoms and pain dramatically improved after this however there still was some deep pleuritic pain.  Her vital signs and risk factors make PE unlikely but she was quite anxious about this so we decided upon an expanded work-up.  Her labs showed no leukocytosis, no anemia and normal platelet count.  Her x-ray did not suggest any acute process.  High-sensitivity troponin was negative at 7.  Given the duration of her symptoms ACS is essentially excluded at this time.  Her metabolic panel was reassuring.  D-dimer was slightly elevated at 0.56.  PE study did not show PE or other acute process in the chest or thorax.  She received IV Dilaudid for breakthrough pain control and was quite improved after this.    We a long discussion about symptom causes being likely musculoskeletal possible pleuritic irritation but that there did not appear to be an obviously life-threatening cause of her symptoms.  She was reassured by this and her symptoms have improved to the point where she was comfortable discharging home with oral medications and following up with primary care.  Return precautions for worsening pain or new symptoms were provided to the patient.  Prescriptions were sent to the pharmacy of her choice.  She ultimately discharged home in good spirits.    I have reviewed the  nursing notes. I have reviewed the findings, diagnosis, plan and need for follow up with the patient.    Discharge Medication List as of 9/9/2023  1:51 AM        START taking these medications    Details   acetaminophen (TYLENOL) 500 MG tablet Take 2 tablets (1,000 mg) by mouth every 6 hours as needed for pain or fever, Disp-112 tablet, R-0, E-Prescribe      ondansetron (ZOFRAN ODT) 4 MG ODT tab Take 1 tablet (4 mg) by mouth every 6 hours as needed, Disp-12 tablet, R-0, E-Prescribe      oxyCODONE (ROXICODONE) 5 MG tablet Take 1 tablet (5 mg) by mouth every 6 hours as needed for breakthrough pain, Disp-8 tablet, R-0, E-Prescribe             Final diagnoses:   Upper back pain on left side       Wolf Sanders Jr., MD   [facility] EMERGENCY DEPARTMENT  9/8/2023     Wolf Sanders MD  09/09/23 0245

## 2023-09-09 NOTE — ED NOTES
Patient Verbalized understanding of discharge instructions including medication administration and recommended follow up care as noted on discharge instructions. Written discharge instructions given, denies any further questions. Prescriptions sent to pharmacy of choice

## 2023-09-09 NOTE — ED NOTES
"Patient presents with c/o mid upper back pain and states pain started at 7am and has gotten worse and rates pain as \"8-9\" on pain scale. Per patient, pain increased to sharp, stabbing sensation with movement, laughing and breathing. States she has been taking Ibuprofen with last dose at 4pm without relief. Awake, calm and sitting in hood with friend.  "

## 2023-09-17 ENCOUNTER — HEALTH MAINTENANCE LETTER (OUTPATIENT)
Age: 33
End: 2023-09-17

## 2023-11-02 ENCOUNTER — TELEPHONE (OUTPATIENT)
Dept: BEHAVIORAL HEALTH | Facility: CLINIC | Age: 33
End: 2023-11-02

## 2023-11-02 ENCOUNTER — HOSPITAL ENCOUNTER (INPATIENT)
Facility: CLINIC | Age: 33
LOS: 3 days | Discharge: HOME | DRG: 885 | End: 2023-11-07
Attending: FAMILY MEDICINE | Admitting: PSYCHIATRY & NEUROLOGY

## 2023-11-02 DIAGNOSIS — F31.81 BIPOLAR 2 DISORDER, MAJOR DEPRESSIVE EPISODE (H): Primary | ICD-10-CM

## 2023-11-02 DIAGNOSIS — F32.A DEPRESSION, UNSPECIFIED DEPRESSION TYPE: ICD-10-CM

## 2023-11-02 DIAGNOSIS — Z11.52 ENCOUNTER FOR SCREENING LABORATORY TESTING FOR SEVERE ACUTE RESPIRATORY SYNDROME CORONAVIRUS 2 (SARS-COV-2): ICD-10-CM

## 2023-11-02 DIAGNOSIS — R45.851 SUICIDAL IDEATION: ICD-10-CM

## 2023-11-02 LAB
ALBUMIN SERPL BCG-MCNC: 4 G/DL (ref 3.5–5.2)
ALP SERPL-CCNC: 40 U/L (ref 35–104)
ALT SERPL W P-5'-P-CCNC: 22 U/L (ref 0–50)
AMPHETAMINES UR QL SCN: ABNORMAL
ANION GAP SERPL CALCULATED.3IONS-SCNC: 7 MMOL/L (ref 7–15)
AST SERPL W P-5'-P-CCNC: 23 U/L (ref 0–45)
BARBITURATES UR QL SCN: ABNORMAL
BASOPHILS # BLD AUTO: 0 10E3/UL (ref 0–0.2)
BASOPHILS NFR BLD AUTO: 0 %
BENZODIAZ UR QL SCN: ABNORMAL
BILIRUB SERPL-MCNC: 0.3 MG/DL
BUN SERPL-MCNC: 12.8 MG/DL (ref 6–20)
BZE UR QL SCN: ABNORMAL
CALCIUM SERPL-MCNC: 9.4 MG/DL (ref 8.6–10)
CANNABINOIDS UR QL SCN: ABNORMAL
CHLORIDE SERPL-SCNC: 106 MMOL/L (ref 98–107)
CREAT SERPL-MCNC: 0.71 MG/DL (ref 0.51–0.95)
DEPRECATED HCO3 PLAS-SCNC: 26 MMOL/L (ref 22–29)
EGFRCR SERPLBLD CKD-EPI 2021: >90 ML/MIN/1.73M2
EOSINOPHIL # BLD AUTO: 0.1 10E3/UL (ref 0–0.7)
EOSINOPHIL NFR BLD AUTO: 2 %
ERYTHROCYTE [DISTWIDTH] IN BLOOD BY AUTOMATED COUNT: 12.9 % (ref 10–15)
FENTANYL UR QL: ABNORMAL
GLUCOSE SERPL-MCNC: 88 MG/DL (ref 70–99)
HCG UR QL: NEGATIVE
HCT VFR BLD AUTO: 35.8 % (ref 35–47)
HGB BLD-MCNC: 12 G/DL (ref 11.7–15.7)
HOLD SPECIMEN: NORMAL
HOLD SPECIMEN: NORMAL
IMM GRANULOCYTES # BLD: 0 10E3/UL
IMM GRANULOCYTES NFR BLD: 0 %
LYMPHOCYTES # BLD AUTO: 1.4 10E3/UL (ref 0.8–5.3)
LYMPHOCYTES NFR BLD AUTO: 36 %
MCH RBC QN AUTO: 31.2 PG (ref 26.5–33)
MCHC RBC AUTO-ENTMCNC: 33.5 G/DL (ref 31.5–36.5)
MCV RBC AUTO: 93 FL (ref 78–100)
MONOCYTES # BLD AUTO: 0.3 10E3/UL (ref 0–1.3)
MONOCYTES NFR BLD AUTO: 8 %
NEUTROPHILS # BLD AUTO: 2.1 10E3/UL (ref 1.6–8.3)
NEUTROPHILS NFR BLD AUTO: 54 %
NRBC # BLD AUTO: 0 10E3/UL
NRBC BLD AUTO-RTO: 0 /100
OPIATES UR QL SCN: ABNORMAL
PCP QUAL URINE (ROCHE): ABNORMAL
PLATELET # BLD AUTO: 222 10E3/UL (ref 150–450)
POTASSIUM SERPL-SCNC: 3.7 MMOL/L (ref 3.4–5.3)
PROT SERPL-MCNC: 7.5 G/DL (ref 6.4–8.3)
RBC # BLD AUTO: 3.85 10E6/UL (ref 3.8–5.2)
SODIUM SERPL-SCNC: 139 MMOL/L (ref 135–145)
TSH SERPL DL<=0.005 MIU/L-ACNC: 2.13 UIU/ML (ref 0.3–4.2)
WBC # BLD AUTO: 3.9 10E3/UL (ref 4–11)

## 2023-11-02 PROCEDURE — 81025 URINE PREGNANCY TEST: CPT | Performed by: FAMILY MEDICINE

## 2023-11-02 PROCEDURE — 99285 EMERGENCY DEPT VISIT HI MDM: CPT | Mod: 25 | Performed by: FAMILY MEDICINE

## 2023-11-02 PROCEDURE — 36415 COLL VENOUS BLD VENIPUNCTURE: CPT | Performed by: FAMILY MEDICINE

## 2023-11-02 PROCEDURE — 84443 ASSAY THYROID STIM HORMONE: CPT | Performed by: FAMILY MEDICINE

## 2023-11-02 PROCEDURE — 85025 COMPLETE CBC W/AUTO DIFF WBC: CPT | Performed by: FAMILY MEDICINE

## 2023-11-02 PROCEDURE — 80307 DRUG TEST PRSMV CHEM ANLYZR: CPT | Performed by: FAMILY MEDICINE

## 2023-11-02 PROCEDURE — 99285 EMERGENCY DEPT VISIT HI MDM: CPT | Performed by: FAMILY MEDICINE

## 2023-11-02 PROCEDURE — 80053 COMPREHEN METABOLIC PANEL: CPT | Performed by: FAMILY MEDICINE

## 2023-11-02 PROCEDURE — 83735 ASSAY OF MAGNESIUM: CPT | Performed by: EMERGENCY MEDICINE

## 2023-11-02 RX ORDER — FERROUS GLUCONATE 324(37.5)
1 TABLET ORAL DAILY
COMMUNITY

## 2023-11-02 RX ORDER — ACETAMINOPHEN 325 MG/1
325-650 TABLET ORAL EVERY 6 HOURS PRN
COMMUNITY
End: 2024-08-22

## 2023-11-02 RX ORDER — OMEPRAZOLE 40 MG/1
40 CAPSULE, DELAYED RELEASE ORAL
COMMUNITY
Start: 2023-05-16 | End: 2023-11-02

## 2023-11-02 RX ORDER — SUMATRIPTAN 50 MG/1
50 TABLET, FILM COATED ORAL
COMMUNITY
Start: 2022-08-22 | End: 2023-11-02

## 2023-11-02 RX ORDER — MULTIPLE VITAMINS W/ MINERALS TAB 9MG-400MCG
1 TAB ORAL DAILY
COMMUNITY
End: 2024-08-22

## 2023-11-02 RX ORDER — PROPRANOLOL HCL 60 MG
60 CAPSULE, EXTENDED RELEASE 24HR ORAL DAILY
COMMUNITY
End: 2024-08-22

## 2023-11-02 ASSESSMENT — ACTIVITIES OF DAILY LIVING (ADL)
ADLS_ACUITY_SCORE: 35
ADLS_ACUITY_SCORE: 33
ADLS_ACUITY_SCORE: 35
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
## 2023-11-02 NOTE — ED TRIAGE NOTES
Patient reports she is here because she is suicidal. Patient reports her plan is to drive her car off the road. Patient does not wish to elaborate further.

## 2023-11-02 NOTE — PHARMACY-ADMISSION MEDICATION HISTORY
Admission Medication History Completed by Pharmacy    See [facility] Admission Navigator for allergy information, preferred outpatient pharmacy, prior to admission medications and immunization status.     Medication history sources:  Patient; Chart Review     Pertinent changes made to PTA medication list:  Added: All PTA medications added to list   Deleted:   - Omeprazole 40 mg capsules  - Sumatriptan 50 mg tablets   - Tizanidine 2 mg tablets   - Venlafaxine ER 75 mg tablets   Changed: N/A     Additional medication history information:   - Patient denies taking any additional Rx/OTC medications other than the ones listed below.    Prior to Admission medications    Medication Sig Last Dose Taking? Auth Provider Long Term End Date   acetaminophen (TYLENOL) 325 MG tablet Take 325-650 mg by mouth every 6 hours as needed for mild pain Past Month Yes Unknown, Entered By History     Ferrous Gluconate 324 (37.5 Fe) MG TABS Take 1 tablet by mouth daily 11/2/2023 Yes Unknown, Entered By History     multivitamin w/minerals (THERA-VIT-M) tablet Take 1 tablet by mouth daily 11/2/2023 Yes Unknown, Entered By History     propranolol ER (INDERAL LA) 60 MG 24 hr capsule Take 60 mg by mouth daily 11/2/2023 Yes Unknown, Entered By History Yes          Date completed: 11/02/23    Medication history completed by:   Summer Foster, PharmD  *[id number]

## 2023-11-03 ENCOUNTER — TELEPHONE (OUTPATIENT)
Dept: BEHAVIORAL HEALTH | Facility: CLINIC | Age: 33
End: 2023-11-03

## 2023-11-03 LAB
ATRIAL RATE - MUSE: 47 BPM
DIASTOLIC BLOOD PRESSURE - MUSE: NORMAL MMHG
INTERPRETATION ECG - MUSE: NORMAL
MAGNESIUM SERPL-MCNC: 1.9 MG/DL (ref 1.7–2.3)
P AXIS - MUSE: 36 DEGREES
PR INTERVAL - MUSE: 156 MS
QRS DURATION - MUSE: 86 MS
QT - MUSE: 444 MS
QTC - MUSE: 392 MS
R AXIS - MUSE: 57 DEGREES
SARS-COV-2 RNA RESP QL NAA+PROBE: NEGATIVE
SYSTOLIC BLOOD PRESSURE - MUSE: NORMAL MMHG
T AXIS - MUSE: 43 DEGREES
VENTRICULAR RATE- MUSE: 47 BPM

## 2023-11-03 PROCEDURE — 99417 PROLNG OP E/M EACH 15 MIN: CPT

## 2023-11-03 PROCEDURE — 250N000013 HC RX MED GY IP 250 OP 250 PS 637: Performed by: EMERGENCY MEDICINE

## 2023-11-03 PROCEDURE — 99245 OFF/OP CONSLTJ NEW/EST HI 55: CPT

## 2023-11-03 PROCEDURE — 87635 SARS-COV-2 COVID-19 AMP PRB: CPT | Performed by: EMERGENCY MEDICINE

## 2023-11-03 RX ORDER — LORAZEPAM 1 MG/1
1 TABLET ORAL 2 TIMES DAILY PRN
Status: DISCONTINUED | OUTPATIENT
Start: 2023-11-03 | End: 2023-11-07 | Stop reason: HOSPADM

## 2023-11-03 RX ORDER — PROPRANOLOL HCL 60 MG
60 CAPSULE, EXTENDED RELEASE 24HR ORAL DAILY
Status: DISCONTINUED | OUTPATIENT
Start: 2023-11-03 | End: 2023-11-07 | Stop reason: HOSPADM

## 2023-11-03 RX ORDER — ACETAMINOPHEN 325 MG/1
650 TABLET ORAL EVERY 4 HOURS PRN
Status: DISCONTINUED | OUTPATIENT
Start: 2023-11-03 | End: 2023-11-04

## 2023-11-03 RX ADMIN — PROPRANOLOL HYDROCHLORIDE 60 MG: 60 CAPSULE, EXTENDED RELEASE ORAL at 11:46

## 2023-11-03 RX ADMIN — ACETAMINOPHEN 650 MG: 325 TABLET, FILM COATED ORAL at 11:46

## 2023-11-03 RX ADMIN — ACETAMINOPHEN 650 MG: 325 TABLET, FILM COATED ORAL at 19:31

## 2023-11-03 ASSESSMENT — ACTIVITIES OF DAILY LIVING (ADL)
ADLS_ACUITY_SCORE: 35
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
## 2023-11-03 NOTE — ED NOTES
IP MH Referral Acuity Rating Score (RARS)    LMHP complete at referral to IP MH, with DEC; and, daily while awaiting IP MH placement. Call score to PPS.  CRITERIA SCORING   New 70 HH and Involuntary for IP MH (not adolescent) 0/1   Boarding over 24 hours 1/1   Vulnerable adult at least 55+ with multiple co morbidities; or, Patient age 11 or under 0/1   Suicide ideation without relief of precipitating factors 1/1   Current plan for suicide 1/1   Current plan for homicide 0/1   Imminent risk or actual attempt to seriously harm another without relief of factors precipitating the attempt 0/1   Severe dysfunction in daily living (ex: complete neglect for self care, extreme disruption in vegetative function, extreme deterioration in social interactions) 1/1   Recent (last 2 weeks) or current physical aggression in the ED 0/1   Restraints or seclusion episode in ED 0/1   Verbal aggression, agitation, yelling, etc., while in the ED 0/1   Active psychosis with psychomotor agitation or catatonia 0/1   Need for constant or near constant redirection (from leaving, from others, etc).  0/1   Intrusive or disruptive behaviors 0/1   TOTAL Acuity Total Score: 4       EVELYN Bellamy

## 2023-11-03 NOTE — TELEPHONE ENCOUNTER
[place]: [place]  Access Inpatient Bed Call Log 11/3/2023 @ 4:29 PM     [facility] has called facilities that have not updated the bed status within the last 12 hours.                             [place] [facility] [place]     5:31 PM  [facility] Reviewing.   5:38 PM Intake called ED RN to request COVID     6:47 PM faxed [facility] 2 page form     7:25 PM [facility] called, only 1 page was received. Requesting both pages be re-faxed.    7:26 PM intake re-faxed     7:55 PM [facility] called again, only 1 page received.   7:56 PM Intake re faxed without cover sheet    8:17 PM [facility] called requesting labs and notes from 11/3 they are unable to find them in the system.  8:17 PM Intake faxed all labs and notes from 11/3  9:34 PM re faxed [facility] 2 form with correct bday.   11:10PM [facility] called - Provider is reviewing and wants to know if pt is willing to travel to [place]  11:17 PM [facility] called ED to see if pt is willing to travel to [place] - RN states pt is ok with traveling to [place].   11:19 PM Intake called [facility] advising pt is willing to travel. [facility] will call back if /when accepted.     [facility] is posting 0 beds.          [facility] is posting 0 beds. [phone].   [facility] is posting 0 beds. Negative covid required.                [facility] is posting 0 bed. Negative covid required. [phone]  [place] is posting 0 bed. [phone]  [facility] is posting 0 beds. [phone] is posting 1 bed for Young Adults age 18-26. Pt not age appropriate. Call for details. Negative covid.  [phone].  [facility] is posting 0 beds          [facility] ([facility]) is posting 0 beds.    Health system ([place]) is posting 2 beds. Low acuity only. Negative covid.  [phone].

## 2023-11-03 NOTE — TELEPHONE ENCOUNTER
R: [place]  Access Inpatient Bed Call Log 11/2/2023 11:17 PM     Intake has called facilities that have not updated the bed status within the last 12 hours.                             [facility] is posting 0 beds.          [facility] is posting 0 beds. [phone].   [facility] is posting 0 beds. Negative covid required.                [facility] is posting 0 bed. Negative covid required. [phone]  [place] is posting 0 bed. [phone]  [facility] is posting 0 beds. [phone].   [facility] is posting 1 bed for Young Adults age 18-26. Call for details. Negative covid.  [phone]. Pt not age appropriate  [facility] is posting 0 beds          [facility] is posting 0 beds.  [facility] ([facility] is posting 0 beds. Low acuity only. Negative covid.  [phone].       Pt remains on the work list pending appropriate bed availability.

## 2023-11-03 NOTE — TELEPHONE ENCOUNTER
[place]: [place]  Access Inpatient Bed Call Log  11/2/2023 11:49 PM  Intake has called facilities that have not updated their bed status within the last 12 hours.??      [facility] [place]     *[facility]: @ cap per website.  [place] -- [facility]:  @ cap per website.  [place] -- [facility]: @cap per website  [place] -- [facility]: @ cap per website.  [place] -- [facility]: @cap per website.  [facility] -- [facility]: @ cap per website.  [facility] Gait[facility] -- [facility]/ beds Posting 1 Bed. Pt does not meet age requirement  [place] -- [facility]: @ cap per website.  [place] -- [facility]: @ cap per website.  [place] -- [facility]: @ cap per website.  [facility]:  @ cap per website. COVID negative test req. Lower acuity only  [facility]: @ cap per website. Low acuity only. Prefer med adjustments placement.  [place] [facility]: @ cap per website. No aggression    Pt remains on waitlist pending appropriate placement availability
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
## 2023-11-04 ENCOUNTER — TELEPHONE (OUTPATIENT)
Dept: BEHAVIORAL HEALTH | Facility: CLINIC | Age: 33
End: 2023-11-04

## 2023-11-04 PROBLEM — F31.81 BIPOLAR 2 DISORDER, MAJOR DEPRESSIVE EPISODE (H): Status: ACTIVE | Noted: 2023-11-04

## 2023-11-04 PROBLEM — F32.A DEPRESSION, UNSPECIFIED DEPRESSION TYPE: Status: ACTIVE | Noted: 2023-11-04

## 2023-11-04 PROCEDURE — 99223 1ST HOSP IP/OBS HIGH 75: CPT | Mod: AI | Performed by: PSYCHIATRY & NEUROLOGY

## 2023-11-04 PROCEDURE — 250N000013 HC RX MED GY IP 250 OP 250 PS 637: Performed by: EMERGENCY MEDICINE

## 2023-11-04 PROCEDURE — 124N000002 HC R&B MH UMMC

## 2023-11-04 PROCEDURE — 250N000013 HC RX MED GY IP 250 OP 250 PS 637: Performed by: PSYCHIATRY & NEUROLOGY

## 2023-11-04 RX ORDER — LANOLIN ALCOHOL/MO/W.PET/CERES
3 CREAM (GRAM) TOPICAL
Status: DISCONTINUED | OUTPATIENT
Start: 2023-11-04 | End: 2023-11-07 | Stop reason: HOSPADM

## 2023-11-04 RX ORDER — MAGNESIUM HYDROXIDE/ALUMINUM HYDROXICE/SIMETHICONE 120; 1200; 1200 MG/30ML; MG/30ML; MG/30ML
30 SUSPENSION ORAL EVERY 4 HOURS PRN
Status: DISCONTINUED | OUTPATIENT
Start: 2023-11-04 | End: 2023-11-07 | Stop reason: HOSPADM

## 2023-11-04 RX ORDER — SUMATRIPTAN 25 MG/1
25 TABLET, FILM COATED ORAL EVERY 8 HOURS PRN
Status: DISCONTINUED | OUTPATIENT
Start: 2023-11-04 | End: 2023-11-07 | Stop reason: HOSPADM

## 2023-11-04 RX ORDER — POLYETHYLENE GLYCOL 3350 17 G/17G
17 POWDER, FOR SOLUTION ORAL DAILY PRN
Status: DISCONTINUED | OUTPATIENT
Start: 2023-11-04 | End: 2023-11-07 | Stop reason: HOSPADM

## 2023-11-04 RX ORDER — FERROUS GLUCONATE 324(38)MG
324 TABLET ORAL DAILY
Status: DISCONTINUED | OUTPATIENT
Start: 2023-11-04 | End: 2023-11-07 | Stop reason: HOSPADM

## 2023-11-04 RX ORDER — ACETAMINOPHEN 325 MG/1
650 TABLET ORAL EVERY 4 HOURS PRN
Status: DISCONTINUED | OUTPATIENT
Start: 2023-11-04 | End: 2023-11-07 | Stop reason: HOSPADM

## 2023-11-04 RX ORDER — MULTIPLE VITAMINS W/ MINERALS TAB 9MG-400MCG
1 TAB ORAL DAILY
Status: DISCONTINUED | OUTPATIENT
Start: 2023-11-04 | End: 2023-11-07 | Stop reason: HOSPADM

## 2023-11-04 RX ORDER — OLANZAPINE 10 MG/2ML
10 INJECTION, POWDER, FOR SOLUTION INTRAMUSCULAR 3 TIMES DAILY PRN
Status: DISCONTINUED | OUTPATIENT
Start: 2023-11-04 | End: 2023-11-07 | Stop reason: HOSPADM

## 2023-11-04 RX ORDER — OLANZAPINE 10 MG/1
10 TABLET ORAL 3 TIMES DAILY PRN
Status: DISCONTINUED | OUTPATIENT
Start: 2023-11-04 | End: 2023-11-07 | Stop reason: HOSPADM

## 2023-11-04 RX ORDER — HYDROXYZINE HYDROCHLORIDE 25 MG/1
25 TABLET, FILM COATED ORAL EVERY 4 HOURS PRN
Status: DISCONTINUED | OUTPATIENT
Start: 2023-11-04 | End: 2023-11-07 | Stop reason: HOSPADM

## 2023-11-04 RX ORDER — QUETIAPINE FUMARATE 50 MG/1
50-100 TABLET, FILM COATED ORAL 4 TIMES DAILY PRN
Status: DISCONTINUED | OUTPATIENT
Start: 2023-11-04 | End: 2023-11-07 | Stop reason: HOSPADM

## 2023-11-04 RX ORDER — DIVALPROEX SODIUM 500 MG/1
20 TABLET, DELAYED RELEASE ORAL AT BEDTIME
Status: DISCONTINUED | OUTPATIENT
Start: 2023-11-04 | End: 2023-11-07 | Stop reason: HOSPADM

## 2023-11-04 RX ADMIN — PROPRANOLOL HYDROCHLORIDE 60 MG: 60 CAPSULE, EXTENDED RELEASE ORAL at 08:24

## 2023-11-04 RX ADMIN — ACETAMINOPHEN 650 MG: 325 TABLET, FILM COATED ORAL at 08:25

## 2023-11-04 RX ADMIN — QUETIAPINE FUMARATE 50 MG: 50 TABLET ORAL at 17:30

## 2023-11-04 RX ADMIN — ACETAMINOPHEN 650 MG: 325 TABLET, FILM COATED ORAL at 19:38

## 2023-11-04 RX ADMIN — FERROUS GLUCONATE 324 MG: 324 TABLET ORAL at 12:37

## 2023-11-04 RX ADMIN — DIVALPROEX SODIUM 1500 MG: 500 TABLET, DELAYED RELEASE ORAL at 21:11

## 2023-11-04 RX ADMIN — LORAZEPAM 1 MG: 1 TABLET ORAL at 08:23

## 2023-11-04 RX ADMIN — LORAZEPAM 1 MG: 1 TABLET ORAL at 19:38

## 2023-11-04 RX ADMIN — MULTIPLE VITAMINS W/ MINERALS TAB 1 TABLET: TAB at 12:37

## 2023-11-04 ASSESSMENT — ACTIVITIES OF DAILY LIVING (ADL)
ADLS_ACUITY_SCORE: 20
ADLS_ACUITY_SCORE: 20
ADLS_ACUITY_SCORE: 35
DRESSING/BATHING_DIFFICULTY: NO
ADLS_ACUITY_SCORE: 20
ADLS_ACUITY_SCORE: 35
ADLS_ACUITY_SCORE: 35
CHANGE_IN_FUNCTIONAL_STATUS_SINCE_ONSET_OF_CURRENT_ILLNESS/INJURY: NO
DIFFICULTY_COMMUNICATING: NO
CONCENTRATING,_REMEMBERING_OR_MAKING_DECISIONS_DIFFICULTY: NO
WALKING_OR_CLIMBING_STAIRS_DIFFICULTY: NO
ADLS_ACUITY_SCORE: 20
FALL_HISTORY_WITHIN_LAST_SIX_MONTHS: NO
TOILETING_ISSUES: NO
ADLS_ACUITY_SCORE: 20
VISION_MANAGEMENT: WEARS GLASSES
ADLS_ACUITY_SCORE: 35
ADLS_ACUITY_SCORE: 35
ADLS_ACUITY_SCORE: 20
WEAR_GLASSES_OR_BLIND: YES
HEARING_DIFFICULTY_OR_DEAF: NO
DOING_ERRANDS_INDEPENDENTLY_DIFFICULTY: NO
ADLS_ACUITY_SCORE: 35
DIFFICULTY_EATING/SWALLOWING: NO

## 2023-11-04 NOTE — CONSULTS
Name:  Cassie Morrison   : 1990  Date:   2023  Location of patient: [facility]    Location of doctor:  [place]  Length of consult:  10 minutes phone consult    Discussed with Staff:  Stacy     Assessment:  33 year old female with suicidal ideations who is recommended for the inpatient mental health unit.    Medically cleared by the ED.    Plan:   Voluntary for inpatient mental health unit.     Francesco Duncan M.D.  Psychiatry

## 2023-11-04 NOTE — PROGRESS NOTES
Pt has been napping in her room. Affect is calm.     Report given to evening shift to update pt regarding new pt care orders r/t clothing and phone privileges.    Caryn Matta RN

## 2023-11-04 NOTE — TELEPHONE ENCOUNTER
00:36 - [facility] called back to report that [facility] has declined pt d/t acuity  02:54 - Discussed w/ CRN on 4A - appropriate for roommate  02:56 - Left message for [facility] provider. Awaiting CB  04:00 - Dr. Duncan accepts pt for MH admission. Placed pt in queue for 4A (32)    R: 4A / [facility]     04:01 - Notified unit RN  04:03 - Notified ED of placement and that pt will have a roommate    Indicia completed

## 2023-11-04 NOTE — CARE PLAN
11/04/23 1336   Patient Belongings   Did you bring any home meds/supplements to the hospital?  No   Patient Belongings locker   Patient Belongings Remaining with Patient cash/credit card;cell phone/electronics;clothing   Patient Belongings Put in Hospital Secure Location (Security or Locker, etc.) cash/credit card;cell phone/electronics;clothing;money (see comment);shoes   Belongings Search Yes   Clothing Search Yes   Second Staff Caryn TREVINO RN     Sent to security: cash 6$, Venmo card - 3114, [place] card - 0512, [place] card - 9217, [place] E[place], [place] P- EBT, wings stop gift card, Nataliya card - 4413, Netspend -[id number]    In locker: winter coat, slippers, phone, socks x7, Bra, sweatshirt, tshirt x2, sweatpants, shoes, art supplies x5, book, toiletries x7, pajamas set, robe.     A               Admission:  I am responsible for any personal items that are not sent to the safe or pharmacy.  [facility] is not responsible for loss, theft or damage of any property in my possession.    Signature:  _________________________________ Date: _______  Time: _____                                              Staff Signature:  ____________________________ Date: ________  Time: _____      2nd Staff person, if patient is unable/unwilling to sign:    Signature: ________________________________ Date: ________  Time: _____     Discharge:  [facility] has returned all of my personal belongings:    Signature: _________________________________ Date: ________  Time: _____                                          Staff Signature:  ____________________________ Date: ________  Time: _____

## 2023-11-04 NOTE — PLAN OF CARE
Problem: Anxiety Signs/Symptoms  Goal: Optimized Energy Level (Anxiety Signs/Symptoms)  Outcome: Progressing   Goal Outcome Evaluation:    Problem: Anxiety Signs/Symptoms  Goal: Optimized Energy Level (Anxiety Signs/Symptoms)  Outcome: Progressing        Pt was napping at the beginning of the shift. Pt is calm cooperative with care.  She endorses shoulder pain, SI with no plans and contracted for safety. She denies anxiety, depression AH, VH. Pt had flat affect, paucity with words during assessment.     Pt ate adequately for dinner. Scheduled medication compliant. PRN Seroquel 50mg, Ativan and Tylenol given with some effect this shift.     Pt used her phone to call her children tonight. No concerns at this time. Will continue to monitor.
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
## 2023-11-05 PROCEDURE — 124N000002 HC R&B MH UMMC

## 2023-11-05 PROCEDURE — 250N000013 HC RX MED GY IP 250 OP 250 PS 637: Performed by: EMERGENCY MEDICINE

## 2023-11-05 PROCEDURE — 250N000013 HC RX MED GY IP 250 OP 250 PS 637: Performed by: PSYCHIATRY & NEUROLOGY

## 2023-11-05 PROCEDURE — 99232 SBSQ HOSP IP/OBS MODERATE 35: CPT | Performed by: PSYCHIATRY & NEUROLOGY

## 2023-11-05 RX ADMIN — LORAZEPAM 1 MG: 1 TABLET ORAL at 21:02

## 2023-11-05 RX ADMIN — QUETIAPINE FUMARATE 100 MG: 50 TABLET ORAL at 11:38

## 2023-11-05 RX ADMIN — PROPRANOLOL HYDROCHLORIDE 60 MG: 60 CAPSULE, EXTENDED RELEASE ORAL at 10:01

## 2023-11-05 RX ADMIN — FERROUS GLUCONATE 324 MG: 324 TABLET ORAL at 11:38

## 2023-11-05 RX ADMIN — QUETIAPINE FUMARATE 100 MG: 50 TABLET ORAL at 17:23

## 2023-11-05 RX ADMIN — MULTIPLE VITAMINS W/ MINERALS TAB 1 TABLET: TAB at 10:01

## 2023-11-05 RX ADMIN — QUETIAPINE FUMARATE 100 MG: 50 TABLET ORAL at 21:02

## 2023-11-05 RX ADMIN — DIVALPROEX SODIUM 1500 MG: 500 TABLET, DELAYED RELEASE ORAL at 21:02

## 2023-11-05 ASSESSMENT — ACTIVITIES OF DAILY LIVING (ADL)
ADLS_ACUITY_SCORE: 20
DRESS: STREET CLOTHES
ORAL_HYGIENE: INDEPENDENT
LAUNDRY: WITH SUPERVISION
ADLS_ACUITY_SCORE: 20
HYGIENE/GROOMING: INDEPENDENT

## 2023-11-05 NOTE — PLAN OF CARE
Problem: Anxiety Signs/Symptoms  Goal: Improved Sleep (Anxiety Signs/Symptoms)  Intervention: Promote Healthy Sleep Hygiene  Recent Flowsheet Documentation  Taken 11/5/2023 0127 by Lata Bell RN  Sleep Hygiene Promotion:   awakenings minimized   noise level reduced   Goal Outcome Evaluation:    Patient was asleep at the start of the shift and remained asleep most of the night. Total sleep hours was 6.5 hours. No indication of pain or discomfort. Safety protocols implemented with no occurrence. No prn needed and no behavioral concerns at this time. Will continue to monitor and provide support.

## 2023-11-05 NOTE — PLAN OF CARE
"  Problem: Adult Inpatient Plan of Care  Goal: Optimal Comfort and Wellbeing  Outcome: Not Progressing     Problem: Anxiety Signs/Symptoms  Goal: Improved Mood Symptoms (Anxiety Signs/Symptoms)  Outcome: Not Progressing       Pt is somber, depressed throughout morning. Pt eats and drinks well. She reads a book in the common area of the unit as well as in the River room at the end of the hood. Pt is given Seroquel 100 mg po prn for anxiety. When writer approaches pt to ask about effectiveness of the prn, pt states that it was somewhat helpful. Writer asks if she currently feels as though she wants to harm herself. Pt is upset by this question: \"Why am I being asked about this four times a day?! I don't want to talk about this when I am in a place that I cannot possibly do something to myself. If I could leave here, would I go out and kill myself?! Yes!\" Pt becomes more agitated and asks to leave. Writer spends approximately an hour attempting to deescalate patient, pt's provider is called twice to discuss 12 hour intent/72 hour hold. Pt is highly focused on voluntary admission form that she signed. Writer attempts to explain that due to patient's statements of wanting to kill herself, she would not be released, but would be placed on a hold. \"I signed a paper that says I can leave AMA if I want to. I don't agree with the treatment that has been recommended and the doctor didn't listen to me. I sat in the ED for four days between a homicidal maniac and a drag queen and now I come up here and you're not helping me, either!\" Pt at one point states: \"I will go apeshit if you don't let me out of here!\" Pt intermittently denies that she made suicidal statements. Pt remains argumentative. Writer is direct in telling pt that the hospital has a legal obligation to take her statements seriously and to keep her safe until she is felt to be well enough to leave. Writer was direct in telling patient that she would not be released " "this evening.     Pt was offered a twelve hour intent form. She stated: \"What is the point of this if he's just going to put me on a hold anyway?\" Writer reiterated that pt would be better off to speak with MD in the morning. Writer explained that a petition for commitment would follow a 72 hour hold, that she would speak with a court-appointed examiner who would help determine if the court agreed with hospital regarding her commitment. \"Well, how long would it take to speak with that person?!\"     Pt ended-up declining the twelve hour intent. She threw the papers down on her bed and said: \"Okay, I'll just put on a happy face in here like I do on the outside and I just fool you into letting me go. Okay, you can leave (pt motions to me to get out of her room). Pt exits her room and walks down the hood. \"You bitch!\"     Pt has spent time at the end of the hood and appears to be more calm. She has interacted with the PA and was last observed by this writer to be coloring.     Caryn Matta RN                           "
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
## 2023-11-06 PROCEDURE — 250N000013 HC RX MED GY IP 250 OP 250 PS 637: Performed by: PSYCHIATRY & NEUROLOGY

## 2023-11-06 PROCEDURE — 90853 GROUP PSYCHOTHERAPY: CPT

## 2023-11-06 PROCEDURE — 99232 SBSQ HOSP IP/OBS MODERATE 35: CPT | Performed by: PSYCHIATRY & NEUROLOGY

## 2023-11-06 PROCEDURE — 124N000002 HC R&B MH UMMC

## 2023-11-06 PROCEDURE — 250N000013 HC RX MED GY IP 250 OP 250 PS 637: Performed by: EMERGENCY MEDICINE

## 2023-11-06 PROCEDURE — G0177 OPPS/PHP; TRAIN & EDUC SERV: HCPCS

## 2023-11-06 RX ADMIN — ACETAMINOPHEN 650 MG: 325 TABLET, FILM COATED ORAL at 09:15

## 2023-11-06 RX ADMIN — QUETIAPINE FUMARATE 50 MG: 50 TABLET ORAL at 10:02

## 2023-11-06 RX ADMIN — PROPRANOLOL HYDROCHLORIDE 60 MG: 60 CAPSULE, EXTENDED RELEASE ORAL at 08:06

## 2023-11-06 RX ADMIN — QUETIAPINE FUMARATE 100 MG: 50 TABLET ORAL at 21:18

## 2023-11-06 RX ADMIN — FERROUS GLUCONATE 324 MG: 324 TABLET ORAL at 08:56

## 2023-11-06 RX ADMIN — QUETIAPINE FUMARATE 100 MG: 50 TABLET ORAL at 15:56

## 2023-11-06 RX ADMIN — DIVALPROEX SODIUM 1500 MG: 500 TABLET, DELAYED RELEASE ORAL at 21:18

## 2023-11-06 RX ADMIN — MULTIPLE VITAMINS W/ MINERALS TAB 1 TABLET: TAB at 08:06

## 2023-11-06 ASSESSMENT — ACTIVITIES OF DAILY LIVING (ADL)
HYGIENE/GROOMING: INDEPENDENT
LAUNDRY: WITH SUPERVISION
ADLS_ACUITY_SCORE: 20
ORAL_HYGIENE: INDEPENDENT
ADLS_ACUITY_SCORE: 20
HYGIENE/GROOMING: INDEPENDENT
ADLS_ACUITY_SCORE: 20
DRESS: INDEPENDENT;STREET CLOTHES
ADLS_ACUITY_SCORE: 20
ORAL_HYGIENE: INDEPENDENT
DRESS: INDEPENDENT
ADLS_ACUITY_SCORE: 20
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
## 2023-11-06 NOTE — PLAN OF CARE
Problem: Anxiety Signs/Symptoms  Goal: Improved Mood Symptoms (Anxiety Signs/Symptoms)  Intervention: Optimize Emotion and Mood  Recent Flowsheet Documentation  Taken 11/5/2023 1600 by Loraine Diaz RN  Supportive Measures: active listening utilized     Problem: Suicide Risk  Goal: Absence of Self-Harm  Intervention: Assess Risk to Self and Maintain Safety  Recent Flowsheet Documentation  Taken 11/5/2023 1600 by Loraine Daiz RN  Self-Harm Prevention: (encouraged prn use) other (see comments)    Goal Outcome Evaluation:    Plan of Care Reviewed With: patient      Pt was very distraught about her experiences this hospitalization. Had a difficult time in emergency room and spoke at length about this. Stated she feels she is not being listened to while on this unit. Endorsed anxiety and was given prn quetiapine (100 mg) x 2. Did not ask explicit question of SI, SIB (due to reported triggering during day) but RN asked if she would come to staff if she felt unsafe and she agreed to this. Spent a good deal of time coloring in kitchen area. Offered a soft care mattress due to reported poor sleep and neck pain. Declined any medication for neck and said she has been using heat. Ate her dinner and had a visitor that brought her food as well. Social with a few peers this evening. Expressed stressors at home and stated she wants to leave so she can get her special needs child to/from a program on Wednesday. Gave bedtime meds and took ativan for her neck tension. Got ear plugs due to her roommate snoring.     BP 95/71 (BP Location: Right arm)   Pulse 103   Temp 97  F (36.1  C) (Temporal)   Resp 16   LMP 04/10/2016 (Exact Date)   SpO2 100%
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
## 2023-11-06 NOTE — DISCHARGE INSTRUCTIONS
Behavioral Discharge Planning and Instructions    Summary: You were admitted on 11/2/2023  due to worsening psychosocial stress and suicidal ideation. . You were treated by Dr. Reese and discharged on 11/07/2023 from [address] to Home    Main Diagnosis:   Your treatment provider has dx you with Bipolar 2 disorder, major depressive episode     Health Care Follow-up:     We attempted to assist you with this and you noted that you would complete this independently outpatient. Therefore we could not assist you with establishing care near your current place of residence. You noted that you will plan to see your PCP for medications refills in the future. Below are additional resources to inquire about providers that will accept patients with out insurance until you successfully obtain medical assistance.     -[facility] [phone] ext. [phone]  -[facility] [phone]  -Grief Support Program [phone]  -[facility] 1-804.687.2635  -[facility] [phone]  -[facility] [place] [facility] 1-161.150.2396  -[facility] [phone]  -Suicide Prevention [phone]    Additional information and contact information for your current care team  You noted that you have engaged with a  and [facility] in the community. You noted that you will see your PCP for medication management. If you are interested in obtaining a Psychiatrist, [facility] have providers that are accepting new patients in the area near you [phone]    Additional Resources and   Medical Assistance- We attempted to assist you with this and you noted that you would complete this independently outpatient. We provided you a paper copy of the MA application to complete. Below are resources and next steps to complete this application    Applying via paper copy:  You can call [facility] directly and ask them to assist you with this paperwork and next steps. They offer phone call assistance or in person assistance with application. We provided you a paper copy of the application. [facility] now has a convenient, secure drop box located next to the front door that can be used any time! Please put your paperwork in an envelope and write the name/department on the front of the envelope. Please note, originals will NOT be returned.    Address to social service office   [address] [address]  Phone: [phone]  Fax: [phone]    Applying online next steps: [facility] has launched a new, online application to use when applying for public assistance benefits, which has replaced Apply[place]. Apply at [place] for the following programs:   -Cash  -Food Support (SNAP)   The SNAP program does require an interview. The interview can be scheduled to be done over the phone if you mail in or drop off your application.  It can also be done in person at this office Monday through Friday from 8:00 AM to 4:00 PM.  Emergency SNAP - Please be available for an interview within 24 hours if your household meets the criteria below:   -Households with monthly gross income less than $150 and liquid assets less than $100.  -Migrant or seasonal  households that have low income and $100 or less in liquid assets.  -Households in which the combined monthly gross income and liquid assets are less than their monthly housing costs and the applicable standard utility deduction, if applicable.  Emergency Assistance  Housing Support Program (Group Residential Housing)    You asked about Food Shelves Resources   [place] [employer] Shelf 1-489.166.4028   [place] Food Shelf 4-449-387-0979  [place] Food Shelf 1-202.737.7542  [place] Food Shelf 1-548.995.5240  [place] [employer]aring Shelves 1-942.900.7897  [place] Food Shelf  7-770-662-2955  [facility] Food Shelf 1-433-170-9391  [facility] 3-870-954-6928  [facility] 2-167-502-0827   [employer] 4-330-362-8020  WIC- Women, Infants, Children 1-780.265.9747    Meals on Wheels  [facility] [facility] [employer] Whe[employer] ([place]) 3-869-233-2311 or 8590  [facility] (evening meal) [phone]  [place] [facility] ([place]) [phone] ext [phone]  [facility] [employer].   ([place], [place], [place], [place], & [place]) 1-177.980.4088  [place] Food Helpline 1-701.812.4503  Mom s Meals [phone]    Furniture & Clothing Resources  [employer] 1-213.259.1243  [employer] 1-300.577.7532  [employer] [phone]  [employer] [phone]        Attend all scheduled appointments with your outpatient providers. Call at least 24 hours in advance if you need to reschedule an appointment to ensure continued access to your outpatient providers.     Major Treatments, Procedures and Findings:  You were provided with: a psychiatric assessment, assessed for medical stability, medication evaluation and/or management, group therapy, individual therapy, milieu management, and medical interventions    Symptoms to Report: feeling more aggressive, increased confusion, losing more sleep, mood getting worse, or thoughts of suicide    Early warning signs can include: increased depression or anxiety sleep disturbances increased thoughts or behaviors of suicide or self-harm  increased unusual thinking, such as paranoia or hearing voices    Safety and Wellness:  Take all medicines as directed.  Make no changes unless your doctor suggests them.      Follow treatment recommendations.  Refrain from alcohol and non-prescribed drugs.  If there is a concern for safety, call 911.    Resources:   Crisis Intervention: 675.441.3907 or 529-160-5412 (TTY: 441.863.5287).  Call anytime for help.  National [place] on  "Mental Illness (www.mn.keerthi.org): 369.155.5695 or 155-850-0702.  [place] Association for Children's Mental Health (www.mac.org): 364.352.2836.  Alcoholics Anonymous (www.alcoholics-anonymous.org): Check your phone book for your local chapter.  Suicide Awareness Voices of Education (SAVE) (www.save.org): 165-059-YHHO (6585)  National Suicide Prevention Line (www.mentalhealthmn.org): 886-739-DAEX (5095)  Mental Health Consumer/Survivor Network of [place] (www.mhcsn.net): 352.168.8912 or 712-645-4661  Mental Health Association of [place] (www.mentalhealth.org): 499.427.6970 or 910-193-8406  Self- Management and Recovery Training., SMART-- Toll free: 140.417.3639  www.IEV.Buy.On.Social  [facility] Crisis (COPE) Response - Adult 023 044-7641  Text 4 Life: txt \"LIFE\" to 11472 for immediate support and crisis intervention  Crisis text line: Text \"[place]\" to 776832. Free, confidential, 24/7.  Crisis Intervention: 863.692.9063 or 493-480-2425. Call anytime for help.   [facility] Crisis Team - Child: [phone]    General Medication Instructions:   See your medication sheet(s) for instructions.   Take all medicines as directed.  Make no changes unless your doctor suggests them.   Go to all your doctor visits.  Be sure to have all your required lab tests. This way, your medicines can be refilled on time.  Do not use any drugs not prescribed by your doctor.  Avoid alcohol.    Advance Directives:   Scanned document on file with [employer]? Minor-N/A  Is document scanned? Minor-N/A  Honoring Choices Your Rights Handout: Minor - N/A  Was more information offered? Minor-N/A    The Treatment team has appreciated the opportunity to work with you. If you have any questions or concerns about your recent admission, you can contact the unit which can receive your call 24 hours a day, 7 days a week. They will be able to get in touch with a Provider if needed. The unit number is [phone] .  "

## 2023-11-06 NOTE — PLAN OF CARE
Problem: Sleep Disturbance  Goal: Adequate Sleep/Rest  Outcome: Progressing    Patient slept a total of 7 hours. No acute distress noted. No PRNs given. Routine safety checks were completed every 15 minutes. Patient was observed in bed sleeping throughout the shift. Plan of care continued.
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
## 2023-11-06 NOTE — PLAN OF CARE
"  Problem: Anxiety Signs/Symptoms  Goal: Optimized Energy Level (Anxiety Signs/Symptoms)  Outcome: Progressing  Intervention: Optimize Energy Level  Recent Flowsheet Documentation  Taken 11/6/2023 0832 by Lori Ortega RN  Activity (Behavioral Health): up ad anuj     Problem: Suicide Risk  Goal: Absence of Self-Harm  Outcome: Progressing   Goal Outcome Evaluation:       Patient was awake and in the dining room coloring by 0800. She took her morning medications without issue and then had her breakfast as well.    During check in the patient rated their anxiety a \"6/10\" and stated \"I'm always anxious\". She requested and received PRN Seroquel 50 mg at 1002. She denied depression, SI, HI and hallucinations. She contracted for safety. Patient stated that they had \"6/10 neck pain\" and requested PRN Ativan for it. Patient stated \"that's what they have been giving me over the weekend and it worked well\". Writer encouraged her to try Tylenol instead as Ativan is not used for the treatment of pain. Patient accepted this explanation and took PRN Tylenol at 0912 and one hour later stated her neck pain was \"better\" and decreased in pain from a 6 to 4/10. She also stated that the soft care mattress is helpful. Patient declined further intervention.    Patient spent the entire morning in the dining room coloring and attending groups. Affect was flat and restricted.    Precautions: Suicide        Addendum: Around 1300 patient had multiple complaints about the following staff: Provider, RN on unit 4A and a staff in the ER. She reported that she is planning on reporting them when she discharges due to them not listening to her concerns and malpractice. Writer listened to her complaints and acknowledged that she sounds angry and frustrated.          "

## 2023-11-06 NOTE — PROGRESS NOTES
11/06/23 1630   Group Therapy Session   Group Attendance attended group session   Time Session Began 1315   Time Session Ended 1400   Total Time (minutes) 45   Total # Attendees 4   Group Type recreation   Group Session Detail Education provided on the importance of healthy leisure, the relationship with boredom and relapse, with hands on leisure exploration with Skip Stevenson activity for concentration, new learning, sequencing, direction following, logic and strategy skill practice, simple problem solving, coping, reality-based activity, mood stabilization, an opportunity to experience success, and expansion of social skills.   Patient Participation Detail Pt was mostly pleasant throughout the group session.  She demonstrated poor boundaries with peers and pushed the boundaries of appropriate language and group discussion topics during the group.  She was easier to redirect as rapport with therapist deepened.  Pt also presents with social skills that below her stated age.  Pt was able to learn an unfamiliar activity with a bit of repetition, though some errors still remained at the end of the group.  Pt was very encouraging to a peer who was struggling.

## 2023-11-07 VITALS
OXYGEN SATURATION: 97 % | SYSTOLIC BLOOD PRESSURE: 108 MMHG | DIASTOLIC BLOOD PRESSURE: 70 MMHG | HEART RATE: 109 BPM | TEMPERATURE: 97.7 F | RESPIRATION RATE: 16 BRPM

## 2023-11-07 LAB — VALPROATE SERPL-MCNC: 120 UG/ML

## 2023-11-07 PROCEDURE — 36415 COLL VENOUS BLD VENIPUNCTURE: CPT | Performed by: PSYCHIATRY & NEUROLOGY

## 2023-11-07 PROCEDURE — 99239 HOSP IP/OBS DSCHRG MGMT >30: CPT | Performed by: PSYCHIATRY & NEUROLOGY

## 2023-11-07 PROCEDURE — 250N000013 HC RX MED GY IP 250 OP 250 PS 637: Performed by: PSYCHIATRY & NEUROLOGY

## 2023-11-07 PROCEDURE — G0177 OPPS/PHP; TRAIN & EDUC SERV: HCPCS

## 2023-11-07 PROCEDURE — 250N000013 HC RX MED GY IP 250 OP 250 PS 637: Performed by: EMERGENCY MEDICINE

## 2023-11-07 PROCEDURE — 80164 ASSAY DIPROPYLACETIC ACD TOT: CPT | Performed by: PSYCHIATRY & NEUROLOGY

## 2023-11-07 RX ORDER — DIVALPROEX SODIUM 500 MG/1
20 TABLET, DELAYED RELEASE ORAL AT BEDTIME
Qty: 90 TABLET | Refills: 0 | Status: SHIPPED | OUTPATIENT
Start: 2023-11-07 | End: 2023-11-07

## 2023-11-07 RX ORDER — DIVALPROEX SODIUM 500 MG/1
1000 TABLET, DELAYED RELEASE ORAL AT BEDTIME
Qty: 60 TABLET | Refills: 0 | Status: SHIPPED | OUTPATIENT
Start: 2023-11-07 | End: 2024-08-22

## 2023-11-07 RX ORDER — QUETIAPINE FUMARATE 50 MG/1
50 TABLET, FILM COATED ORAL 4 TIMES DAILY PRN
Qty: 100 TABLET | Refills: 0 | Status: SHIPPED | OUTPATIENT
Start: 2023-11-07 | End: 2024-08-22

## 2023-11-07 RX ADMIN — FERROUS GLUCONATE 324 MG: 324 TABLET ORAL at 08:22

## 2023-11-07 RX ADMIN — PROPRANOLOL HYDROCHLORIDE 60 MG: 60 CAPSULE, EXTENDED RELEASE ORAL at 08:22

## 2023-11-07 RX ADMIN — QUETIAPINE FUMARATE 100 MG: 50 TABLET ORAL at 08:22

## 2023-11-07 RX ADMIN — MULTIPLE VITAMINS W/ MINERALS TAB 1 TABLET: TAB at 08:22

## 2023-11-07 ASSESSMENT — ACTIVITIES OF DAILY LIVING (ADL)
ADLS_ACUITY_SCORE: 20
HYGIENE/GROOMING: INDEPENDENT
ADLS_ACUITY_SCORE: 20
ADLS_ACUITY_SCORE: 20
DRESS: INDEPENDENT
ORAL_HYGIENE: INDEPENDENT
ADLS_ACUITY_SCORE: 20

## 2023-11-07 NOTE — CARE PLAN
OT PROGRESS NOTE:     11/07/23 1211   Group Therapy Session   Group Attendance attended group session   Time Session Began 1015   Time Session Ended 1200   Total Time (minutes) 90   Group Type expressive therapy;task skill   Group Topic Covered balanced lifestyle;coping skills/lifestyle management;emotions/expression;problem-solving   Group Session Detail Crafts/creative expression to promote; focus, concentration, problem solving, planning, organization, time management, life balance, positive coping and social interaction for improved self management.     Patient Response/Contribution cooperative with task;discussed personal experience with topic;organized   Patient Participation Detail Initiated group and actively participated. Focused and organized with task while socializing with peer. Good attention to details and planning. Thoughts organized and future focused. Identified positive activities to structure time, blow off steam and recharge self.

## 2023-11-07 NOTE — PLAN OF CARE
11/06/23      Group Therapy Session   Group Attendance Patient attended this group Session   Time Session Began 20:00   Time Session Ended 20:50   Total Time (minutes) 60      Total # Attendees 7   Group Type Psychotherapy   Group Topic Covered Psychotherapy on insight orientation, emotion regulation, perspective-checking, use of social support, and violence prevention.   Group Session Detail This patient participated in a group that reviewed individual clinical needs, insight orientation, emotion regulation, perspective-checking, use of social support, and violence prevention. Group delved into a discussion on how each participant's current symptoms may benefit from insight orientation, support seeking, and perspective checking. Each patient offered their understanding of the concepts and how they can enhance those in order to achieve positive mental health outcomes. Patients also brainstormed on different ways to enhance insight, especially when experiencing difficult situations/symptoms such as psychosis.    Patient Response/Contribution This patient attended the group. Responded to prompting. Offered feedback to other group members.   Patient Participation Detail This patient attended this session. Provided feedback and received feedback.    Barrett Bowie, Ph.D., [facility].

## 2023-11-07 NOTE — PLAN OF CARE
"  Problem: Adult Inpatient Plan of Care  Goal: Plan of Care Review  Description: The Plan of Care Review/Shift note should be completed every shift.  The Outcome Evaluation is a brief statement about your assessment that the patient is improving, declining, or no change.  This information will be displayed automatically on your shift  note.  Outcome: Adequate for Care Transition  Goal: Patient-Specific Goal (Individualized)  Description: You can add care plan individualizations to a care plan. Examples of Individualization might be:  \"Parent requests to be called daily at 9am for status\", \"I have a hard time hearing out of my right ear\", or \"Do not touch me to wake me up as it startles  me\".  Outcome: Adequate for Care Transition  Goal: Absence of Hospital-Acquired Illness or Injury  Outcome: Adequate for Care Transition  Intervention: Prevent Skin Injury  Recent Flowsheet Documentation  Taken 11/7/2023 0828 by Lori Ortega RN  Body Position: position changed independently  Goal: Optimal Comfort and Wellbeing  Outcome: Adequate for Care Transition  Intervention: Provide Person-Centered Care  Recent Flowsheet Documentation  Taken 11/7/2023 0828 by Lori Ortega RN  Trust Relationship/Rapport:   care explained   choices provided   emotional support provided   empathic listening provided   questions answered   questions encouraged   reassurance provided   thoughts/feelings acknowledged  Goal: Readiness for Transition of Care  Outcome: Adequate for Care Transition     Problem: Sleep Disturbance  Goal: Adequate Sleep/Rest  Outcome: Adequate for Care Transition     Problem: Anxiety Signs/Symptoms  Goal: Optimized Energy Level (Anxiety Signs/Symptoms)  Outcome: Adequate for Care Transition  Intervention: Optimize Energy Level  Recent Flowsheet Documentation  Taken 11/7/2023 0828 by Lori Ortega RN  Activity (Behavioral Health): up ad anuj  Goal: Improved Mood Symptoms (Anxiety " Signs/Symptoms)  Outcome: Adequate for Care Transition  Goal: Improved Sleep (Anxiety Signs/Symptoms)  Outcome: Adequate for Care Transition     Problem: Suicide Risk  Goal: Absence of Self-Harm  Outcome: Adequate for Care Transition   Goal Outcome Evaluation:    Patients AVS was verbally reviewed with the patient and a copy was also given to her. All ordered medications were verbally reviewed with patient and she stated she understood the information. Patient declined to have staff schedule her follow up appointments but written information as to how to schedule appointments was provided in the AVS and patient verbalized understanding of how to schedule them. Patient denied SI and HI prior to discharge and was told to call 911 or to go to the emergency room immediately if these thoughts occur after discharge. Patient verbalized understanding of what to do should she feel unsafe in the community. All of the belongings that she brought with her to the hospital were returned to her including those in security. 2 medications were ordered to give to the patient at discharge and she was given them at time leaving the unit.  Patient was picked up via car by her brother to drive the patient home. Patient discharged and left the unit at 1405. Writer walked with her to the first floor and she got into the car with her brother to be transported home.

## 2023-11-07 NOTE — PLAN OF CARE
Problem: Suicide Risk  Goal: Absence of Self-Harm  Intervention: Assess Risk to Self and Maintain Safety     Problem: Anxiety Signs/Symptoms  Goal: Improved Mood Symptoms (Anxiety Signs/Symptoms)  Outcome: Progressing    Goal Outcome Evaluation:    Plan of Care Reviewed With: patient      Pt reported she was feeling less triggered. She is agreeable to find staff if she feels unsafe or suicidal. Stated she was feeling less irritable about her hospitalization since the weekend. Indicated that having a plan to see a psychiatrist to get a second opinion was helping her move forward. Again expressed her hope to discharge tomorrow due to a commitment with her child on Wednesday. Coloring in [employer], working on word [employer],  and very social with a group of peers on the unit. Requesting prn Seroquel x 2 100 mg and affect was quite calm during check ins. Reported she would need a heat pack for her neck at bedtime but was generally pain free.     /70 (BP Location: Left arm)   Pulse 66   Temp 97.6  F (36.4  C) (Temporal)   Resp 16   LMP 04/10/2016 (Exact Date)   SpO2 98%
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
## 2023-11-07 NOTE — PLAN OF CARE
11/07/23 1125   Individualization/Patient Specific Goals   Patient Personal Strengths expressive of emotions;expressive of needs;self-reliant   Patient Vulnerabilities family/relationship conflict;food insecurity;lacks insight into illness;poor impulse control;limited support system   Anxieties, Fears or Concerns none noted   Individualized Care Needs none noted   Interprofessional Rounds   Summary Pt admitted due to suicidal ideations and psycho social stressors. PT requesting discharge today and provider has confirmed discharge for today. She will discharge home with OP supports   Participants CTC;psychiatrist;nursing   Behavioral Team Discussion   Participants Dr. Reese, Naty Chacon [facility], and Carleen Ortega RN   Progress Pt will be discharging today. Denies SI ideations   Anticipated length of stay discharge today   Continued Stay Criteria/Rationale discharge today   Medical/Physical no acute needs discussed today . Pt will has labs pending for Depokote level   Precautions see below   Plan discharge home today with OP supports   Anticipated Discharge Disposition home or self-care   PRECAUTIONS AND SAFETY    Behavioral Orders   Procedures    Code 1 - Restrict to Unit    Discontinue 1:1 attendant for suicide risk     Order Specific Question:   I have performed an in person assessment of the patient     Answer:   Based on this assessment the patient no longer requires a one on one attendant at this point in time.     Order Specific Question:   Rationale     Answer:   Patient States able to remain safe in hospital     Order Specific Question:   Rationale     Answer:   Modifications to care environment made to mitigate safety risk     Order Specific Question:   Rationale     Answer:   Routine observations are sufficient to monitor safety.    Routine Programming     As clinically indicated    Status 15     Every 15 minutes.    Suicide precautions     Patients on Suicide Precautions should have a  Combination Diet ordered that includes a Diet selection(s) AND a Behavioral Tray selection for Safe Tray - with utensils, or Safe Tray - NO utensils         Safety  Safety WDL: WDL  Patient Location: dining room, hallway, lounge, patient room, own  Observed Behavior: calm, sitting, sleeping, walking  Observed Behavior (Comment): Social with peers  Safety Measures: environmental rounds completed, safety rounds completed, suicide check-in completed  Diversional Activity: puzzles, art work  Suicidality: Status 15, Room close to team care station (desk), Unpredictable frequency of checking on patient, Optimize communication / relationship to minimize opportunity for self-harm, Promote patient engagement with treatment process  Elopement Assessment: Statements about wanting to leave  Elopement Interventions: status 15

## 2023-11-07 NOTE — PLAN OF CARE
BEH IP Unit Acuity Rating Score (UARS)  Patient is given one point for every criteria they meet.    CRITERIA SCORING   On a 72 hour hold, court hold, committed, stay of commitment, or revocation 0    Patient LOS on BEH unit exceeds 20 days 0  LOS: 3   Patient under guardianship, 55+, otherwise medically complex, or under age 11 0   Suicide ideation without relief of precipitating factors 0   Current plan for suicide 0   Current plan for homicide 0   Imminent risk or actual attempt to seriously harm another without relief of factors precipitating the attempt 0   Severe dysfunction in daily living (ex: complete neglect for self care, extreme disruption in vegetative function, extreme deterioration in social interactions) 0   Recent (last 7 days) or current physical aggression in the ED or on unit 0   Restraints or seclusion episode in past 72 hours 0   Recent (last 7 days) or current verbal aggression, agitation, yelling, etc., while in the ED or unit 0   Active psychosis 0   Need for constant or near constant redirection (from leaving, from others, etc).  0   Intrusive or disruptive behaviors 0   TOTAL 0

## 2023-11-07 NOTE — PLAN OF CARE
Adult Inpatient Safety Plan:     [facility] Adult Inpatient Mental Health Units           [address]                                Cassie Morrison       SAFETY PLAN:  Step 1: Warning signs / cues (Thoughts, images, mood, situation, behavior) that a crisis may be developing:  Thinking Processes: racing thoughts  Mood: worsening depression and agitation  Behaviors:  shaky hands, trouble breathing    Step 2: Coping strategies - Things I can do to take my mind off of my problems without contacting another person (relaxation technique, physical activity):  Distress Tolerance Strategies:  arts and crafts: painting, listen to positive and upbeat music:  , and playing with my kids  Physical Activities: go for a walk      Step 3: Remind myself of people and things that are important to me and worth living for:    Tara Terrell    Step 4: When I am in crisis, I can ask these people to help me use my safety plan:   Name: Scott    Name: Tiffany    Name: Marcelina     Step 5: Making the environment safe:   I will dispose of old medications, remove things I could use to hurt myself, and identify supportive people  Other ways to make my environment safe: leave for a walk    Step 6: Professionals or agencies I can contact during a crisis:  Crisis Intervention: 533.228.8849 or 307-641-1523 (TTY: 899.719.6603).  Call anytime for help.  [employer] [place] (www.mentalhealth.org): 481.822.8208 or 793-380-4110  [facility] Adv[facility]/       If unable to maintain safety despite working your plan, call 911 or go to my nearest emergency department.         Patient helped develop this safety plan and agreed to use it when needed.  Pt has been given a copy of this plan.          Today s date:  November 7, 2023  Completed by Clinician Name/ Credentials:   EVELYN Bonilla   Psychotherapist          Adapted from Safety Plan Template 2008 Ruthann Lopez and Travis Muniz is reprinted with the express  permission of the authors.  No portion of the Safety Plan Template may be reproduced without the express, written permission.  You can contact the authors at [email or web address] or [email or web address].

## 2023-11-07 NOTE — PROGRESS NOTES
Pt appeared to be quietly sleeping in her room at midnight.  No complaints reported early in the night when awake.  No overt signs of distress observed.    6.5 hours of sleep is recorded tonight.   Pt is aware she has a depakote level in am.  Plan is for likely discharge today.

## 2023-11-09 ENCOUNTER — PATIENT OUTREACH (OUTPATIENT)
Dept: CARE COORDINATION | Facility: CLINIC | Age: 33
End: 2023-11-09

## 2023-11-09 NOTE — PROGRESS NOTES
Clinic Care Coordination Contact  [facility]/Voicemail    Clinical Data: Care Coordinator Outreach    Outreach Documentation Number of Outreach Attempt   11/9/2023  10:26 AM 2       Left message on patient's voicemail with call back information and requested return call.    Plan: Care Coordinator will make no further outreaches at this time.    BISHOP Patel   Social Work Clinic Care Coordinator   [facility]  PH: [phone]  [email or web address]

## 2024-05-22 ENCOUNTER — APPOINTMENT (OUTPATIENT)
Dept: MRI IMAGING | Facility: CLINIC | Age: 34
End: 2024-05-22
Attending: FAMILY MEDICINE

## 2024-05-22 ENCOUNTER — HOSPITAL ENCOUNTER (EMERGENCY)
Facility: CLINIC | Age: 34
Discharge: HOME | End: 2024-05-22
Attending: FAMILY MEDICINE | Admitting: FAMILY MEDICINE

## 2024-05-22 VITALS
WEIGHT: 147 LBS | DIASTOLIC BLOOD PRESSURE: 82 MMHG | BODY MASS INDEX: 23.07 KG/M2 | RESPIRATION RATE: 16 BRPM | OXYGEN SATURATION: 100 % | SYSTOLIC BLOOD PRESSURE: 117 MMHG | TEMPERATURE: 98 F | HEIGHT: 67 IN | HEART RATE: 52 BPM

## 2024-05-22 DIAGNOSIS — M79.605 BILATERAL LEG PAIN: ICD-10-CM

## 2024-05-22 DIAGNOSIS — M79.604 BILATERAL LEG PAIN: ICD-10-CM

## 2024-05-22 DIAGNOSIS — R29.898 BILATERAL LEG WEAKNESS: ICD-10-CM

## 2024-05-22 LAB
ALBUMIN SERPL BCG-MCNC: 4.2 G/DL (ref 3.5–5.2)
ALBUMIN UR-MCNC: 30 MG/DL
ALP SERPL-CCNC: 39 U/L (ref 40–150)
ALT SERPL W P-5'-P-CCNC: 18 U/L (ref 0–50)
ANION GAP SERPL CALCULATED.3IONS-SCNC: 11 MMOL/L (ref 7–15)
APPEARANCE UR: CLEAR
AST SERPL W P-5'-P-CCNC: 26 U/L (ref 0–45)
BACTERIA #/AREA URNS HPF: ABNORMAL /HPF
BASOPHILS # BLD AUTO: 0 10E3/UL (ref 0–0.2)
BASOPHILS NFR BLD AUTO: 0 %
BILIRUB SERPL-MCNC: 0.5 MG/DL
BILIRUB UR QL STRIP: NEGATIVE
BUN SERPL-MCNC: 12 MG/DL (ref 6–20)
CALCIUM SERPL-MCNC: 8.9 MG/DL (ref 8.6–10)
CHLORIDE SERPL-SCNC: 107 MMOL/L (ref 98–107)
CK SERPL-CCNC: 175 U/L (ref 26–192)
COLOR UR AUTO: YELLOW
CREAT BLD-MCNC: 0.7 MG/DL (ref 0.5–1)
CREAT SERPL-MCNC: 0.74 MG/DL (ref 0.51–0.95)
CRP SERPL-MCNC: <3 MG/L
DEPRECATED HCO3 PLAS-SCNC: 22 MMOL/L (ref 22–29)
EGFRCR SERPLBLD CKD-EPI 2021: >60 ML/MIN/1.73M2
EGFRCR SERPLBLD CKD-EPI 2021: >90 ML/MIN/1.73M2
EOSINOPHIL # BLD AUTO: 0 10E3/UL (ref 0–0.7)
EOSINOPHIL NFR BLD AUTO: 1 %
ERYTHROCYTE [DISTWIDTH] IN BLOOD BY AUTOMATED COUNT: 13.1 % (ref 10–15)
ERYTHROCYTE [SEDIMENTATION RATE] IN BLOOD BY WESTERGREN METHOD: 28 MM/HR (ref 0–20)
GLUCOSE SERPL-MCNC: 87 MG/DL (ref 70–99)
GLUCOSE UR STRIP-MCNC: NEGATIVE MG/DL
HCT VFR BLD AUTO: 35.1 % (ref 35–47)
HGB BLD-MCNC: 11.4 G/DL (ref 11.7–15.7)
HGB UR QL STRIP: NEGATIVE
HOLD SPECIMEN: NORMAL
IMM GRANULOCYTES # BLD: 0 10E3/UL
IMM GRANULOCYTES NFR BLD: 0 %
KETONES UR STRIP-MCNC: NEGATIVE MG/DL
LACTATE SERPL-SCNC: 1.1 MMOL/L (ref 0.7–2)
LEUKOCYTE ESTERASE UR QL STRIP: NEGATIVE
LYMPHOCYTES # BLD AUTO: 1.6 10E3/UL (ref 0.8–5.3)
LYMPHOCYTES NFR BLD AUTO: 27 %
MCH RBC QN AUTO: 30.7 PG (ref 26.5–33)
MCHC RBC AUTO-ENTMCNC: 32.5 G/DL (ref 31.5–36.5)
MCV RBC AUTO: 95 FL (ref 78–100)
MONOCYTES # BLD AUTO: 0.4 10E3/UL (ref 0–1.3)
MONOCYTES NFR BLD AUTO: 7 %
MUCOUS THREADS #/AREA URNS LPF: PRESENT /LPF
NEUTROPHILS # BLD AUTO: 3.8 10E3/UL (ref 1.6–8.3)
NEUTROPHILS NFR BLD AUTO: 65 %
NITRATE UR QL: NEGATIVE
NRBC # BLD AUTO: 0 10E3/UL
NRBC BLD AUTO-RTO: 0 /100
PH UR STRIP: 7 [PH] (ref 5–7)
PLATELET # BLD AUTO: 229 10E3/UL (ref 150–450)
POTASSIUM SERPL-SCNC: 3.6 MMOL/L (ref 3.4–5.3)
PROT SERPL-MCNC: 7.4 G/DL (ref 6.4–8.3)
RBC # BLD AUTO: 3.71 10E6/UL (ref 3.8–5.2)
RBC URINE: 5 /HPF
SODIUM SERPL-SCNC: 140 MMOL/L (ref 135–145)
SP GR UR STRIP: 1.03 (ref 1–1.03)
SQUAMOUS EPITHELIAL: <1 /HPF
UROBILINOGEN UR STRIP-MCNC: NORMAL MG/DL
VALPROATE SERPL-MCNC: <2.8 UG/ML
WBC # BLD AUTO: 5.8 10E3/UL (ref 4–11)
WBC URINE: 3 /HPF

## 2024-05-22 PROCEDURE — 84075 ASSAY ALKALINE PHOSPHATASE: CPT | Performed by: FAMILY MEDICINE

## 2024-05-22 PROCEDURE — 96376 TX/PRO/DX INJ SAME DRUG ADON: CPT | Performed by: FAMILY MEDICINE

## 2024-05-22 PROCEDURE — 250N000011 HC RX IP 250 OP 636: Performed by: EMERGENCY MEDICINE

## 2024-05-22 PROCEDURE — 250N000011 HC RX IP 250 OP 636: Performed by: STUDENT IN AN ORGANIZED HEALTH CARE EDUCATION/TRAINING PROGRAM

## 2024-05-22 PROCEDURE — 85652 RBC SED RATE AUTOMATED: CPT | Performed by: FAMILY MEDICINE

## 2024-05-22 PROCEDURE — 96375 TX/PRO/DX INJ NEW DRUG ADDON: CPT | Performed by: FAMILY MEDICINE

## 2024-05-22 PROCEDURE — 258N000003 HC RX IP 258 OP 636: Performed by: FAMILY MEDICINE

## 2024-05-22 PROCEDURE — 250N000011 HC RX IP 250 OP 636: Performed by: FAMILY MEDICINE

## 2024-05-22 PROCEDURE — 96374 THER/PROPH/DIAG INJ IV PUSH: CPT | Performed by: FAMILY MEDICINE

## 2024-05-22 PROCEDURE — 80164 ASSAY DIPROPYLACETIC ACD TOT: CPT | Performed by: FAMILY MEDICINE

## 2024-05-22 PROCEDURE — 83605 ASSAY OF LACTIC ACID: CPT | Performed by: FAMILY MEDICINE

## 2024-05-22 PROCEDURE — 99284 EMERGENCY DEPT VISIT MOD MDM: CPT | Performed by: FAMILY MEDICINE

## 2024-05-22 PROCEDURE — 86140 C-REACTIVE PROTEIN: CPT | Performed by: FAMILY MEDICINE

## 2024-05-22 PROCEDURE — 96361 HYDRATE IV INFUSION ADD-ON: CPT | Performed by: FAMILY MEDICINE

## 2024-05-22 PROCEDURE — 82565 ASSAY OF CREATININE: CPT

## 2024-05-22 PROCEDURE — 82550 ASSAY OF CK (CPK): CPT | Performed by: FAMILY MEDICINE

## 2024-05-22 PROCEDURE — 84450 TRANSFERASE (AST) (SGOT): CPT | Performed by: FAMILY MEDICINE

## 2024-05-22 PROCEDURE — 72148 MRI LUMBAR SPINE W/O DYE: CPT

## 2024-05-22 PROCEDURE — 81001 URINALYSIS AUTO W/SCOPE: CPT | Performed by: FAMILY MEDICINE

## 2024-05-22 PROCEDURE — 99285 EMERGENCY DEPT VISIT HI MDM: CPT | Mod: 25 | Performed by: FAMILY MEDICINE

## 2024-05-22 PROCEDURE — 85025 COMPLETE CBC W/AUTO DIFF WBC: CPT | Performed by: FAMILY MEDICINE

## 2024-05-22 PROCEDURE — 36415 COLL VENOUS BLD VENIPUNCTURE: CPT | Performed by: FAMILY MEDICINE

## 2024-05-22 RX ORDER — KETOROLAC TROMETHAMINE 30 MG/ML
30 INJECTION, SOLUTION INTRAMUSCULAR; INTRAVENOUS ONCE
Status: DISCONTINUED | OUTPATIENT
Start: 2024-05-22 | End: 2024-05-22

## 2024-05-22 RX ORDER — OXYCODONE HYDROCHLORIDE 5 MG/1
5 TABLET ORAL EVERY 6 HOURS PRN
Qty: 12 TABLET | Refills: 0 | Status: SHIPPED | OUTPATIENT
Start: 2024-05-22 | End: 2024-05-25

## 2024-05-22 RX ORDER — HYDROMORPHONE HYDROCHLORIDE 1 MG/ML
0.5 INJECTION, SOLUTION INTRAMUSCULAR; INTRAVENOUS; SUBCUTANEOUS ONCE
Status: COMPLETED | OUTPATIENT
Start: 2024-05-22 | End: 2024-05-22

## 2024-05-22 RX ORDER — METHOCARBAMOL 500 MG/1
500 TABLET, FILM COATED ORAL 4 TIMES DAILY PRN
Qty: 20 TABLET | Refills: 0 | Status: SHIPPED | OUTPATIENT
Start: 2024-05-22 | End: 2024-05-27

## 2024-05-22 RX ORDER — KETOROLAC TROMETHAMINE 30 MG/ML
30 INJECTION, SOLUTION INTRAMUSCULAR; INTRAVENOUS ONCE
Status: COMPLETED | OUTPATIENT
Start: 2024-05-22 | End: 2024-05-22

## 2024-05-22 RX ORDER — ONDANSETRON 2 MG/ML
4 INJECTION INTRAMUSCULAR; INTRAVENOUS ONCE
Status: COMPLETED | OUTPATIENT
Start: 2024-05-22 | End: 2024-05-22

## 2024-05-22 RX ORDER — METHOCARBAMOL 750 MG/1
750 TABLET, FILM COATED ORAL 4 TIMES DAILY PRN
Qty: 15 TABLET | Refills: 0 | Status: SHIPPED | OUTPATIENT
Start: 2024-05-22 | End: 2024-08-22

## 2024-05-22 RX ORDER — OXYCODONE AND ACETAMINOPHEN 5; 325 MG/1; MG/1
1 TABLET ORAL EVERY 6 HOURS PRN
Qty: 12 TABLET | Refills: 0 | Status: SHIPPED | OUTPATIENT
Start: 2024-05-22 | End: 2024-08-22

## 2024-05-22 RX ORDER — ONDANSETRON 4 MG/1
4 TABLET, ORALLY DISINTEGRATING ORAL ONCE
Status: COMPLETED | OUTPATIENT
Start: 2024-05-22 | End: 2024-05-22

## 2024-05-22 RX ADMIN — SODIUM CHLORIDE 1000 ML: 9 INJECTION, SOLUTION INTRAVENOUS at 13:44

## 2024-05-22 RX ADMIN — HYDROMORPHONE HYDROCHLORIDE 0.5 MG: 1 INJECTION, SOLUTION INTRAMUSCULAR; INTRAVENOUS; SUBCUTANEOUS at 15:19

## 2024-05-22 RX ADMIN — HYDROMORPHONE HYDROCHLORIDE 0.5 MG: 1 INJECTION, SOLUTION INTRAMUSCULAR; INTRAVENOUS; SUBCUTANEOUS at 21:15

## 2024-05-22 RX ADMIN — HYDROMORPHONE HYDROCHLORIDE 0.5 MG: 1 INJECTION, SOLUTION INTRAMUSCULAR; INTRAVENOUS; SUBCUTANEOUS at 17:17

## 2024-05-22 RX ADMIN — KETOROLAC TROMETHAMINE 30 MG: 30 INJECTION, SOLUTION INTRAMUSCULAR at 13:44

## 2024-05-22 RX ADMIN — ONDANSETRON 4 MG: 2 INJECTION INTRAMUSCULAR; INTRAVENOUS at 17:13

## 2024-05-22 RX ADMIN — ONDANSETRON 4 MG: 4 TABLET, ORALLY DISINTEGRATING ORAL at 22:15

## 2024-05-22 ASSESSMENT — ACTIVITIES OF DAILY LIVING (ADL)
ADLS_ACUITY_SCORE: 39
ADLS_ACUITY_SCORE: 33
ADLS_ACUITY_SCORE: 39
ADLS_ACUITY_SCORE: 39

## 2024-05-22 ASSESSMENT — COLUMBIA-SUICIDE SEVERITY RATING SCALE - C-SSRS
2. HAVE YOU ACTUALLY HAD ANY THOUGHTS OF KILLING YOURSELF IN THE PAST MONTH?: NO
1. IN THE PAST MONTH, HAVE YOU WISHED YOU WERE DEAD OR WISHED YOU COULD GO TO SLEEP AND NOT WAKE UP?: NO
6. HAVE YOU EVER DONE ANYTHING, STARTED TO DO ANYTHING, OR PREPARED TO DO ANYTHING TO END YOUR LIFE?: NO

## 2024-05-22 NOTE — ED TRIAGE NOTES
Pt  does [employer] [place] training and boxing 3x weekly; pt states on Monday pt overworked herself;woke Tuesday morning had significant bilateral leg pain,  pain is located in thighs and buttocks, and can feel radiating to calves and ankles, pain worsens when standing or vertical movement.  Lateral adduction and abduction does not hurt as well rotation.   Tried all OTC meds, epson salt, magnesium, OTC meds.  Concerned for rhabdo, no lower back pain, pt drinking 80-100oz a day.

## 2024-05-22 NOTE — DISCHARGE INSTRUCTIONS
You are seen the emergency department due to pain and weakness in your legs.  You had lab work here that is reassuring, and an MRI of your spine that does not show any signs of any damage to your spinal column.  There was what is called an incidental meaning a finding you are not looking for but found on your vertebrae.  There is some uptake of the radiotracer which can be related to multiple different causes.  After speaking with the radiologist, the thought was this was most consistent with an increase in your red blood cell count production with your low hemoglobin.  However, the differential does include multiple different processes including malignancy.  Overall we feel that this is low likelihood but we would recommend that you follow-up with your primary care doctor going forward.    We had an extensive conversation regarding the concerns that we had about weakness that you could get related to something called Guillain-Barré.  With your pain getting much better and helping your weakness get a lot better, this is much less likely.  However if you start to notice worsening weakness in your legs especially weakness that is moving up into your abdomen and torso we would recommend returning to the emergency department.    Patient seen in the Emergency Room on 5//22/24. She may return back to work on 5/30/24.

## 2024-05-22 NOTE — ED PROVIDER NOTES
"ED Provider Note  [facility]      History     Chief Complaint   Patient presents with    Leg Pain     bilateral     The history is provided by the patient and medical records.     Cassie Morrison is a 33 year old female with a history of Crohn's disease, warm autoimmune hemolytic anemia, and anxiety presents emergency department due to bilateral leg pain following [place] and boxing training.    Patient states that she does weight high, boxing, and generalized fitness 3 times per week.  Patient states that also fatigue is not a new thing, but believes that she overdid it on Monday.  Patient states this is like muscle fatigue but much worse.  Patient is also having weakness in her legs, states that they give out when she has to crawl up and down stairs.  Patient states she also fell out of her truck yesterday.  Patient is tried ibuprofen and Tylenol alternating, Epsom salt and ice baths, stretching, and massaging her legs.  Patient states she has to physically move her legs at night when she rolls over.  Denies tenderness, numbness in legs.  Denies back pain.  Denies symptoms in upper extremities.    Past Medical History  Past Medical History:   Diagnosis Date    Abnormal Pap smear of cervix 2012    Repeat WNL    Allergic     Morphine    Anemia     Anxiety     Chickenpox     x2    Depression     seasonal - been dx since 14    Endometriosis     Endometriosis     dx at 14, 3 laproscopic procedures    Endometriosis     Heart murmur     IUD migration     Kidney stone     1 instance - prior to 2008     Malignant hyperthermia due to anesthesia     Migraine     migraines - back of neck    Papanicolaou smear of cervix with low grade squamous intraepithelial lesion (LGSIL) 6/13/12    PID (pelvic inflammatory disease)     in an ER visit, \"got a shot\" - feels like it was misdiagnosed, endometriosis mis-diagnosed?    Postpartum depression 2012    Was doing the postpartum period alone    Suicidal " ideation 2015    Urinary tract infection     Varicella     twice as a child     Past Surgical History:   Procedure Laterality Date     SECTION N/A 2016    Procedure:  SECTION;  Surgeon: Reno Alamo MD;  Location: [facility] L[facility] OR;  Service:     COLONOSCOPY      COLPOSCOPY      DILATION AND CURETTAGE SUCTION, TREAT INCOMPLETE       D&E    DILATION AND CURETTAGE, OPERATIVE HYSTEROSCOPY, COMBINED N/A 2015    D&C HSC, hysterectomy-2018    ENDOMETRIAL ABLATION      during one of the laparotomies    HC LAPAROSCOPIC IUD REMOVAL  2015    LAPAROSCOPIC HYSTERECTOMY N/A 2018    Procedure: ROBOTC TOTAL LAPAROSCOPIC HYSTERECTOMY, BILATERAL SALPINGECTOMY ;  Surgeon: Reno Alamo MD;  Location: [facility];  Service:     LAPAROSCOPY  2010    endometriosis    LAPAROTOMY EXPLORATORY      3x - last in 2015, , Gyne surgeon in  states no contraindication to vaginal birth per patient    LASER CO2 LAPAROSCOPIC VAPORIZATION ENDOMETRIUM N/A 2015    vaporization of endometriosis    OTHER SURGICAL HISTORY  2015    IUD removallaproscopic procedure    [place] LAP,FULGURATE/EXCISE LESIONS Right 2017    Procedure: LAPAROSCOPY, OVARIAN CYSTECTOMY;  Surgeon: Reno Alamo MD;  Location: [facility];  Service: Gynecology    [facility] ORAL SURGERY PROCEDURE      wisdom teeth x 5     Ferrous Gluconate 324 (37.5 Fe) MG TABS  propranolol ER (INDERAL LA) 60 MG 24 hr capsule  acetaminophen (TYLENOL) 325 MG tablet  divalproex sodium delayed-release (DEPAKOTE) 500 MG DR tablet  multivitamin w/minerals (THERA-VIT-M) tablet  QUEtiapine (SEROQUEL) 50 MG tablet      Allergies   Allergen Reactions    Blood Transfusion Related (Informational Only) Other (See Comments)     Patient has a history of a clinically significant antibody against RBC antigens.  A delay in compatible RBCs may occur.    Morphine Unknown     Nerve pain    Strawberry Extract   "    Family History  Family History   Problem Relation Age of Onset    Blood Disease Paternal Grandfather         anemia    Cancer Paternal Grandfather     Diabetes Paternal Grandmother     Cerebrovascular Disease Father     Cerebrovascular Disease Paternal Grandfather     Depression Mother     Alcohol/Drug Mother         meth    Alcohol/Drug Father         drugs    Arthritis Mother     Substance Abuse Mother     Mental Illness Mother     Early Death Father     Mental Illness Sister         ADHD and anger issues    Alcoholism Maternal Aunt     Substance Abuse Maternal Aunt     Hypertension Maternal Aunt     Kidney Disease Maternal Aunt     Early Death Paternal Uncle     Cerebrovascular Disease Paternal Uncle     Heart Disease Maternal Grandmother     Hearing Loss Maternal Grandmother     Arthritis Maternal Grandmother     Dementia Maternal Grandfather     Hypertension Maternal Grandfather     Hypertension Paternal Grandmother     Arthritis Paternal Grandfather     Asthma Paternal Grandfather     Depression Paternal Grandfather     Hearing Loss Paternal Grandfather      Social History   Social History     Tobacco Use    Smoking status: Former     Types: Vaping Device    Smokeless tobacco: Never    Tobacco comments:     smoking 10cig/day- down to 1-2 with pregnancy   Substance Use Topics    Alcohol use: Yes     Alcohol/week: 0.0 standard drinks of alcohol     Comment: 3 times a month    Drug use: No         A medically appropriate review of systems was performed with pertinent positives and negatives noted in the HPI, and all other systems negative.    Physical Exam   BP: 108/61  Pulse: 73  Temp: 98  F (36.7  C)  Resp: 14  Height: 170.2 cm (5' 7\")  Weight: 66.7 kg (147 lb)  SpO2: 100 %  Physical Exam  Constitutional:       General: She is not in acute distress.     Appearance: Normal appearance. She is not diaphoretic.   HENT:      Head: Atraumatic.      Mouth/Throat:      Mouth: Mucous membranes are moist.   Eyes:     "  General: No scleral icterus.     Conjunctiva/sclera: Conjunctivae normal.   Cardiovascular:      Rate and Rhythm: Normal rate.      Heart sounds: Normal heart sounds.   Pulmonary:      Effort: No respiratory distress.      Breath sounds: Normal breath sounds.   Abdominal:      General: Abdomen is flat.   Musculoskeletal:      Cervical back: Neck supple.   Skin:     General: Skin is warm.      Findings: No rash.   Neurological:      Mental Status: She is alert and oriented to person, place, and time.      Cranial Nerves: Cranial nerves 2-12 are intact.      Motor: Weakness present.      Coordination: Coordination is intact.      Deep Tendon Reflexes:      Reflex Scores:       Patellar reflexes are 1+ on the right side and 1+ on the left side.       Achilles reflexes are 1+ on the right side and 1+ on the left side.     Comments: Patient has subjective weakness of both lower extremities she is able to engage quads but states that she is having hard time lifting her legs patient was able to walk initially in triage several short steps.    Patient's sensory exam was normal.  Initially minimal reflexes however when distracted reflexes appear to be within normal range.           ED Course, Procedures, & Data      Procedures          Results for orders placed or performed during the hospital encounter of 05/22/24   Comprehensive metabolic panel     Status: Abnormal   Result Value Ref Range    Sodium 140 135 - 145 mmol/L    Potassium 3.6 3.4 - 5.3 mmol/L    Carbon Dioxide (CO2) 22 22 - 29 mmol/L    Anion Gap 11 7 - 15 mmol/L    Urea Nitrogen 12.0 6.0 - 20.0 mg/dL    Creatinine 0.74 0.51 - 0.95 mg/dL    GFR Estimate >90 >60 mL/min/1.73m2    Calcium 8.9 8.6 - 10.0 mg/dL    Chloride 107 98 - 107 mmol/L    Glucose 87 70 - 99 mg/dL    Alkaline Phosphatase 39 (L) 40 - 150 U/L    AST 26 0 - 45 U/L    ALT 18 0 - 50 U/L    Protein Total 7.4 6.4 - 8.3 g/dL    Albumin 4.2 3.5 - 5.2 g/dL    Bilirubin Total 0.5 <=1.2 mg/dL    Erythrocyte sedimentation rate auto     Status: Abnormal   Result Value Ref Range    Erythrocyte Sedimentation Rate 28 (H) 0 - 20 mm/hr   CRP inflammation     Status: Normal   Result Value Ref Range    CRP Inflammation <3.00 <5.00 mg/L   Lactic acid whole blood with 1x repeat in 2 hr when >2     Status: Normal   Result Value Ref Range    Lactic Acid, Initial 1.1 0.7 - 2.0 mmol/L   CK total     Status: Normal   Result Value Ref Range     26 - 192 U/L   CBC with platelets and differential     Status: Abnormal   Result Value Ref Range    WBC Count 5.8 4.0 - 11.0 10e3/uL    RBC Count 3.71 (L) 3.80 - 5.20 10e6/uL    Hemoglobin 11.4 (L) 11.7 - 15.7 g/dL    Hematocrit 35.1 35.0 - 47.0 %    MCV 95 78 - 100 fL    MCH 30.7 26.5 - 33.0 pg    MCHC 32.5 31.5 - 36.5 g/dL    RDW 13.1 10.0 - 15.0 %    Platelet Count 229 150 - 450 10e3/uL    % Neutrophils 65 %    % Lymphocytes 27 %    % Monocytes 7 %    % Eosinophils 1 %    % Basophils 0 %    % Immature Granulocytes 0 %    NRBCs per 100 WBC 0 <1 /100    Absolute Neutrophils 3.8 1.6 - 8.3 10e3/uL    Absolute Lymphocytes 1.6 0.8 - 5.3 10e3/uL    Absolute Monocytes 0.4 0.0 - 1.3 10e3/uL    Absolute Eosinophils 0.0 0.0 - 0.7 10e3/uL    Absolute Basophils 0.0 0.0 - 0.2 10e3/uL    Absolute Immature Granulocytes 0.0 <=0.4 10e3/uL    Absolute NRBCs 0.0 10e3/uL   Creatinine POCT     Status: Normal   Result Value Ref Range    Creatinine POCT 0.7 0.5 - 1.0 mg/dL    GFR, ESTIMATED POCT >60 >60 mL/min/1.73m2   Extra Tube     Status: None    Narrative    The following orders were created for panel order Extra Tube.  Procedure                               Abnormality         Status                     ---------                               -----------         ------                     Extra Blue Top Tube[[id number]]                              Final result                 Please view results for these tests on the individual orders.   Extra Blue Top Tube     Status: None   Result Value  Ref Range    Hold Specimen [facility]    CBC with platelets differential     Status: Abnormal    Narrative    The following orders were created for panel order CBC with platelets differential.  Procedure                               Abnormality         Status                     ---------                               -----------         ------                     CBC with platelets and d...[[id number]]  Abnormal            Final result                 Please view results for these tests on the individual orders.     Medications   sodium chloride 0.9% BOLUS 1,000 mL (0 mLs Intravenous Stopped 5/22/24 7567)   ketorolac (TORADOL) injection 30 mg (30 mg Intravenous $Given 5/22/24 1344)   HYDROmorphone (PF) (DILAUDID) injection 0.5 mg (0.5 mg Intravenous $Given 5/22/24 6818)     Labs Ordered and Resulted from Time of ED Arrival to Time of ED Departure   COMPREHENSIVE METABOLIC PANEL - Abnormal       Result Value    Sodium 140      Potassium 3.6      Carbon Dioxide (CO2) 22      Anion Gap 11      Urea Nitrogen 12.0      Creatinine 0.74      GFR Estimate >90      Calcium 8.9      Chloride 107      Glucose 87      Alkaline Phosphatase 39 (*)     AST 26      ALT 18      Protein Total 7.4      Albumin 4.2      Bilirubin Total 0.5     ERYTHROCYTE SEDIMENTATION RATE AUTO - Abnormal    Erythrocyte Sedimentation Rate 28 (*)    CBC WITH PLATELETS AND DIFFERENTIAL - Abnormal    WBC Count 5.8      RBC Count 3.71 (*)     Hemoglobin 11.4 (*)     Hematocrit 35.1      MCV 95      MCH 30.7      MCHC 32.5      RDW 13.1      Platelet Count 229      % Neutrophils 65      % Lymphocytes 27      % Monocytes 7      % Eosinophils 1      % Basophils 0      % Immature Granulocytes 0      NRBCs per 100 WBC 0      Absolute Neutrophils 3.8      Absolute Lymphocytes 1.6      Absolute Monocytes 0.4      Absolute Eosinophils 0.0      Absolute Basophils 0.0      Absolute Immature Granulocytes 0.0      Absolute NRBCs 0.0     CRP INFLAMMATION - Normal    CRP  Inflammation <3.00     LACTIC ACID WHOLE BLOOD WITH 1X REPEAT IN 2 HR WHEN >2 - Normal    Lactic Acid, Initial 1.1     CK TOTAL - Normal         ISTAT CREATININE POCT - Normal    Creatinine POCT 0.7      GFR, ESTIMATED POCT >60     VALPROIC ACID   ROUTINE UA WITH MICROSCOPIC REFLEX TO CULTURE     Lumbar spine MRI w/o contrast    (Results Pending)          Critical care was not performed.     Medical Decision Making  The patient's presentation was of high complexity (an acute health issue posing potential threat to life or bodily function).    The patient's evaluation involved:  ordering and/or review of 3+ test(s) in this encounter (see separate area of note for details)    The patient's management necessitated high risk (a decision regarding hospitalization).    Assessment & Plan        I have reviewed the nursing notes. I have reviewed the findings, diagnosis, plan and need for follow up with the patient.    Patient with bilateral leg weakness possibly secondary to extensive muscle strain at this time CK and inflammatory markers essentially negative patient is awaiting MRI of the lumbar spine at which time she will be reevaluated after treatment with Toradol and Dilaudid.  Patient will be signed out to evening staff Dr. Brooke and will discuss final disposition with Dr. Brooke.    Final diagnoses:   Bilateral leg pain   Bilateral leg weakness   I, Shaan Foote, am serving as a trained medical scribe to document services personally performed by Nic Hoffman MD, based to the provider's statements to me.     INic MD, was physically present and have reviewed and verified the accuracy of this note documented by Shaan Foote.     Nic Hoffman MD  [facility] EMERGENCY DEPARTMENT  5/22/2024     Nic Hoffman MD  05/22/24 [id number]

## 2024-08-22 ENCOUNTER — E-CONSULT (OUTPATIENT)
Dept: ONCOLOGY | Facility: CLINIC | Age: 34
End: 2024-08-22

## 2024-08-22 ENCOUNTER — OFFICE VISIT (OUTPATIENT)
Dept: FAMILY MEDICINE | Facility: CLINIC | Age: 34
End: 2024-08-22

## 2024-08-22 VITALS
HEIGHT: 67 IN | RESPIRATION RATE: 16 BRPM | TEMPERATURE: 97.7 F | WEIGHT: 152.4 LBS | DIASTOLIC BLOOD PRESSURE: 62 MMHG | SYSTOLIC BLOOD PRESSURE: 98 MMHG | HEART RATE: 62 BPM | BODY MASS INDEX: 23.92 KG/M2 | OXYGEN SATURATION: 100 %

## 2024-08-22 DIAGNOSIS — M51.369 BULGING LUMBAR DISC: ICD-10-CM

## 2024-08-22 DIAGNOSIS — R89.8 ABNORMAL BONE MARROW EXAMINATION: Primary | ICD-10-CM

## 2024-08-22 DIAGNOSIS — M43.06 LUMBAR SPONDYLOLYSIS: ICD-10-CM

## 2024-08-22 PROCEDURE — 99203 OFFICE O/P NEW LOW 30 MIN: CPT | Performed by: NURSE PRACTITIONER

## 2024-08-22 PROCEDURE — 99451 NTRPROF PH1/NTRNET/EHR 5/>: CPT | Performed by: INTERNAL MEDICINE

## 2024-08-22 RX ORDER — OMEPRAZOLE 40 MG/1
40 CAPSULE, DELAYED RELEASE ORAL
COMMUNITY
Start: 2023-05-16 | End: 2024-08-22

## 2024-08-22 RX ORDER — FERROUS SULFATE 325(65) MG
325 TABLET ORAL DAILY
COMMUNITY
End: 2024-08-22

## 2024-08-22 ASSESSMENT — PAIN SCALES - GENERAL: PAINLEVEL: MODERATE PAIN (4)

## 2024-08-22 NOTE — PATIENT INSTRUCTIONS
I will call you next week early with hematology answer to the red marrow reconversion seen on MRI.    Make appointment with spine to discuss further evaluation and treatment for your back pain.

## 2024-08-22 NOTE — PROGRESS NOTES
Assessment & Plan     Abnormal bone marrow examination  E-consult placed for work up on red marrow reconversion if needed or referral appropriate.  - Adult E-Consult to Hematology (Outpt Provider to Specialist Written Question & Response)    Lumbar spondylolysis  No red flags.  Chronic low back pain.  Physical Therapy declined.  Referral to spine due to disc bulge.  - Spine  Referral; Future    Bulging lumbar disc  See note above.  - Spine  Referral; Future        MED REC REQUIRED    Post Medication Reconciliation Status: discharge medications reconciled, continue medications without change    See Patient Instructions    Bernadette Matthews is a 33 year old, presenting for the following health issues:  Hospital F/U        8/22/2024    12:51 PM   Additional Questions   Roomed by rmb   Accompanied by self         8/22/2024    12:51 PM   Patient Reported Additional Medications   Patient reports taking the following new medications none     History of Present Illness       Reason for visit:  Hospital follow up    She eats 4 or more servings of fruits and vegetables daily.She consumes 2 sweetened beverage(s) daily.She exercises with enough effort to increase her heart rate 60 or more minutes per day.  She exercises with enough effort to increase her heart rate 3 or less days per week.   She is taking medications regularly.       ED/UC Followup:    Facility: [facility]  Date of visit: 05/22/2024  Reason for visit: bilateral leg pain  Current Status: discharged     MRI shows lumbar disc bulge with spondylosis which is not new but patient has had hx of chronic pain and would like to be referred back to spine.  She has had injections in the back which did not help in the past.  She has had Physical Therapy in the past but this was not helpful and does not want to recheck this.  Symptoms are intermittent.    Incidentally there was a finding of red marrow conversion.  She did have mildly low hgb on CBC but  "otherwise labs were normal in ED.  She is concerned since they told her that this could be a cancer and her mom  of cancer in the spine so she wants to know if there is any follow-up with this finding.      Review of Systems  CONSTITUTIONAL: NEGATIVE for fever, chills, change in weight  MUSCULOSKELETAL:POSITIVE  for low back pain with no radiculopathy currently and improved but hx of chronic back pain and would like to discuss this further with spine again.  PSYCHIATRIC: NEGATIVE for changes in mood or affect      Objective    BP 98/62   Pulse 62   Temp 97.7  F (36.5  C) (Tympanic)   Resp 16   Ht 1.712 m (5' 7.4\")   Wt 69.1 kg (152 lb 6.4 oz)   LMP 04/10/2016 (Exact Date)   SpO2 100%   BMI 23.59 kg/m    Body mass index is 23.59 kg/m .  Physical Exam   GENERAL: alert and no distress  MS: gait is normal, patient is not having any current severe symptoms in the legs but does have tenderness in the low back    Signed Electronically by: Summer Zhu NP      "

## 2024-08-22 NOTE — PROGRESS NOTES
"    8/22/2024     E-Consult has been accepted.    Interprofessional consultation requested by:  Summer Zhu NP      Clinical Question/Purpose: MY CLINICAL QUESTION IS: MRI noted red marrow reconversion on T1, wondering if there is any workup that needs to be done or if I should refer?    Patient assessment and information reviewed: The patient had a MRI of spine for pain, shows \"Diffuse T1 and T2 hypointense marrow signal could represent red marrow reconversion or alternative metabolic/hematopoietic process.\"    She has a mild anemia, which has not been evaluated. The WBC and plateles are normal. Fairly minimal medical history- endometriosis, s/p hysterectomy, and depression, chronic low back pain. The marrow findings on MRI are unlikley to represent any serious bone marrow problem. But she does need an evaluation for the anemia,   Recommendations:   -Send CBCP with differential, reticulocyte count, ferritin, iron TIBC, % saturation, B12 and TSH  -manage based on results.   No need for full hematology referral unless hgb is < 11 with no explanation          The recommendations provided in this E-Consult are based on a review of clinical data pertinent to the clinical question presented, without a review of the patient's complete medical record or, the benefit of a comprehensive in-person or virtual patient evaluation. This consultation should not replace the clinical judgement and evaluation of the provider ordering this E-Consult. Any new clinical issues, or changes in patient status since the filing of this E-Consult will need to be taken into account when assessing these recommendations. Please contact me if you have further questions.    My total time spent reviewing clinical information and formulating assessment was 10 minutes.        Yaneli Bell MD  "

## 2024-08-23 DIAGNOSIS — R19.8 PAIN WITH BOWEL MOVEMENTS: ICD-10-CM

## 2024-08-23 DIAGNOSIS — D64.9 MILD ANEMIA: Primary | ICD-10-CM

## 2024-08-23 DIAGNOSIS — R19.7 INTERMITTENT DIARRHEA: ICD-10-CM

## 2024-08-23 NOTE — PROGRESS NOTES
Hematology states that there is no concern for the red  marrow reconversion on her MRI.  Labs ordered for follow-up on anemia as per recommendation of hematology.  Patient also is having some intermittent diarrhea and cramping pain with stools.  Will order upper and lower endoscopy to complete anemia and bowel issue evaluation.  I will follow-up with patient on results when they are back.  Summer Zhu NP on 8/23/2024 at 11:35 AM    
Caregiver

## 2024-08-26 ENCOUNTER — TELEPHONE (OUTPATIENT)
Dept: SURGERY | Facility: CLINIC | Age: 34
End: 2024-08-26

## 2024-08-26 NOTE — TELEPHONE ENCOUNTER
"Endoscopy Scheduling Screen      What insurance is in the chart?  Other:  patient just got insurance will be self pay for now and then employer insurance will back pay    Ordering/Referring Provider: Summer Zhu   (If ordering provider performs procedure, schedule with ordering provider unless otherwise instructed. )    BMI: There is no height or weight on file to calculate BMI.     Sedation Ordered  general anesthesia.   BMI<= 45 45 < BMI <= 48 48 < BMI < = 50  BMI > 50   No Restrictions No [facility]  No [facility]  [facility] with exceptions Hospital Only OR Only       Do you have a history of malignant hyperthermia?  No    (Females) Are you currently pregnant?   No     Have you been diagnosed or told you have pulmonary hypertension?   No    Do you have any heart conditions?  No     Do you have an LVAD?  No    Have you been told you have moderate to severe sleep apnea?  No    Have you been told you have COPD, asthma, or any other lung disease?  No    Have you ever had or are you waiting for an organ transplant?  No. Continue scheduling, no site restrictions.    Have you had a stroke or transient ischemic attack (TIA aka \"mini stroke\" in the last 6 months?   No    Have you been diagnosed with or been told you have cirrhosis of the liver?   No    Are you currently on dialysis?   No    Do you need assistance transferring?   No    BMI: There is no height or weight on file to calculate BMI.     Is patients BMI > 50?  No    BMI > 40?  No    Do you have a diagnosis of diabetes?  No    Do you take an injectable medication for weight loss or diabetes (excluding insulin)?  No    Do you take the medication Naltrexone?  No    Do you take blood thinners?  No     Prep   Are you currently on dialysis or do you have chronic kidney disease?  No    Do you have a diagnosis of cystic fibrosis (CF)?  No    On a regular basis do you go 3 -5 days between bowel movements?  No    Preferred [facility]acy:  [facility]  No Pharmacies " Listed    Final Scheduling Details     Procedure scheduled  Colonoscopy / Upper endoscopy (EGD)    Surgeon:  Autumn     Date of procedure:  8-30-24     Location  [place] - Patient preference.    What is your communication preference for Instructions and/or Bowel Prep?   [place]    Patient Reminders:    You will receive a call from a Nurse to review instructions and health history.  This assessment must be completed prior to your procedure.  Failure to complete the Nurse assessment may result in the procedure being cancelled.       On the day of your procedure, please designate an adult(s) who can drive you home stay with you for the next 24 hours. The medicines used in the exam will make you sleepy. You will not be able to drive.       You cannot take public transportation, ride share services, or non-medical taxi service without a responsible caregiver.  Medical transport services are allowed with the requirement that a responsible caregiver will receive you at your destination.  We require that drivers and caregivers are confirmed prior to your procedure.

## 2024-11-10 ENCOUNTER — HEALTH MAINTENANCE LETTER (OUTPATIENT)
Age: 34
End: 2024-11-10

## 2024-12-10 ENCOUNTER — TELEPHONE (OUTPATIENT)
Dept: FAMILY MEDICINE | Facility: CLINIC | Age: 34
End: 2024-12-10

## 2024-12-10 NOTE — LETTER
December 10, 2024    To  Cassie Morrison  [address] [place] [address] [id number]  [facility] [address]    Your team at [facility] cares about your health. We have reviewed your chart and based on our findings; we are making the following recommendations to better manage your health.     You are in particular need of attention regarding the following:     Schedule a primary care office visit with your provider for a Pap Smear to screen for Cervical Cancer.  PREVENTATIVE VISIT: Physical    If you have already completed these items, please contact the clinic via phone or   [facility] so your care team can review and update your records. Thank you for   choosing [facility] for your healthcare needs. For any questions,   concerns, or to schedule an appointment please contact our clinic.    Healthy Regards,      Your [facility] Care Team

## 2024-12-10 NOTE — TELEPHONE ENCOUNTER
Patient Quality Outreach    Patient is due for the following:   Cervical Cancer Screening - PAP Needed  Physical Preventive Adult Physical    Action(s) Taken:   Pt to schedule    Type of outreach:    Sent letter.    Questions for provider review:    None           Oliverio Toro

## 2025-04-12 ENCOUNTER — NURSE TRIAGE (OUTPATIENT)
Dept: NURSING | Facility: CLINIC | Age: 35
End: 2025-04-12

## 2025-04-12 NOTE — TELEPHONE ENCOUNTER
"Triage call  Patient calling she has been having  Abdominal pain in her vaginal area she states she did have sex with her boyfriend last night but is was not rough and nothing out of the ordinary  but she has been having abdominal pain and pressure,vaginal pain  for 5 hours she rates the pain a  10/10.  When she went to the bathroom the pain was worse but she states it was not her urethra.  She has tried soaking in the tube and is getting no relief.    Per protocol Go to the ED now.  Care advice given.  Verbalizes understanding and agrees with plan.    Vivian Rodriguez RN   [facility] Nurse Advisor  5:54 AM 4/12/2025     Reason for Disposition   [1] SEVERE pain (e.g., excruciating) AND [2] present > 1 hour    Additional Information   Negative: Shock suspected (e.g., cold/pale/clammy skin, too weak to stand, low BP, rapid pulse)   Negative: Difficult to awaken or acting confused (e.g., disoriented, slurred speech)   Negative: Passed out (i.e., lost consciousness, collapsed and was not responding)   Negative: Sounds like a life-threatening emergency to the triager   Negative: Followed an abdomen (stomach) injury   Negative: Chest pain   Negative: [1] Abdominal pain AND [2] pregnant < 20 weeks   Negative: [1] Abdominal pain AND [2] pregnant 20 or more weeks   Negative: [1] Abdominal pain AND [2] postpartum (from 0 to 6 weeks after delivery)   Negative: Pain is mainly in upper abdomen  (if needed ask: \"is it mainly above the belly button?\")   Negative: Abdomen bloating or swelling are main symptoms    Protocols used: Abdominal Pain - Female-A-AH    "

## 2025-07-02 ENCOUNTER — APPOINTMENT (OUTPATIENT)
Dept: CT IMAGING | Facility: HOSPITAL | Age: 35
End: 2025-07-02

## 2025-07-02 ENCOUNTER — HOSPITAL ENCOUNTER (EMERGENCY)
Facility: HOSPITAL | Age: 35
Discharge: HOME | End: 2025-07-02

## 2025-07-02 VITALS
HEART RATE: 60 BPM | TEMPERATURE: 100.8 F | DIASTOLIC BLOOD PRESSURE: 67 MMHG | BODY MASS INDEX: 20.76 KG/M2 | SYSTOLIC BLOOD PRESSURE: 113 MMHG | OXYGEN SATURATION: 100 % | WEIGHT: 137 LBS | RESPIRATION RATE: 18 BRPM | HEIGHT: 68 IN

## 2025-07-02 DIAGNOSIS — R10.31 RIGHT LOWER QUADRANT ABDOMINAL PAIN: ICD-10-CM

## 2025-07-02 DIAGNOSIS — M54.16 LUMBAR BACK PAIN WITH RADICULOPATHY AFFECTING RIGHT LOWER EXTREMITY: ICD-10-CM

## 2025-07-02 LAB
ALBUMIN SERPL BCG-MCNC: 4.1 G/DL (ref 3.5–5.2)
ALBUMIN UR-MCNC: NEGATIVE MG/DL
ALP SERPL-CCNC: 34 U/L (ref 40–150)
ALT SERPL W P-5'-P-CCNC: 16 U/L (ref 0–50)
ANION GAP SERPL CALCULATED.3IONS-SCNC: 9 MMOL/L (ref 7–15)
APPEARANCE UR: CLEAR
AST SERPL W P-5'-P-CCNC: 15 U/L (ref 0–45)
BASOPHILS # BLD AUTO: 0 10E3/UL (ref 0–0.2)
BASOPHILS NFR BLD AUTO: 0 %
BILIRUB DIRECT SERPL-MCNC: 0.11 MG/DL (ref 0–0.3)
BILIRUB SERPL-MCNC: 0.3 MG/DL
BILIRUB UR QL STRIP: NEGATIVE
BUN SERPL-MCNC: 11.9 MG/DL (ref 6–20)
CALCIUM SERPL-MCNC: 9.5 MG/DL (ref 8.8–10.4)
CHLORIDE SERPL-SCNC: 106 MMOL/L (ref 98–107)
COLOR UR AUTO: COLORLESS
CREAT SERPL-MCNC: 0.73 MG/DL (ref 0.51–0.95)
EGFRCR SERPLBLD CKD-EPI 2021: >90 ML/MIN/1.73M2
EOSINOPHIL # BLD AUTO: 0.1 10E3/UL (ref 0–0.7)
EOSINOPHIL NFR BLD AUTO: 1 %
ERYTHROCYTE [DISTWIDTH] IN BLOOD BY AUTOMATED COUNT: 12.4 % (ref 10–15)
GLUCOSE SERPL-MCNC: 97 MG/DL (ref 70–99)
GLUCOSE UR STRIP-MCNC: NEGATIVE MG/DL
HCO3 SERPL-SCNC: 25 MMOL/L (ref 22–29)
HCT VFR BLD AUTO: 36.2 % (ref 35–47)
HGB BLD-MCNC: 11.9 G/DL (ref 11.7–15.7)
HGB UR QL STRIP: NEGATIVE
HOLD SPECIMEN: NORMAL
IMM GRANULOCYTES # BLD: 0 10E3/UL
IMM GRANULOCYTES NFR BLD: 0 %
KETONES UR STRIP-MCNC: NEGATIVE MG/DL
LEUKOCYTE ESTERASE UR QL STRIP: NEGATIVE
LIPASE SERPL-CCNC: 23 U/L (ref 13–60)
LYMPHOCYTES # BLD AUTO: 1.7 10E3/UL (ref 0.8–5.3)
LYMPHOCYTES NFR BLD AUTO: 33 %
MCH RBC QN AUTO: 30.1 PG (ref 26.5–33)
MCHC RBC AUTO-ENTMCNC: 32.9 G/DL (ref 31.5–36.5)
MCV RBC AUTO: 92 FL (ref 78–100)
MONOCYTES # BLD AUTO: 0.4 10E3/UL (ref 0–1.3)
MONOCYTES NFR BLD AUTO: 8 %
NEUTROPHILS # BLD AUTO: 3 10E3/UL (ref 1.6–8.3)
NEUTROPHILS NFR BLD AUTO: 58 %
NITRATE UR QL: NEGATIVE
NRBC # BLD AUTO: 0 10E3/UL
NRBC BLD AUTO-RTO: 0 /100
PH UR STRIP: 7 [PH] (ref 5–7)
PLATELET # BLD AUTO: 194 10E3/UL (ref 150–450)
POTASSIUM SERPL-SCNC: 3.9 MMOL/L (ref 3.4–5.3)
PROT SERPL-MCNC: 7.6 G/DL (ref 6.4–8.3)
RBC # BLD AUTO: 3.95 10E6/UL (ref 3.8–5.2)
RBC URINE: <1 /HPF
SODIUM SERPL-SCNC: 140 MMOL/L (ref 135–145)
SP GR UR STRIP: 1.01 (ref 1–1.03)
UROBILINOGEN UR STRIP-MCNC: NORMAL MG/DL
WBC # BLD AUTO: 5.1 10E3/UL (ref 4–11)
WBC URINE: <1 /HPF

## 2025-07-02 PROCEDURE — 82248 BILIRUBIN DIRECT: CPT

## 2025-07-02 PROCEDURE — 83690 ASSAY OF LIPASE: CPT

## 2025-07-02 PROCEDURE — 81001 URINALYSIS AUTO W/SCOPE: CPT

## 2025-07-02 PROCEDURE — 96374 THER/PROPH/DIAG INJ IV PUSH: CPT | Mod: 59

## 2025-07-02 PROCEDURE — 250N000011 HC RX IP 250 OP 636

## 2025-07-02 PROCEDURE — 85004 AUTOMATED DIFF WBC COUNT: CPT

## 2025-07-02 PROCEDURE — 81003 URINALYSIS AUTO W/O SCOPE: CPT

## 2025-07-02 PROCEDURE — 84295 ASSAY OF SERUM SODIUM: CPT

## 2025-07-02 PROCEDURE — 80048 BASIC METABOLIC PNL TOTAL CA: CPT

## 2025-07-02 PROCEDURE — 74177 CT ABD & PELVIS W/CONTRAST: CPT

## 2025-07-02 PROCEDURE — 99285 EMERGENCY DEPT VISIT HI MDM: CPT | Mod: 25

## 2025-07-02 PROCEDURE — 36415 COLL VENOUS BLD VENIPUNCTURE: CPT

## 2025-07-02 RX ORDER — HYDROCODONE BITARTRATE AND ACETAMINOPHEN 5; 325 MG/1; MG/1
1 TABLET ORAL EVERY 6 HOURS PRN
Qty: 10 TABLET | Refills: 0 | Status: SHIPPED | OUTPATIENT
Start: 2025-07-02 | End: 2025-07-05

## 2025-07-02 RX ORDER — HYDROMORPHONE HYDROCHLORIDE 1 MG/ML
0.5 INJECTION, SOLUTION INTRAMUSCULAR; INTRAVENOUS; SUBCUTANEOUS ONCE
Status: COMPLETED | OUTPATIENT
Start: 2025-07-02 | End: 2025-07-02

## 2025-07-02 RX ORDER — ONDANSETRON 4 MG/1
4 TABLET, ORALLY DISINTEGRATING ORAL EVERY 8 HOURS PRN
Qty: 10 TABLET | Refills: 0 | Status: SHIPPED | OUTPATIENT
Start: 2025-07-02 | End: 2025-07-05

## 2025-07-02 RX ORDER — IOPAMIDOL 755 MG/ML
80 INJECTION, SOLUTION INTRAVASCULAR ONCE
Status: COMPLETED | OUTPATIENT
Start: 2025-07-02 | End: 2025-07-02

## 2025-07-02 RX ADMIN — HYDROMORPHONE HYDROCHLORIDE 0.5 MG: 1 INJECTION, SOLUTION INTRAMUSCULAR; INTRAVENOUS; SUBCUTANEOUS at 17:36

## 2025-07-02 RX ADMIN — IOPAMIDOL 80 ML: 755 INJECTION, SOLUTION INTRAVENOUS at 18:38

## 2025-07-02 ASSESSMENT — COLUMBIA-SUICIDE SEVERITY RATING SCALE - C-SSRS
1. IN THE PAST MONTH, HAVE YOU WISHED YOU WERE DEAD OR WISHED YOU COULD GO TO SLEEP AND NOT WAKE UP?: NO
2. HAVE YOU ACTUALLY HAD ANY THOUGHTS OF KILLING YOURSELF IN THE PAST MONTH?: NO
6. HAVE YOU EVER DONE ANYTHING, STARTED TO DO ANYTHING, OR PREPARED TO DO ANYTHING TO END YOUR LIFE?: NO

## 2025-07-02 ASSESSMENT — ACTIVITIES OF DAILY LIVING (ADL)
ADLS_ACUITY_SCORE: 45

## 2025-07-02 NOTE — ED PROVIDER NOTES
Emergency Department Encounter   NAME: Cassie Morrison ; AGE: 34 year old female ; YOB: 1990 ; MRN: [id number] ; PCP: Merna Yarbrough   ED PROVIDER: Zenia Wing PA-C    Evaluation Date & Time:   7/2/2025  4:55 PM    CHIEF COMPLAINT:  Back Pain      Impression and Plan   MDM: Cassie Morrison is a 34 year old female who presents to the ED for evaluation of back pain.  Patient has had right flank pain radiating to her right lower quadrant, right groin, and down her right leg for the past 3 days.  She describes it as 9-10/10 in intensity.  Pain is mostly in the right buttocks.  Denies any injury or fall.  She has tried Tylenol, ibuprofen, baclofen, ice, heat, and lidocaine patches with no relief.  She denies any BLE numbness or tingling besides a brief 10-minute episode where she felt tingling in her legs and it was difficult to move them.  No loss of bowel or bladder function.  Endorses urinary frequency but denies dysuria.  No vomiting or diarrhea.  Known herniated disc at L4.    Vitals reviewed and patient has a mildly elevated temperature at 100.8  F.  Not tachycardic or tachypneic, and satting well on room air.  Normotensive. On exam abdomen is soft and tender to palpation, specifically in right lower quadrant.  No midline tenderness to palpation of lumbar spine.  No sensory deficits or weakness of bilateral lower extremities. Differential diagnosis/emergent conditions considered and evaluated for includes but not limited to cauda equina, appendicitis, diverticulitis, pancreatitis, pyelonephritis, nephrolithiasis, lumbar back pain with sciatica.     Patient has significant tenderness to palpation of her abdomen, so CT ordered for further evaluation of this.  It does not show any acute, emergent cause of her pain.  BMP negative for any electrolyte abnormalities, and lipase normal.  Hepatic function panel unremarkable.  UA negative for any infection.  No leukocytosis to indicate a systemic  bacterial infection on CBC, and hemoglobin is normal, so low concern for occult GI bleed and blood loss.  Patient states the Dilaudid did help her pain for short time but it returned.  I discussed that I have ruled out several emergent causes of her abdominal pain, and I believe her pain is likely due to sciatica/radicular pain from her known disc herniation.  No concern for fracture to warrant CT lumbar spine, as she has no midline tenderness and reports no injury or fall.  No concern for cauda equina, she has no red flag symptoms besides a brief period of bilateral lower extremity tingling.  Neuroexam is benign today, which is also reassuring.  I attached the information for [facility] spine to her discharge paperwork and recommended that she call them to schedule appointment for further evaluation of her lumbar spine pain with sciatica.  I recommended they return to the ER if she has any lower extremity numbness, tingling, saddle anesthesia, or loss of bowel or bladder control.  Since she has tried several conservative treatments with no relief, I did prescribe her a short course of Norco for home as well as Zofran for nausea.  Patient verbalized understanding of this plan and is okay for discharge.    ED COURSE:  5:10 PM I met and introduced myself to the patient. I gathered initial history and performed my physical exam. We discussed plan for initial workup. Family member at bedside.    7:49 PM I rechecked the patient and discussed results, discharge, follow up, and reasons to return to the ED.     At the conclusion of the encounter I discussed the results of all the tests and the disposition. The questions were answered. The patient or family acknowledged understanding and was agreeable with the care plan.    Medical Decision Making  I obtained history from Family Member/Significant Other  Discharge. I prescribed additional prescription strength medication(s) as charted. See documentation for any  additional details.  Admission considered, but lab results were reassuring that patient's condition will be safely managed at home.    MIPS (CTPE, Dental pain, Low, Sinusitis, Asthma/COPD, Head Trauma): Not Applicable    SEPSIS: None        FINAL IMPRESSION:    ICD-10-CM    1. Lumbar back pain with radiculopathy affecting right lower extremity  M54.16       2. Right lower quadrant abdominal pain  R10.31             MEDICATIONS GIVEN IN THE EMERGENCY DEPARTMENT:  Medications   HYDROmorphone (PF) (DILAUDID) injection 0.5 mg (0.5 mg Intravenous $Given 7/2/25 7526)   iopamidol (ISOVUE-370) solution 80 mL (80 mLs Intravenous $Given 7/2/25 9463)         NEW PRESCRIPTIONS STARTED AT TODAY'S ED VISIT:  New Prescriptions    HYDROCODONE-ACETAMINOPHEN (NORCO) 5-325 MG TABLET    Take 1 tablet by mouth every 6 hours as needed for severe pain.    ONDANSETRON (ZOFRAN ODT) 4 MG ODT TAB    Take 1 tablet (4 mg) by mouth every 8 hours as needed for nausea.         HPI   Use of Intrepreter: N/A     [place] STEPHENIE Morrison is a 34 year old female with a pertinent history of endometriosis, kidney stone, pelvic inflammatory disease,  who presents to the ED for evaluation of back pain.     The patient tells me that she had an onset of right flank pain 3 days ago that has been progressively getting worse. The pain wrapped around her right side to her right groin / right lower abdomen. It is most intense deeply in her right buttock / right lower abdomen. Pain rated 9 - 10 out of 10. She has tried home care remedies for her pain, notably compression wraps, heat/ice, ointments, Tylenol, and ibuprofen without palliation. Notes that when removing her leggings to use the bathroom she experiences a sharp pain at her right lower abdomen before it goes away. She has a history of a herniated disc. Endorses increasing urinary frequency. She had a fever on arrival, prior to arrival no measured fevers. Denies recent falls, trauma, or heavy lifting. No  dysuria, itching, hematuria, diarrhea, bloody stool, incontinence, or constipation.  No numbness / tingling in her legs. No neck stiffness, no headaches. Her last bowel movement was this morning.     Patient adamantly expressed that she does not want to have a spinal tap performed.     Per chart review [facility] Emergency Department 2025 dyspareunia. Given oxycodone and Toradol for pain management in ED with improvement. Discharged with Toradol.     REVIEW OF SYSTEMS:  Pertinent positive and negative symptoms per HPI.       Medical History     Past Medical History:   Diagnosis Date    Abnormal Pap smear of cervix     Allergic     Anemia     Anxiety     Chickenpox     Depression     Endometriosis     Endometriosis     Endometriosis     Heart murmur     IUD migration     Kidney stone     Malignant hyperthermia due to anesthesia     Migraine     Papanicolaou smear of cervix with low grade squamous intraepithelial lesion (LGSIL) 12    PID (pelvic inflammatory disease)     Postpartum depression     Suicidal ideation 2015    Urinary tract infection     Varicella        Past Surgical History:   Procedure Laterality Date     SECTION N/A 2016    Procedure:  SECTION;  Surgeon: Reno Alamo MD;  Location: [facility] L[facility]D OR;  Service:     COLONOSCOPY      COLPOSCOPY      DILATION AND CURETTAGE SUCTION, TREAT INCOMPLETE       D&E    DILATION AND CURETTAGE, OPERATIVE HYSTEROSCOPY, COMBINED N/A 2015    D&C HSC, hysterectomy-2018    ENDOMETRIAL ABLATION      during one of the laparotomies    HC LAPAROSCOPIC IUD REMOVAL  2015    LAPAROSCOPIC HYSTERECTOMY N/A 2018    Procedure: ROBOTC TOTAL LAPAROSCOPIC HYSTERECTOMY, BILATERAL SALPINGECTOMY ;  Surgeon: Reno Alamo MD;  [place] [facility] [place];  Service:     LAPAROSCOPY  2010    endometriosis    LAPAROTOMY EXPLORATORY      3x - last in 2015, , Gyne surgeon in   states no contraindication to vaginal birth per patient    LASER CO2 LAPAROSCOPIC VAPORIZATION ENDOMETRIUM N/A 12/24/2015    vaporization of endometriosis    OTHER SURGICAL HISTORY  12/24/2015    IUD removallaproscopic procedure    ME LAP,FULGURATE/EXCISE LESIONS Right 5/5/2017    Procedure: LAPAROSCOPY, OVARIAN CYSTECTOMY;  Surgeon: Reno Alamo MD;  Location: [facility] OR;  Service: Gynecology    Z ORAL SURGERY PROCEDURE      wisdom teeth x 5       Family History   Problem Relation Age of Onset    Blood Disease Paternal Grandfather         anemia    Cancer Paternal Grandfather     Diabetes Paternal Grandmother     Cerebrovascular Disease Father     Cerebrovascular Disease Paternal Grandfather     Depression Mother     Alcohol/Drug Mother         meth    Alcohol/Drug Father         drugs    Arthritis Mother     Substance Abuse Mother     Mental Illness Mother     Early Death Father     Mental Illness Sister         ADHD and anger issues    Alcoholism Maternal Aunt     Substance Abuse Maternal Aunt     Hypertension Maternal Aunt     Kidney Disease Maternal Aunt     Early Death Paternal Uncle     Cerebrovascular Disease Paternal Uncle     Heart Disease Maternal Grandmother     Hearing Loss Maternal Grandmother     Arthritis Maternal Grandmother     Dementia Maternal Grandfather     Hypertension Maternal Grandfather     Hypertension Paternal Grandmother     Arthritis Paternal Grandfather     Asthma Paternal Grandfather     Depression Paternal Grandfather     Hearing Loss Paternal Grandfather        Social History     Tobacco Use    Smoking status: Former     Types: Cigarettes    Smokeless tobacco: Never    Tobacco comments:     smoking 10cig/day- down to 1-2 with pregnancy   Vaping Use    Vaping status: Some Days    Substances: Nicotine, Flavoring    Devices: Disposable   Substance Use Topics    Alcohol use: Yes     Alcohol/week: 0.0 standard drinks of alcohol     Comment: 3 times a month    Drug use: No  "      Ferrous Gluconate 324 (37.5 Fe) MG TABS  HYDROcodone-acetaminophen (NORCO) 5-325 MG tablet  ondansetron (ZOFRAN ODT) 4 MG ODT tab          Physical Exam     First Vitals:  Patient Vitals for the past 24 hrs:   BP Temp Temp src Pulse Resp SpO2 Height Weight   07/02/25 1815 113/67 -- -- 60 -- 100 % -- --   07/02/25 1800 110/64 -- -- 66 -- 99 % -- --   07/02/25 1745 101/57 -- -- 64 -- 98 % -- --   07/02/25 1642 121/74 100.8  F (38.2  C) Temporal 81 18 98 % 1.727 m (5' 8\") 62.1 kg (137 lb)         PHYSICAL EXAM:   Physical Exam  Vitals reviewed.   Constitutional:       General: She is not in acute distress.     Appearance: Normal appearance. She is not diaphoretic.   HENT:      Head: Atraumatic.      Mouth/Throat:      Mouth: Mucous membranes are moist.   Eyes:      General: No scleral icterus.     Conjunctiva/sclera: Conjunctivae normal.   Cardiovascular:      Rate and Rhythm: Normal rate.      Heart sounds: Normal heart sounds.   Pulmonary:      Effort: No respiratory distress.      Breath sounds: Normal breath sounds.   Abdominal:      General: Abdomen is flat.      Tenderness: There is abdominal tenderness (Generalized tenderness, specifically right lower quadrant).   Musculoskeletal:         General: No tenderness (No midline tenderness lumbar spine). Normal range of motion.      Cervical back: Neck supple.   Skin:     General: Skin is warm.      Findings: No rash.   Neurological:      General: No focal deficit present.      Mental Status: She is alert and oriented to person, place, and time.      Sensory: No sensory deficit.      Motor: No weakness.             Results     LAB:  All pertinent labs reviewed and interpreted  Labs Ordered and Resulted from Time of ED Arrival to Time of ED Departure   HEPATIC FUNCTION PANEL - Abnormal       Result Value    Protein Total 7.6      Albumin 4.1      Bilirubin Total 0.3      Alkaline Phosphatase 34 (*)     AST 15      ALT 16      Bilirubin Direct 0.11     BASIC " METABOLIC PANEL - Normal    Sodium 140      Potassium 3.9      Chloride 106      Carbon Dioxide (CO2) 25      Anion Gap 9      Urea Nitrogen 11.9      Creatinine 0.73      GFR Estimate >90      Calcium 9.5      Glucose 97     LIPASE - Normal    Lipase 23     ROUTINE UA WITH MICROSCOPIC REFLEX TO CULTURE - Normal    Color Urine Colorless      Appearance Urine Clear      Glucose Urine Negative      Bilirubin Urine Negative      Ketones Urine Negative      Specific Gravity Urine 1.006      Blood Urine Negative      pH Urine 7.0      Protein Albumin Urine Negative      Urobilinogen Urine Normal      Nitrite Urine Negative      Leukocyte Esterase Urine Negative      RBC Urine <1      WBC Urine <1     CBC WITH PLATELETS AND DIFFERENTIAL    WBC Count 5.1      RBC Count 3.95      Hemoglobin 11.9      Hematocrit 36.2      MCV 92      MCH 30.1      MCHC 32.9      RDW 12.4      Platelet Count 194      % Neutrophils 58      % Lymphocytes 33      % Monocytes 8      % Eosinophils 1      % Basophils 0      % Immature Granulocytes 0      NRBCs per 100 WBC 0      Absolute Neutrophils 3.0      Absolute Lymphocytes 1.7      Absolute Monocytes 0.4      Absolute Eosinophils 0.1      Absolute Basophils 0.0      Absolute Immature Granulocytes 0.0      Absolute NRBCs 0.0         RADIOLOGY:  CT Abdomen Pelvis w Contrast   Final Result   IMPRESSION:       No focal intra-abdominal inflammatory process.          PROCEDURES:  none      I, Nick Puga, am serving as a scribe to document services personally performed by Zenia Wing PA-C, based on my observation and the provider's statements to me. I, Zenia Wing PA-C attest that Nick Puga is acting in a scribe capacity, has observed my performance of the services and has documented them in accordance with my direction.       Zenia Wing PA-C   Emergency Medicine   [facility] EMERGENCY DEPARTMENT      Zenia Wing PA-C  07/02/25 1951

## 2025-07-02 NOTE — ED TRIAGE NOTES
Pt ambulatory to triage with c/o lower back pain x3 days that radiates to RLQ.  Still has appendix.  No meds taken today.  Denies any trauma.      Triage Assessment (Adult)       Row Name 07/02/25 3758          Triage Assessment    Airway WDL WDL        Respiratory WDL    Respiratory WDL WDL        Skin Circulation/Temperature WDL    Skin Circulation/Temperature WDL WDL        Cardiac WDL    Cardiac WDL WDL        Peripheral/Neurovascular WDL    Peripheral Neurovascular WDL WDL        Cognitive/Neuro/Behavioral WDL    Cognitive/Neuro/Behavioral WDL WDL

## 2025-07-03 NOTE — DISCHARGE INSTRUCTIONS
Your emergency department evaluation is reassuring, and we have ruled out several emergent abdominal conditions.  I have attached information for [facility] spine center, so I recommend calling them to schedule an appointment for further evaluation of your back pain.  I have prescribed a short course of Norco to take as needed for your pain as well as Zofran to help with the nausea.  I also recommend continuing your conservative treatments at home.  If you notice any lower extremity weakness, numbness, groin numbness, or loss of control of bowel or bladder, please return to the ER immediately for further evaluation.

## 2025-07-26 ENCOUNTER — APPOINTMENT (OUTPATIENT)
Dept: CT IMAGING | Facility: HOSPITAL | Age: 35
End: 2025-07-26
Attending: EMERGENCY MEDICINE

## 2025-07-26 ENCOUNTER — APPOINTMENT (OUTPATIENT)
Dept: ULTRASOUND IMAGING | Facility: HOSPITAL | Age: 35
End: 2025-07-26
Attending: EMERGENCY MEDICINE

## 2025-07-26 ENCOUNTER — HOSPITAL ENCOUNTER (EMERGENCY)
Facility: HOSPITAL | Age: 35
Discharge: HOME | End: 2025-07-26
Attending: EMERGENCY MEDICINE | Admitting: EMERGENCY MEDICINE

## 2025-07-26 VITALS
WEIGHT: 129 LBS | HEART RATE: 72 BPM | SYSTOLIC BLOOD PRESSURE: 106 MMHG | DIASTOLIC BLOOD PRESSURE: 55 MMHG | OXYGEN SATURATION: 100 % | HEIGHT: 68 IN | TEMPERATURE: 96.8 F | RESPIRATION RATE: 19 BRPM | BODY MASS INDEX: 19.55 KG/M2

## 2025-07-26 DIAGNOSIS — R11.2 NAUSEA AND VOMITING, UNSPECIFIED VOMITING TYPE: ICD-10-CM

## 2025-07-26 DIAGNOSIS — R10.13 EPIGASTRIC PAIN: Primary | ICD-10-CM

## 2025-07-26 DIAGNOSIS — R11.2 CANNABINOID HYPEREMESIS SYNDROME: ICD-10-CM

## 2025-07-26 DIAGNOSIS — Z78.9 ALCOHOL INGESTION: ICD-10-CM

## 2025-07-26 DIAGNOSIS — F12.90 CANNABINOID HYPEREMESIS SYNDROME: ICD-10-CM

## 2025-07-26 DIAGNOSIS — R29.0 CARPOPEDAL SPASM: ICD-10-CM

## 2025-07-26 LAB
ALBUMIN SERPL BCG-MCNC: 4.5 G/DL (ref 3.5–5.2)
ALP SERPL-CCNC: 35 U/L (ref 40–150)
ALT SERPL W P-5'-P-CCNC: 22 U/L (ref 0–50)
AMPHETAMINES UR QL SCN: ABNORMAL
ANION GAP SERPL CALCULATED.3IONS-SCNC: 20 MMOL/L (ref 7–15)
AST SERPL W P-5'-P-CCNC: 23 U/L (ref 0–45)
B-OH-BUTYR SERPL-SCNC: <0.18 MMOL/L
BARBITURATES UR QL SCN: ABNORMAL
BENZODIAZ UR QL SCN: ABNORMAL
BILIRUB DIRECT SERPL-MCNC: 0.11 MG/DL (ref 0–0.3)
BILIRUB SERPL-MCNC: 0.6 MG/DL
BUN SERPL-MCNC: 11.3 MG/DL (ref 6–20)
BZE UR QL SCN: ABNORMAL
CALCIUM SERPL-MCNC: 9.5 MG/DL (ref 8.8–10.4)
CANNABINOIDS UR QL SCN: ABNORMAL
CHLORIDE SERPL-SCNC: 108 MMOL/L (ref 98–107)
CREAT SERPL-MCNC: 0.67 MG/DL (ref 0.51–0.95)
EGFRCR SERPLBLD CKD-EPI 2021: >90 ML/MIN/1.73M2
FENTANYL UR QL: ABNORMAL
GLUCOSE SERPL-MCNC: 131 MG/DL (ref 70–99)
HCG SERPL QL: NEGATIVE
HCO3 SERPL-SCNC: 15 MMOL/L (ref 22–29)
HOLD SPECIMEN: NORMAL
HOLD SPECIMEN: NORMAL
LACTATE SERPL-SCNC: 3.2 MMOL/L (ref 0.7–2)
LACTATE SERPL-SCNC: 6.1 MMOL/L (ref 0.7–2)
LIPASE SERPL-CCNC: 19 U/L (ref 13–60)
OPIATES UR QL SCN: ABNORMAL
PCP QUAL URINE (ROCHE): ABNORMAL
POTASSIUM SERPL-SCNC: 3.4 MMOL/L (ref 3.4–5.3)
PROT SERPL-MCNC: 8.2 G/DL (ref 6.4–8.3)
SODIUM SERPL-SCNC: 143 MMOL/L (ref 135–145)

## 2025-07-26 PROCEDURE — 83605 ASSAY OF LACTIC ACID: CPT | Performed by: EMERGENCY MEDICINE

## 2025-07-26 PROCEDURE — 84703 CHORIONIC GONADOTROPIN ASSAY: CPT | Performed by: EMERGENCY MEDICINE

## 2025-07-26 PROCEDURE — 96374 THER/PROPH/DIAG INJ IV PUSH: CPT

## 2025-07-26 PROCEDURE — 96361 HYDRATE IV INFUSION ADD-ON: CPT

## 2025-07-26 PROCEDURE — 258N000003 HC RX IP 258 OP 636: Performed by: EMERGENCY MEDICINE

## 2025-07-26 PROCEDURE — 83690 ASSAY OF LIPASE: CPT | Performed by: EMERGENCY MEDICINE

## 2025-07-26 PROCEDURE — 250N000011 HC RX IP 250 OP 636: Performed by: EMERGENCY MEDICINE

## 2025-07-26 PROCEDURE — 80076 HEPATIC FUNCTION PANEL: CPT | Performed by: EMERGENCY MEDICINE

## 2025-07-26 PROCEDURE — 96375 TX/PRO/DX INJ NEW DRUG ADDON: CPT

## 2025-07-26 PROCEDURE — 99285 EMERGENCY DEPT VISIT HI MDM: CPT | Mod: 25 | Performed by: EMERGENCY MEDICINE

## 2025-07-26 PROCEDURE — 82010 KETONE BODYS QUAN: CPT | Performed by: EMERGENCY MEDICINE

## 2025-07-26 PROCEDURE — 250N000011 HC RX IP 250 OP 636: Mod: JW | Performed by: EMERGENCY MEDICINE

## 2025-07-26 PROCEDURE — 96372 THER/PROPH/DIAG INJ SC/IM: CPT | Performed by: EMERGENCY MEDICINE

## 2025-07-26 PROCEDURE — 96376 TX/PRO/DX INJ SAME DRUG ADON: CPT

## 2025-07-26 PROCEDURE — 93005 ELECTROCARDIOGRAM TRACING: CPT | Performed by: EMERGENCY MEDICINE

## 2025-07-26 PROCEDURE — 36415 COLL VENOUS BLD VENIPUNCTURE: CPT | Performed by: EMERGENCY MEDICINE

## 2025-07-26 PROCEDURE — 74177 CT ABD & PELVIS W/CONTRAST: CPT

## 2025-07-26 PROCEDURE — 80307 DRUG TEST PRSMV CHEM ANLYZR: CPT | Performed by: EMERGENCY MEDICINE

## 2025-07-26 PROCEDURE — 76705 ECHO EXAM OF ABDOMEN: CPT

## 2025-07-26 RX ORDER — FENTANYL CITRATE 50 UG/ML
50 INJECTION, SOLUTION INTRAMUSCULAR; INTRAVENOUS ONCE
Status: COMPLETED | OUTPATIENT
Start: 2025-07-26 | End: 2025-07-26

## 2025-07-26 RX ORDER — PROMETHAZINE HYDROCHLORIDE 25 MG/ML
12.5 INJECTION INTRAMUSCULAR; INTRAVENOUS ONCE
Status: COMPLETED | OUTPATIENT
Start: 2025-07-26 | End: 2025-07-26

## 2025-07-26 RX ORDER — PROMETHAZINE HYDROCHLORIDE 25 MG/1
25 TABLET ORAL EVERY 6 HOURS PRN
Qty: 8 TABLET | Refills: 0 | Status: SHIPPED | OUTPATIENT
Start: 2025-07-26 | End: 2025-07-26

## 2025-07-26 RX ORDER — DIAZEPAM 10 MG/2ML
5 INJECTION, SOLUTION INTRAMUSCULAR; INTRAVENOUS ONCE
Status: COMPLETED | OUTPATIENT
Start: 2025-07-26 | End: 2025-07-26

## 2025-07-26 RX ORDER — DIAZEPAM 5 MG/1
5 TABLET ORAL EVERY 6 HOURS PRN
Qty: 15 TABLET | Refills: 0 | Status: SHIPPED | OUTPATIENT
Start: 2025-07-26

## 2025-07-26 RX ORDER — ONDANSETRON 2 MG/ML
4 INJECTION INTRAMUSCULAR; INTRAVENOUS EVERY 30 MIN PRN
Status: DISCONTINUED | OUTPATIENT
Start: 2025-07-26 | End: 2025-07-26 | Stop reason: HOSPADM

## 2025-07-26 RX ORDER — KETOROLAC TROMETHAMINE 15 MG/ML
15 INJECTION, SOLUTION INTRAMUSCULAR; INTRAVENOUS ONCE
Status: COMPLETED | OUTPATIENT
Start: 2025-07-26 | End: 2025-07-26

## 2025-07-26 RX ORDER — PROMETHAZINE HYDROCHLORIDE 25 MG/1
25 TABLET ORAL EVERY 6 HOURS PRN
Qty: 8 TABLET | Refills: 0 | Status: SHIPPED | OUTPATIENT
Start: 2025-07-26

## 2025-07-26 RX ORDER — DIPHENHYDRAMINE HYDROCHLORIDE 50 MG/ML
12.5 INJECTION, SOLUTION INTRAMUSCULAR; INTRAVENOUS ONCE
Status: COMPLETED | OUTPATIENT
Start: 2025-07-26 | End: 2025-07-26

## 2025-07-26 RX ORDER — MAGNESIUM HYDROXIDE/ALUMINUM HYDROXICE/SIMETHICONE 120; 1200; 1200 MG/30ML; MG/30ML; MG/30ML
15 SUSPENSION ORAL ONCE
Status: COMPLETED | OUTPATIENT
Start: 2025-07-26 | End: 2025-07-26

## 2025-07-26 RX ORDER — FENTANYL CITRATE 50 UG/ML
50 INJECTION, SOLUTION INTRAMUSCULAR; INTRAVENOUS ONCE
Refills: 0 | Status: COMPLETED | OUTPATIENT
Start: 2025-07-26 | End: 2025-07-26

## 2025-07-26 RX ORDER — ACETAMINOPHEN 325 MG/1
650 TABLET ORAL ONCE
Status: COMPLETED | OUTPATIENT
Start: 2025-07-26 | End: 2025-07-26

## 2025-07-26 RX ORDER — DIAZEPAM 5 MG/1
5 TABLET ORAL EVERY 6 HOURS PRN
Qty: 15 TABLET | Refills: 0 | Status: SHIPPED | OUTPATIENT
Start: 2025-07-26 | End: 2025-07-26

## 2025-07-26 RX ORDER — IOPAMIDOL 755 MG/ML
64 INJECTION, SOLUTION INTRAVASCULAR ONCE
Status: COMPLETED | OUTPATIENT
Start: 2025-07-26 | End: 2025-07-26

## 2025-07-26 RX ADMIN — DIPHENHYDRAMINE HYDROCHLORIDE 12.5 MG: 50 INJECTION, SOLUTION INTRAMUSCULAR; INTRAVENOUS at 13:15

## 2025-07-26 RX ADMIN — KETOROLAC TROMETHAMINE 15 MG: 15 INJECTION, SOLUTION INTRAMUSCULAR; INTRAVENOUS at 09:03

## 2025-07-26 RX ADMIN — DIAZEPAM 5 MG: 10 INJECTION, SOLUTION INTRAMUSCULAR; INTRAVENOUS at 08:17

## 2025-07-26 RX ADMIN — ONDANSETRON 4 MG: 2 INJECTION, SOLUTION INTRAMUSCULAR; INTRAVENOUS at 11:11

## 2025-07-26 RX ADMIN — DIAZEPAM 5 MG: 10 INJECTION, SOLUTION INTRAMUSCULAR; INTRAVENOUS at 13:16

## 2025-07-26 RX ADMIN — FENTANYL CITRATE 50 MCG: 50 INJECTION, SOLUTION INTRAMUSCULAR; INTRAVENOUS at 11:48

## 2025-07-26 RX ADMIN — FAMOTIDINE 20 MG: 10 INJECTION, SOLUTION INTRAVENOUS at 08:16

## 2025-07-26 RX ADMIN — SODIUM CHLORIDE 1000 ML: 0.9 INJECTION, SOLUTION INTRAVENOUS at 09:48

## 2025-07-26 RX ADMIN — FENTANYL CITRATE 50 MCG: 50 INJECTION, SOLUTION INTRAMUSCULAR; INTRAVENOUS at 09:43

## 2025-07-26 RX ADMIN — ONDANSETRON 4 MG: 2 INJECTION, SOLUTION INTRAMUSCULAR; INTRAVENOUS at 08:36

## 2025-07-26 RX ADMIN — SODIUM CHLORIDE 1000 ML: 0.9 INJECTION, SOLUTION INTRAVENOUS at 08:21

## 2025-07-26 RX ADMIN — DIAZEPAM 5 MG: 10 INJECTION, SOLUTION INTRAMUSCULAR; INTRAVENOUS at 09:11

## 2025-07-26 RX ADMIN — IOPAMIDOL 64 ML: 755 INJECTION, SOLUTION INTRAVENOUS at 11:03

## 2025-07-26 RX ADMIN — PROMETHAZINE HYDROCHLORIDE 12.5 MG: 25 INJECTION INTRAMUSCULAR; INTRAVENOUS at 13:14

## 2025-07-26 ASSESSMENT — ENCOUNTER SYMPTOMS
ABDOMINAL PAIN: 1
VOMITING: 1
NAUSEA: 1

## 2025-07-26 ASSESSMENT — ACTIVITIES OF DAILY LIVING (ADL)
ADLS_ACUITY_SCORE: 45

## 2025-07-26 NOTE — ED PROVIDER NOTES
EMERGENCY DEPARTMENT ENCOUNTER       ED Course & Medical Decision Making     I saw and examined the patient.  IV was established and she was placed on the monitor.  She is given IV fluids, Toradol, Zofran.  Diagnostics ordered.    She was noted to be bradycardic on the monitor and we got an EKG.  I did independently interpret EKG done today at 9:27 AM.  Sinus bradycardia with a rate of 37 bpm.  Intervals and axis are normal.  No significant ST elevation or depression.  No pathological Q waves.  Compared to previous from November 3, 2023 rate is decreased and QT is increasing    She does have a lactic acidosis of 6.1 which is contributing to an anion gap.  Ketones are okay.  Liver enzymes and lipase are okay.    Ultrasound is normal but she does have a lactic acidosis.  Likely just because of her vomiting though mesenteric ischemia, bowel obstruction are possible.  She will be sent for a CT scan as well and she will receive a second liter of normal saline.        No acute process or obstruction on CT.  Her lactic acidosis is clearing after fluids and her vomiting has subsided.  Her urine drug screen is positive for cannabinoids which likely is contributing to her presentation and symptoms.    She received some further Valium, Phenergan and Benadryl and she is doing better here.  She will be discharged with prescription for Valium and Phenergan and she can follow-up with primary care.  She is to avoid drugs and alcohol going forward.    Sepsis: No sign of sepsis    7:58 AM I met with the patient, obtained history, performed an initial exam, and discussed options and plan for diagnostics and treatment here in the ED.  9:10 AM Lab called with a critical lab result. The patient's lactic acid level came back elevated at 6.1.    Medical Decision Making  Obtained supplemental history: Patient and patient's significant other  Reviewed external records: ED visit 10/25/2024  Care impacted by chronic illness: cannabis use  disorder, chronic midline low back pain, bipolar 2 disorder, endometriosis, pelvic inflammatory disease, depression, anxiety, and Crohn's disease  Care significantly affected by social determinants of health:See MDM  Did you consider but not order tests?:See MDM  Did you interpret images independently?:  Any ordered images or EKGs were reviewed and independently interpreted  Consultation discussion with other provider:See MDM  Admit v. Discharge: See MDM      MIPS: Not Applicable       Prior to making a final disposition on this patient the results of patient's tests and other diagnostic studies were discussed with the patient. All questions were answered. Patient expressed understanding of the plan and was amenable to it.    Medications   ondansetron (ZOFRAN) injection 4 mg (4 mg Intravenous $Given 7/26/25 1111)   diazepam (VALIUM) injection 5 mg (has no administration in time range)   promethazine (PHENERGAN) intraMUSCULAR injection 12.5 mg (has no administration in time range)   diphenhydrAMINE (BENADRYL) injection 12.5 mg (has no administration in time range)   sodium chloride 0.9% BOLUS 1,000 mL (0 mLs Intravenous Stopped 7/26/25 0935)   famotidine (PEPCID) injection 20 mg (20 mg Intravenous $Given 7/26/25 0816)   alum & mag hydroxide-simethicone (MAALOX) suspension 15 mL (15 mLs Oral Not Given 7/26/25 1108)   diazepam (VALIUM) injection 5 mg (5 mg Intravenous $Given 7/26/25 0817)   diazepam (VALIUM) injection 5 mg (5 mg Intravenous $Given 7/26/25 0911)   ketorolac (TORADOL) injection 15 mg (15 mg Intravenous $Given 7/26/25 0903)   acetaminophen (TYLENOL) tablet 650 mg (650 mg Oral Not Given 7/26/25 1108)   sodium chloride 0.9% BOLUS 1,000 mL (0 mLs Intravenous Stopped 7/26/25 1145)   fentaNYL (PF) (SUBLIMAZE) injection 50 mcg (50 mcg Intravenous $Given 7/26/25 0943)   iopamidol (ISOVUE-370) solution 64 mL (64 mLs Intravenous $Given 7/26/25 1103)   fentaNYL (PF) (SUBLIMAZE) injection 50 mcg (50 mcg Intravenous  $Given 7/26/25 9096)       Final Impression     1. Epigastric pain    2. Cannabinoid hyperemesis syndrome    3. Carpopedal spasm    4. Alcohol ingestion    5. Nausea and vomiting, unspecified vomiting type            Chief Complaint     Chief Complaint   Patient presents with    Alcohol Intoxication       Pt vomiting greater than 20 x in past 4 hours after consuming 5 drinks last night, pt reports seldom drinks alcohol.  Pt tremulous, reports no daily meds , but reports hx of seuzures.       Triage Assessment (Adult)       Row Name 07/26/25 075          Triage Assessment    Airway WDL WDL        Respiratory WDL    Respiratory WDL WDL        Skin Circulation/Temperature WDL    Skin Circulation/Temperature WDL WDL        Cardiac WDL    Cardiac WDL WDL        Peripheral/Neurovascular WDL    Peripheral Neurovascular WDL WDL        Cognitive/Neuro/Behavioral WDL    Cognitive/Neuro/Behavioral WDL WDL        Aron Coma Scale    Best Eye Response 4-->(E4) spontaneous     Best Motor Response 6-->(M6) obeys commands     Best Verbal Response 5-->(V5) oriented     [place] Coma Scale Score 15               HPI       Cassie Morrison is a 34 year old female with a PMHx of cannabis use disorder, chronic midline low back pain, bipolar 2 disorder, endometriosis, pelvic inflammatory disease, depression, anxiety, and Crohn's disease, who presents to the ED via walk-in for evaluation of alcohol intoxication and persistent vomiting.    Per the patient's significant other, reports that they were drinking and went to a concert. The patient had her last drink of alcohol eight hours ago. He took her home and gave her fluids to drink and gave her a bath. Patient went to bed and slept soundly for 3-4 hours. She woke up and started vomiting. She has been vomiting for the last 3-4 hours and she can't keep any fluids or solids down. She is retching and vomiting into an emesis bag here in the ED.    Per the patient, states that it is atypical  "for her to develop these symptoms that she is currently experiencing due to drinking alcohol. She endorses having upper abdominal pain right now. It does not hurt more on one side of her upper abdomen compared to the other side. The pain worsens when she leans back on the hospital bed when the head of the bed is propped up. She denies having any lower abdominal pain. She feels shaky. She also notes that she feels a \"burning\" sensation. Her limps, lips, hands and legs all feel tingly. On their way here to the ED, she was complaining that she can not feel her hands and feet, per the patient's significant other. Patient states that she feels like she is \"going in and out of consciousness\". She does not indicate if she believes anything else may have caused her to develop these symptoms. Notes that she is normally a healthy individual. Denies sustaining any injuries. States that she does not take any prescription medications. Reports that she has an allergy to morphine and notes that she developed \"nerve pain\" when she took this medication in the past. No additional complaints or concerns reported at this time.    IJany am serving as a scribe to document services personally performed by Hiram Epstein M.D. based on my observation and the provider's statements to me. IHiram M.D attest that Jany Rudolph is acting in a scribe capacity, has observed my performance of the services and has documented them in accordance with my direction.    Past Medical History     Past Medical History:   Diagnosis Date    Abnormal Pap smear of cervix 2012    Allergic     Anemia     Anxiety     Chickenpox     Depression     Endometriosis     Endometriosis     Endometriosis     Heart murmur     IUD migration     Kidney stone     Malignant hyperthermia due to anesthesia     Migraine     Papanicolaou smear of cervix with low grade squamous intraepithelial lesion (LGSIL) 6/13/12    PID (pelvic inflammatory disease)     " Postpartum depression     Suicidal ideation 2015    Urinary tract infection     Varicella      Past Surgical History:   Procedure Laterality Date     SECTION N/A 2016    Procedure:  SECTION;  Surgeon: Reno Alamo MD;  Location: [facility] L+D OR;  Service:     COLONOSCOPY      COLPOSCOPY      DILATION AND CURETTAGE SUCTION, TREAT INCOMPLETE       D&E    DILATION AND CURETTAGE, OPERATIVE HYSTEROSCOPY, COMBINED N/A 2015    D&C HSC, hysterectomy-2018    ENDOMETRIAL ABLATION      during one of the laparotomies    HC LAPAROSCOPIC IUD REMOVAL  2015    LAPAROSCOPIC HYSTERECTOMY N/A 2018    Procedure: ROBOTC TOTAL LAPAROSCOPIC HYSTERECTOMY, BILATERAL SALPINGECTOMY ;  Surgeon: Reno Alamo MD;  Location: [facility];  Service:     LAPAROSCOPY  2010    endometriosis    LAPAROTOMY EXPLORATORY      3x - last in 2015, , Gyne surgeon in  states no contraindication to vaginal birth per patient    LASER CO2 LAPAROSCOPIC VAPORIZATION ENDOMETRIUM N/A 2015    vaporization of endometriosis    OTHER SURGICAL HISTORY  2015    IUD removallaproscopic procedure    KS LAP,FULGURATE/EXCISE LESIONS Right 2017    Procedure: LAPAROSCOPY, OVARIAN CYSTECTOMY;  Surgeon: Reno Alamo MD;  Location: [facility] OR;  Service: Gynecology    [facility] ORAL SURGERY PROCEDURE      wisdom teeth x 5     Family History   Problem Relation Age of Onset    Blood Disease Paternal Grandfather         anemia    Cancer Paternal Grandfather     Diabetes Paternal Grandmother     Cerebrovascular Disease Father     Cerebrovascular Disease Paternal Grandfather     Depression Mother     Alcohol/Drug Mother         meth    Alcohol/Drug Father         drugs    Arthritis Mother     Substance Abuse Mother     Mental Illness Mother     Early Death Father     Mental Illness Sister         ADHD and anger issues    Alcoholism Maternal Aunt     Substance Abuse  "Maternal Aunt     Hypertension Maternal Aunt     Kidney Disease Maternal Aunt     Early Death Paternal Uncle     Cerebrovascular Disease Paternal Uncle     Heart Disease Maternal Grandmother     Hearing Loss Maternal Grandmother     Arthritis Maternal Grandmother     Dementia Maternal Grandfather     Hypertension Maternal Grandfather     Hypertension Paternal Grandmother     Arthritis Paternal Grandfather     Asthma Paternal Grandfather     Depression Paternal Grandfather     Hearing Loss Paternal Grandfather       Social History     Tobacco Use    Smoking status: Former     Types: Cigarettes    Smokeless tobacco: Never    Tobacco comments:     smoking 10cig/day- down to 1-2 with pregnancy   Vaping Use    Vaping status: Some Days    Substances: Nicotine, Flavoring    Devices: Disposable   Substance Use Topics    Alcohol use: Yes     Alcohol/week: 0.0 standard drinks of alcohol     Comment: 3 times a month    Drug use: No       Relevant past medical, surgical, family and social history as documented above, has been reviewed and discussed with patient. No changes or additions, unless otherwise noted in the HPI.    Current Medications     diazepam (VALIUM) 5 MG tablet  promethazine (PHENERGAN) 25 MG tablet  Ferrous Gluconate 324 (37.5 Fe) MG TABS        Allergies     Allergies   Allergen Reactions    Blood Transfusion Related (Informational Only) Other (See Comments)     Patient has a history of a clinically significant antibody against RBC antigens.  A delay in compatible RBCs may occur.    Strawberry Extract Rash    Morphine Unknown, Muscle Pain (Myalgia) and Nausea and Vomiting     Nerve pain    \"severe nerve pain\"       Review of Systems     Review of Systems   Gastrointestinal:  Positive for abdominal pain (upper), nausea and vomiting.   All other systems reviewed and are negative.       Remainder of systems reviewed, unless noted in HPI all others negative.    Physical Exam     /63   Pulse 67   Temp 96.8 " " F (36  C) (Temporal)   Resp 19   Ht 1.727 m (5' 8\")   Wt 58.5 kg (129 lb)   LMP 04/10/2016 (Exact Date)   SpO2 100%   BMI 19.61 kg/m      Physical Exam  Vitals and nursing note reviewed.   HENT:      Head: Normocephalic.      Nose: Nose normal.   Cardiovascular:      Rate and Rhythm: Normal rate.   Pulmonary:      Effort: Pulmonary effort is normal.   Abdominal:      Tenderness: There is abdominal tenderness. There is guarding. There is no rebound.   Neurological:      Mental Status: She is alert. Mental status is at baseline.   Psychiatric:         Mood and Affect: Mood normal.             Labs & Imaging         Labs Ordered and Resulted from Time of ED Arrival to Time of ED Departure   BASIC METABOLIC PANEL (LIMITED OCCURRENCES) - Abnormal       Result Value    Sodium 143      Potassium 3.4      Chloride 108 (*)     Carbon Dioxide (CO2) 15 (*)     Anion Gap 20 (*)     Urea Nitrogen 11.3      Creatinine 0.67      GFR Estimate >90      Calcium 9.5      Glucose 131 (*)    HEPATIC FUNCTION PANEL (LIMITED OCCURRENCES) - Abnormal    Protein Total 8.2      Albumin 4.5      Bilirubin Total 0.6      Alkaline Phosphatase 35 (*)     AST 23      ALT 22      Bilirubin Direct 0.11     LACTIC ACID WHOLE BLOOD WITH 1X REPEAT IN 2 HR WHEN >2 - Abnormal    Lactic Acid, Initial 6.1 (*)    LACTIC ACID WHOLE BLOOD - Abnormal    Lactic Acid 3.2 (*)    URINE DRUG SCREEN PANEL - Abnormal    Amphetamines Urine Screen Positive (*)     Barbituates Urine Screen Negative      Benzodiazepine Urine Screen Positive (*)     Cannabinoids Urine Screen Positive (*)     Cocaine Urine Screen Negative      Fentanyl Qual Urine Screen Negative      Opiates Urine Screen Negative      PCP Urine Screen Negative     LIPASE - Normal    Lipase 19     KETONE BETA-HYDROXYBUTYRATE QUANTITATIVE, RAPID - Normal    Ketone (Beta-Hydroxybutyrate) Quantitative <0.18     HCG QUALITATIVE PREGNANCY - Normal    hCG Serum Qualitative Negative           Results for " orders placed or performed during the hospital encounter of 07/26/25   US Abdomen Limited    Impression    IMPRESSION:  1.  Normal limited abdominal ultrasound.       CT Abdomen Pelvis w Contrast    Impression    IMPRESSION:   1.  Mild periportal edema and submucosal edema within the gallbladder wall likely related to third spacing/aggressive IV hydration. Acute hepatitis is also in the differential. A right upper quadrant ultrasound could be considered for further evaluation   if clinically indicated.  2.  Otherwise, no other CT evidence for source of symptoms.   Basic Metabolic Panel (Limited Occurrences)   Result Value Ref Range    Sodium 143 135 - 145 mmol/L    Potassium 3.4 3.4 - 5.3 mmol/L    Chloride 108 (H) 98 - 107 mmol/L    Carbon Dioxide (CO2) 15 (L) 22 - 29 mmol/L    Anion Gap 20 (H) 7 - 15 mmol/L    Urea Nitrogen 11.3 6.0 - 20.0 mg/dL    Creatinine 0.67 0.51 - 0.95 mg/dL    GFR Estimate >90 >60 mL/min/1.73m2    Calcium 9.5 8.8 - 10.4 mg/dL    Glucose 131 (H) 70 - 99 mg/dL   Hepatic Function Panel (Limited Occurrences)   Result Value Ref Range    Protein Total 8.2 6.4 - 8.3 g/dL    Albumin 4.5 3.5 - 5.2 g/dL    Bilirubin Total 0.6 <=1.2 mg/dL    Alkaline Phosphatase 35 (L) 40 - 150 U/L    AST 23 0 - 45 U/L    ALT 22 0 - 50 U/L    Bilirubin Direct 0.11 0.00 - 0.30 mg/dL   Result Value Ref Range    Lipase 19 13 - 60 U/L   Lactic acid whole blood with 1x repeat in 2 hr when >2   Result Value Ref Range    Lactic Acid, Initial 6.1 (HH) 0.7 - 2.0 mmol/L   Ketone Beta-Hydroxybutyrate Quantitative   Result Value Ref Range    Ketone (Beta-Hydroxybutyrate) Quantitative <0.18 <=0.30 mmol/L   Extra Red Top Tube   Result Value Ref Range    Hold Specimen JIC    Extra Purple Top Tube   Result Value Ref Range    Hold Specimen JIC    Lactic acid whole blood   Result Value Ref Range    Lactic Acid 3.2 (H) 0.7 - 2.0 mmol/L   HCG QUALitative pregnancy (blood)   Result Value Ref Range    hCG Serum Qualitative Negative  Negative   Urine Drug Screen Panel   Result Value Ref Range    Amphetamines Urine Screen Positive (A) Screen Negative    Barbituates Urine Screen Negative Screen Negative    Benzodiazepine Urine Screen Positive (A) Screen Negative    Cannabinoids Urine Screen Positive (A) Screen Negative    Cocaine Urine Screen Negative Screen Negative    Fentanyl Qual Urine Screen Negative Screen Negative    Opiates Urine Screen Negative Screen Negative    PCP Urine Screen Negative Screen Negative       Hiram Epstein MD  Emergency Medicine  [facility] EMERGENCY DEPARTMENT  [facility] [facility] [address]  [phone]  7/26/2025        Hiram Epstein MD  07/26/25 1259

## 2025-07-26 NOTE — ED TRIAGE NOTES
Pt vomiting greater than 20 x in past 4 hours after consuming 5 drinks last night, pt reports seldom drinks alcohol.  Pt tremulous, reports no daily meds , but reports hx of seuzures.       Triage Assessment (Adult)       Row Name 07/26/25 0752          Triage Assessment    Airway WDL WDL        Respiratory WDL    Respiratory WDL WDL        Skin Circulation/Temperature WDL    Skin Circulation/Temperature WDL WDL        Cardiac WDL    Cardiac WDL WDL        Peripheral/Neurovascular WDL    Peripheral Neurovascular WDL WDL        Cognitive/Neuro/Behavioral WDL    Cognitive/Neuro/Behavioral WDL WDL        [place] Coma Scale    Best Eye Response 4-->(E4) spontaneous     Best Motor Response 6-->(M6) obeys commands     Best Verbal Response 5-->(V5) oriented     Aron Coma Scale Score 15

## 2025-07-27 ENCOUNTER — HOSPITAL ENCOUNTER (EMERGENCY)
Facility: HOSPITAL | Age: 35
Discharge: HOME | End: 2025-07-27

## 2025-07-27 VITALS
OXYGEN SATURATION: 99 % | RESPIRATION RATE: 24 BRPM | WEIGHT: 131 LBS | BODY MASS INDEX: 19.85 KG/M2 | HEART RATE: 42 BPM | HEIGHT: 68 IN | DIASTOLIC BLOOD PRESSURE: 54 MMHG | TEMPERATURE: 99.1 F | SYSTOLIC BLOOD PRESSURE: 100 MMHG

## 2025-07-27 DIAGNOSIS — K29.70 GASTRITIS: Primary | ICD-10-CM

## 2025-07-27 DIAGNOSIS — F12.90 CANNABINOID HYPEREMESIS SYNDROME: ICD-10-CM

## 2025-07-27 DIAGNOSIS — R11.2 CANNABINOID HYPEREMESIS SYNDROME: ICD-10-CM

## 2025-07-27 LAB
ALBUMIN SERPL BCG-MCNC: 4.2 G/DL (ref 3.5–5.2)
ALP SERPL-CCNC: 30 U/L (ref 40–150)
ALT SERPL W P-5'-P-CCNC: 27 U/L (ref 0–50)
ANION GAP SERPL CALCULATED.3IONS-SCNC: 12 MMOL/L (ref 7–15)
AST SERPL W P-5'-P-CCNC: 34 U/L (ref 0–45)
BASOPHILS # BLD AUTO: 0 10E3/UL (ref 0–0.2)
BASOPHILS NFR BLD AUTO: 0 %
BILIRUB DIRECT SERPL-MCNC: 0.09 MG/DL (ref 0–0.3)
BILIRUB SERPL-MCNC: 0.5 MG/DL
BUN SERPL-MCNC: 10.9 MG/DL (ref 6–20)
CALCIUM SERPL-MCNC: 9.3 MG/DL (ref 8.8–10.4)
CHLORIDE SERPL-SCNC: 107 MMOL/L (ref 98–107)
CREAT SERPL-MCNC: 0.75 MG/DL (ref 0.51–0.95)
EGFRCR SERPLBLD CKD-EPI 2021: >90 ML/MIN/1.73M2
EOSINOPHIL # BLD AUTO: 0 10E3/UL (ref 0–0.7)
EOSINOPHIL NFR BLD AUTO: 0 %
ERYTHROCYTE [DISTWIDTH] IN BLOOD BY AUTOMATED COUNT: 12.6 % (ref 10–15)
GLUCOSE SERPL-MCNC: 107 MG/DL (ref 70–99)
HCO3 SERPL-SCNC: 25 MMOL/L (ref 22–29)
HCT VFR BLD AUTO: 34.7 % (ref 35–47)
HGB BLD-MCNC: 12 G/DL (ref 11.7–15.7)
IMM GRANULOCYTES # BLD: 0 10E3/UL
IMM GRANULOCYTES NFR BLD: 0 %
LACTATE SERPL-SCNC: 1.2 MMOL/L (ref 0.7–2)
LIPASE SERPL-CCNC: 15 U/L (ref 13–60)
LYMPHOCYTES # BLD AUTO: 1.5 10E3/UL (ref 0.8–5.3)
LYMPHOCYTES NFR BLD AUTO: 22 %
MAGNESIUM SERPL-MCNC: 1.9 MG/DL (ref 1.7–2.3)
MCH RBC QN AUTO: 30.6 PG (ref 26.5–33)
MCHC RBC AUTO-ENTMCNC: 34.6 G/DL (ref 31.5–36.5)
MCV RBC AUTO: 89 FL (ref 78–100)
MONOCYTES # BLD AUTO: 0.5 10E3/UL (ref 0–1.3)
MONOCYTES NFR BLD AUTO: 7 %
NEUTROPHILS # BLD AUTO: 4.7 10E3/UL (ref 1.6–8.3)
NEUTROPHILS NFR BLD AUTO: 71 %
NRBC # BLD AUTO: 0 10E3/UL
NRBC BLD AUTO-RTO: 0 /100
PLATELET # BLD AUTO: 203 10E3/UL (ref 150–450)
POTASSIUM SERPL-SCNC: 3.4 MMOL/L (ref 3.4–5.3)
PROT SERPL-MCNC: 7.7 G/DL (ref 6.4–8.3)
RBC # BLD AUTO: 3.92 10E6/UL (ref 3.8–5.2)
SODIUM SERPL-SCNC: 144 MMOL/L (ref 135–145)
WBC # BLD AUTO: 6.6 10E3/UL (ref 4–11)

## 2025-07-27 PROCEDURE — 96374 THER/PROPH/DIAG INJ IV PUSH: CPT

## 2025-07-27 PROCEDURE — 93005 ELECTROCARDIOGRAM TRACING: CPT

## 2025-07-27 PROCEDURE — 36415 COLL VENOUS BLD VENIPUNCTURE: CPT

## 2025-07-27 PROCEDURE — 99284 EMERGENCY DEPT VISIT MOD MDM: CPT | Mod: 25

## 2025-07-27 PROCEDURE — 96361 HYDRATE IV INFUSION ADD-ON: CPT

## 2025-07-27 PROCEDURE — 85004 AUTOMATED DIFF WBC COUNT: CPT

## 2025-07-27 PROCEDURE — 82247 BILIRUBIN TOTAL: CPT

## 2025-07-27 PROCEDURE — 82248 BILIRUBIN DIRECT: CPT

## 2025-07-27 PROCEDURE — 96375 TX/PRO/DX INJ NEW DRUG ADDON: CPT

## 2025-07-27 PROCEDURE — 80048 BASIC METABOLIC PNL TOTAL CA: CPT

## 2025-07-27 PROCEDURE — 83690 ASSAY OF LIPASE: CPT

## 2025-07-27 PROCEDURE — 258N000003 HC RX IP 258 OP 636

## 2025-07-27 PROCEDURE — 83605 ASSAY OF LACTIC ACID: CPT

## 2025-07-27 PROCEDURE — 250N000011 HC RX IP 250 OP 636

## 2025-07-27 PROCEDURE — 96376 TX/PRO/DX INJ SAME DRUG ADON: CPT

## 2025-07-27 PROCEDURE — 83735 ASSAY OF MAGNESIUM: CPT

## 2025-07-27 RX ORDER — ONDANSETRON 4 MG/1
4 TABLET, ORALLY DISINTEGRATING ORAL EVERY 8 HOURS PRN
Qty: 10 TABLET | Refills: 0 | Status: SHIPPED | OUTPATIENT
Start: 2025-07-27

## 2025-07-27 RX ORDER — HYDROMORPHONE HYDROCHLORIDE 1 MG/ML
0.5 INJECTION, SOLUTION INTRAMUSCULAR; INTRAVENOUS; SUBCUTANEOUS ONCE
Refills: 0 | Status: COMPLETED | OUTPATIENT
Start: 2025-07-27 | End: 2025-07-27

## 2025-07-27 RX ORDER — DIPHENHYDRAMINE HYDROCHLORIDE 50 MG/ML
25 INJECTION, SOLUTION INTRAMUSCULAR; INTRAVENOUS ONCE
Status: COMPLETED | OUTPATIENT
Start: 2025-07-27 | End: 2025-07-27

## 2025-07-27 RX ORDER — HYDROMORPHONE HYDROCHLORIDE 1 MG/ML
0.5 INJECTION, SOLUTION INTRAMUSCULAR; INTRAVENOUS; SUBCUTANEOUS ONCE
Status: COMPLETED | OUTPATIENT
Start: 2025-07-27 | End: 2025-07-27

## 2025-07-27 RX ORDER — ONDANSETRON 2 MG/ML
4 INJECTION INTRAMUSCULAR; INTRAVENOUS ONCE
Status: DISCONTINUED | OUTPATIENT
Start: 2025-07-27 | End: 2025-07-27

## 2025-07-27 RX ORDER — FAMOTIDINE 20 MG/1
20 TABLET, FILM COATED ORAL 2 TIMES DAILY PRN
Qty: 30 TABLET | Refills: 0 | Status: SHIPPED | OUTPATIENT
Start: 2025-07-27

## 2025-07-27 RX ADMIN — SODIUM CHLORIDE 1000 ML: 0.9 INJECTION, SOLUTION INTRAVENOUS at 14:12

## 2025-07-27 RX ADMIN — HYDROMORPHONE HYDROCHLORIDE 0.5 MG: 1 INJECTION, SOLUTION INTRAMUSCULAR; INTRAVENOUS; SUBCUTANEOUS at 14:12

## 2025-07-27 RX ADMIN — PROCHLORPERAZINE EDISYLATE 5 MG: 5 INJECTION INTRAMUSCULAR; INTRAVENOUS at 15:16

## 2025-07-27 RX ADMIN — FAMOTIDINE 20 MG: 10 INJECTION, SOLUTION INTRAVENOUS at 14:36

## 2025-07-27 RX ADMIN — DIPHENHYDRAMINE HYDROCHLORIDE 25 MG: 50 INJECTION, SOLUTION INTRAMUSCULAR; INTRAVENOUS at 14:28

## 2025-07-27 RX ADMIN — HYDROMORPHONE HYDROCHLORIDE 0.5 MG: 1 INJECTION, SOLUTION INTRAMUSCULAR; INTRAVENOUS; SUBCUTANEOUS at 15:47

## 2025-07-27 ASSESSMENT — ACTIVITIES OF DAILY LIVING (ADL)
ADLS_ACUITY_SCORE: 45

## 2025-07-27 ASSESSMENT — COLUMBIA-SUICIDE SEVERITY RATING SCALE - C-SSRS
6. HAVE YOU EVER DONE ANYTHING, STARTED TO DO ANYTHING, OR PREPARED TO DO ANYTHING TO END YOUR LIFE?: NO
1. IN THE PAST MONTH, HAVE YOU WISHED YOU WERE DEAD OR WISHED YOU COULD GO TO SLEEP AND NOT WAKE UP?: NO
2. HAVE YOU ACTUALLY HAD ANY THOUGHTS OF KILLING YOURSELF IN THE PAST MONTH?: NO

## 2025-07-27 NOTE — ED PROVIDER NOTES
EMERGENCY DEPARTMENT ENCOUNTER      NAME: Cassie Morrison  AGE: 34 year old female  YOB: 1990  MRN: [id number]  EVALUATION DATE & TIME: No admission date for patient encounter.    PCP: Summer Zhu    ED PROVIDER: Sugey Hinojosa PA-C    CHIEF COMPLAINT  Nausea & Vomiting      FINAL IMPRESSION:      ICD-10-CM    1. Gastritis  K29.70       2. Cannabinoid hyperemesis syndrome  R11.2     F12.90           MEDICAL DECISION MAKING AND ED COURSE:  Pertinent Labs & Imaging studies reviewed (See chart for details)  ED Course as of 07/29/25 1100   Sun Jul 27, 2025   1354 Patient is a 34-year-old female with a history of alcohol and cannabis use, most recently seen in the ER yesterday for cannabinoid hyperemesis.  Labs revealed elevated lactic but otherwise reassuring CT.  Was discharged home and instructed not to smoke marijuana.  After discharge home went and smoked marijuana.  Started throwing up around 2 AM and now presents back with same symptoms.  Hypertensive at 141/76 but otherwise afebrile.  Bradycardic at 54 but no hypoxia or tachypnea.  On exam, she is appearing, diaphoretic.  Abdominal tenderness in the epigastric and right upper quadrant with minimal guarding. No abdominal tenderness anywhere else.     Was placed and labs were drawn.  She just had an ultrasound and CT yesterday which was reassuring and vomiting did not start until after she went home and smoked marijuana.  I do suspect this is a recurrence of her cannabinoid hyperemesis and will defer any imaging at this time   1557 Feels improved after fluids.  Reports that her pressures tend to run low in the mid 90s when she is not in pain and feeling relaxed and feels improved here currently and her most recent pressure was 93/55.  Labs are overall reassuring including no leukocytosis or anemia.  Lactic 1.2, which better than yesterday which was around 6.  Lipase and LFTs within normal limits.  Electrolytes and kidney function within  normal limits.  No elevated anion gap here today.      At this point suspect cannabinoid hyperemesis given that she smoked marijuana when she went home even though she was instructed not to, but I think at this point there is some gastritis and inflammation of her stomach as she is complaining of a lot of epigastric pain with a reassuring CT and imaging yesterday as well as reassuring labs today.  I spoke to pharmacy who said her QT does not look as bad today could do Zofran ODT sparingly since patient says this works best for her as well as some Pepcid, bland diet, and strict return precautions.  She was agreeable.       2:03 PM I met with the patient, obtained history, performed an initial exam, and discussed options and plan for diagnostics and treatment here in the ED.   1545 updated patient. Feeling improved. Ready for discharge.   1630 We discussed plans for discharge including supportive cares, symptomatic treatment, outpatient follow up, and reasons to return to the emergency department.      MEDICATIONS GIVEN IN THE EMERGENCY:  Medications   sodium chloride 0.9% BOLUS 1,000 mL (0 mLs Intravenous Stopped 7/27/25 1610)   famotidine (PEPCID) injection 20 mg (20 mg Intravenous $Given 7/27/25 1436)   diphenhydrAMINE (BENADRYL) injection 25 mg (25 mg Intravenous $Given 7/27/25 1428)   HYDROmorphone (PF) (DILAUDID) injection 0.5 mg (0.5 mg Intravenous $Given 7/27/25 1412)   prochlorperazine (COMPAZINE) injection 5 mg (5 mg Intravenous $Given 7/27/25 1516)   HYDROmorphone (PF) (DILAUDID) injection 0.5 mg (0.5 mg Intravenous $Given 7/27/25 1547)       NEW PRESCRIPTIONS STARTED AT TODAY'S ER VISIT  Discharge Medication List as of 7/27/2025  4:11 PM        START taking these medications    Details   famotidine (PEPCID) 20 MG tablet Take 1 tablet (20 mg) by mouth 2 times daily as needed (gastritis)., Disp-30 tablet, R-0, E-Prescribe      ondansetron (ZOFRAN ODT) 4 MG ODT tab Take 1 tablet (4 mg) by mouth every 8 hours  "as needed for nausea or vomiting., Disp-10 tablet, R-0, E-Prescribe           Discharge Medication List as of 7/27/2025  4:11 PM          =================================================================    HPI    Patient information was obtained from: the patient   Use of : N/A    Cassie Morrison is a 34 year old female with a pertinent history of s/p laparoscopy (5/2010), s/p colonoscopy, s/p endometrial ablation (12/2015) and s/p laparoscopic hysterectomy (1/31/2018), who presents to this ED by walk-in for evaluation of nausea and vomiting.      Per Chart Review, the patient was seen on 7/26/2025 at [facility] Emergency Department for evaluation of alcohol intoxication and persistent vomiting. Patient was drinking at a concert on 7/25/2025 and went home safely with no complication. After sleeping soundly for 3-4 hours, patient woke up vomiting and had been vomiting for another 3-4 hours. Patient was unable to keep any fluids or solids down and stated that she felt like she was \"going in and out of consciousness.\"Patient reported upper abdominal pain and denied lower abdominal pain. Patient reported an allergy to morphine and that this reaction was atypical due to drinking alcohol. She was given IV fluids, Toradol and Zofran. Labs revealed lactic acidosis of 6.1. Ketones, liver enzymes and lipase were unremarkable. Ultrasound was unremarkable. CT scan of the abdomen was without acute process or obstruction. UDS was positive for cannabinoids. She was later given Valium, Phenergan and Benadryl with symptomatic improvement. The patient was discharged Valium and Phenergan. Counseled on avoidance of drugs and alcohol. Patient reported smoking marijuana after discharge.     Patient reports vomiting starting around 2-3 AM this morning (7/27/2025). Patient reported consistent stomach pain throughout and a decrease in appetite. Patient had a low heart rate which was stated as normal. " "    Patient denies any fever, back pain, or blood in vomit or urine.     PHYSICAL EXAM    /54   Pulse (!) 42   Temp 99.1  F (37.3  C) (Oral)   Resp 24   Ht 1.727 m (5' 8\")   Wt 59.4 kg (131 lb)   LMP 04/10/2016 (Exact Date)   SpO2 99%   BMI 19.92 kg/m    Constitutional:  Awake, alert, uncomfortable appearing/dry heaving  HENT:  Normocephalic, Atraumatic. Bilateral external ears normal. Oropharynx moist. Nose normal. Neck- Normal range of motion with no guarding, No midline cervical tenderness, Supple, No stridor.   Eyes:  EOMI with no signs of entrapment, Conjunctiva normal, No discharge.   Respiratory:  Normal breath sounds, No respiratory distress, No wheezing.    Cardiovascular:  Normal heart rate, Normal rhythm, No appreciable rubs or gallops.   GI:  Soft, No distension, No palpable masses, epigastric abdominal tenderness.  Musculoskeletal:  Intact distal pulses, No edema. Good range of motion in all major joints. No tenderness to palpation or major deformities noted.  Integument:  Warm, Dry, No erythema, No rash.   Neurologic:  Alert & oriented, Normal motor function, Normal sensory function, No focal deficits noted.   Psychiatric:  Affect normal, Judgment normal, Mood normal.      LAB:  All pertinent labs reviewed and interpreted.  Results for orders placed or performed during the hospital encounter of 07/27/25   Basic Metabolic Panel (Limited Occurrences)   Result Value Ref Range    Sodium 144 135 - 145 mmol/L    Potassium 3.4 3.4 - 5.3 mmol/L    Chloride 107 98 - 107 mmol/L    Carbon Dioxide (CO2) 25 22 - 29 mmol/L    Anion Gap 12 7 - 15 mmol/L    Urea Nitrogen 10.9 6.0 - 20.0 mg/dL    Creatinine 0.75 0.51 - 0.95 mg/dL    GFR Estimate >90 >60 mL/min/1.73m2    Calcium 9.3 8.8 - 10.4 mg/dL    Glucose 107 (H) 70 - 99 mg/dL   Hepatic Function Panel (Limited Occurrences)   Result Value Ref Range    Protein Total 7.7 6.4 - 8.3 g/dL    Albumin 4.2 3.5 - 5.2 g/dL    Bilirubin Total 0.5 <=1.2 mg/dL    " Alkaline Phosphatase 30 (L) 40 - 150 U/L    AST 34 0 - 45 U/L    ALT 27 0 - 50 U/L    Bilirubin Direct 0.09 0.00 - 0.30 mg/dL   Result Value Ref Range    Lipase 15 13 - 60 U/L   Magnesium (Limited Occurrences)   Result Value Ref Range    Magnesium 1.9 1.7 - 2.3 mg/dL   Lactic acid whole blood with 1x repeat in 2 hr when >2   Result Value Ref Range    Lactic Acid, Initial 1.2 0.7 - 2.0 mmol/L   CBC with platelets and differential   Result Value Ref Range    WBC Count 6.6 4.0 - 11.0 10e3/uL    RBC Count 3.92 3.80 - 5.20 10e6/uL    Hemoglobin 12.0 11.7 - 15.7 g/dL    Hematocrit 34.7 (L) 35.0 - 47.0 %    MCV 89 78 - 100 fL    MCH 30.6 26.5 - 33.0 pg    MCHC 34.6 31.5 - 36.5 g/dL    RDW 12.6 10.0 - 15.0 %    Platelet Count 203 150 - 450 10e3/uL    % Neutrophils 71 %    % Lymphocytes 22 %    % Monocytes 7 %    % Eosinophils 0 %    % Basophils 0 %    % Immature Granulocytes 0 %    NRBCs per 100 WBC 0 <1 /100    Absolute Neutrophils 4.7 1.6 - 8.3 10e3/uL    Absolute Lymphocytes 1.5 0.8 - 5.3 10e3/uL    Absolute Monocytes 0.5 0.0 - 1.3 10e3/uL    Absolute Eosinophils 0.0 0.0 - 0.7 10e3/uL    Absolute Basophils 0.0 0.0 - 0.2 10e3/uL    Absolute Immature Granulocytes 0.0 <=0.4 10e3/uL    Absolute NRBCs 0.0 10e3/uL       RADIOLOGY:  Reviewed all pertinent imaging. Please see official radiology report.  No orders to display       EKG:    Performed at: 2:20 PM, 07/27/25    Impression: Sinus bradycardia with sinus arrhythmia. Abnormal EKG.    Rate: 42 bpm  Rhythm: Sinus bradycardia with sinus arrhythmia  Axis: 56 68 44  HI Interval: 148 ms  QRS Interval: 86 ms  QTc Interval: 424 ms  ST Changes: None  Comparison: When compared EKG of 7/26/2025, nonspecific T wave abnormality has replaced inverted T waves in anterior leads.     An ED MD independently reviewed and interpreted the EKG(s) documented above.    Medical Decision Making  I obtained history from Family Member/Significant Other  Discharge. I prescribed additional  prescription strength medication(s) as charted. See documentation for any additional details.    MIPS (CTPE, Dental pain, Low, Sinusitis, Asthma/COPD, Head Trauma):     SEPSIS: None      I, Kris Mcclellan, am serving as a scribe to document services personally performed by Sugey Hinojosa PA-C based on my observation and the provider's statements to me. I, Sugey Hinojosa PA-C, attest that Kris Mcclellan is acting in a scribe capacity, has observed my performance of the services and has documented them in accordance with my direction.    Sugey Hinojosa PA-C  [facility] EMERGENCY DEPARTMENT  [facility] [facility] [id number] [phone]      Sugey Hinojosa PA-C  07/29/25 1128

## 2025-07-27 NOTE — DISCHARGE INSTRUCTIONS
You were seen here in the emergency department for recurrent vomiting and abdominal pain.  Your labs here are all very reassuring and your imaging yesterday was also reassuring.  I do think a lot of this could be secondary to gastritis which is inflammation of the stomach lining after repetitive vomiting and the movement food or substances hit the stomach, then it causes the cyclical vomiting.  Marijuana can also cause something known as cannabinoid hyperemesis syndrome.  It usually affects people who has been smoking marijuana for a lot of their life and then all of a sudden it hits them out of nowhere and then after that, every time they smoke they become cyclical with their vomiting.  I recommend avoiding marijuana and alcohol for at least a few days to give your stomach and body a chance to adjust.  Decrease, stop smoking marijuana altogether to avoid this from recurring.    I am going to recommend Pepcid 20 mg twice daily.  This is a medication that helps reduce the acid in the stomach which can help with gastritis and pain after repetitive vomiting.  I also spoke to our pharmacist and they said that a few tablets of Zofran ODT would be appropriate.  As long as you are taking it sparingly.  Meaning, if you start to feel nauseous, take that medication first and then once the nausea gets a bit better, prioritizing other meds that you were sent home with including the Pepcid, Valium, etc.    Recommend a bland diet diet or even a clear liquid diet for a day or two, to give your stomach a chance to rest.  If you start to feel nauseous, consider taking a hot shower.  If it is secondary to cannabis use, hot showers tend to be helpful.    If you start vomiting again and are not able to keep anything down, or vomiting blood, fevers, worsening abdominal pain, or other concerning symptoms please return to the emergency department.

## 2025-07-27 NOTE — ED TRIAGE NOTES
Pt was seen yesterday for what she thought was alcohol poisoning. Felt a little better but then around 0200 started vomiting again. Reports smoking weed yesterday     Triage Assessment (Adult)       Row Name 07/27/25 1257          Triage Assessment    Airway WDL WDL        Respiratory WDL    Respiratory WDL WDL        Skin Circulation/Temperature WDL    Skin Circulation/Temperature WDL WDL        Cardiac WDL    Cardiac WDL WDL        Peripheral/Neurovascular WDL    Peripheral Neurovascular WDL WDL        Cognitive/Neuro/Behavioral WDL    Cognitive/Neuro/Behavioral WDL WDL

## 2025-07-27 NOTE — Clinical Note
Cassie Morrison was seen and treated in our emergency department on 7/27/2025.  She may return to work on 07/29/2025.       If you have any questions or concerns, please don't hesitate to call.      Sugey Hinojosa PA-C

## 2025-07-28 ENCOUNTER — HOSPITAL ENCOUNTER (EMERGENCY)
Facility: HOSPITAL | Age: 35
Discharge: HOME | End: 2025-07-28
Attending: EMERGENCY MEDICINE

## 2025-07-28 VITALS
DIASTOLIC BLOOD PRESSURE: 53 MMHG | RESPIRATION RATE: 12 BRPM | OXYGEN SATURATION: 96 % | BODY MASS INDEX: 19.85 KG/M2 | SYSTOLIC BLOOD PRESSURE: 103 MMHG | TEMPERATURE: 100.9 F | HEIGHT: 68 IN | WEIGHT: 131 LBS | HEART RATE: 57 BPM

## 2025-07-28 DIAGNOSIS — F12.90 CANNABINOID HYPEREMESIS SYNDROME: Primary | ICD-10-CM

## 2025-07-28 DIAGNOSIS — E87.6 HYPOKALEMIA: ICD-10-CM

## 2025-07-28 DIAGNOSIS — R11.2 CANNABINOID HYPEREMESIS SYNDROME: Primary | ICD-10-CM

## 2025-07-28 PROBLEM — K50.90 REGIONAL ENTERITIS (H): Status: ACTIVE | Noted: 2023-05-16

## 2025-07-28 PROBLEM — F10.920 ALCOHOLIC INTOXICATION WITHOUT COMPLICATION: Status: ACTIVE | Noted: 2018-07-29

## 2025-07-28 LAB
ALBUMIN SERPL BCG-MCNC: 4.3 G/DL (ref 3.5–5.2)
ALP SERPL-CCNC: 32 U/L (ref 40–150)
ALT SERPL W P-5'-P-CCNC: 29 U/L (ref 0–50)
ANION GAP SERPL CALCULATED.3IONS-SCNC: 11 MMOL/L (ref 7–15)
AST SERPL W P-5'-P-CCNC: 30 U/L (ref 0–45)
BILIRUB SERPL-MCNC: 0.6 MG/DL
BUN SERPL-MCNC: 10.2 MG/DL (ref 6–20)
CALCIUM SERPL-MCNC: 9.5 MG/DL (ref 8.8–10.4)
CHLORIDE SERPL-SCNC: 106 MMOL/L (ref 98–107)
CREAT SERPL-MCNC: 0.84 MG/DL (ref 0.51–0.95)
EGFRCR SERPLBLD CKD-EPI 2021: >90 ML/MIN/1.73M2
ERYTHROCYTE [DISTWIDTH] IN BLOOD BY AUTOMATED COUNT: 12.5 % (ref 10–15)
GLUCOSE SERPL-MCNC: 102 MG/DL (ref 70–99)
HCO3 SERPL-SCNC: 23 MMOL/L (ref 22–29)
HCT VFR BLD AUTO: 36.6 % (ref 35–47)
HGB BLD-MCNC: 12.2 G/DL (ref 11.7–15.7)
HOLD SPECIMEN: NORMAL
LIPASE SERPL-CCNC: 18 U/L (ref 13–60)
MAGNESIUM SERPL-MCNC: 1.9 MG/DL (ref 1.7–2.3)
MCH RBC QN AUTO: 30.1 PG (ref 26.5–33)
MCHC RBC AUTO-ENTMCNC: 33.3 G/DL (ref 31.5–36.5)
MCV RBC AUTO: 90 FL (ref 78–100)
PLATELET # BLD AUTO: 198 10E3/UL (ref 150–450)
POTASSIUM SERPL-SCNC: 3.1 MMOL/L (ref 3.4–5.3)
PROT SERPL-MCNC: 7.7 G/DL (ref 6.4–8.3)
RBC # BLD AUTO: 4.05 10E6/UL (ref 3.8–5.2)
SODIUM SERPL-SCNC: 140 MMOL/L (ref 135–145)
WBC # BLD AUTO: 7.2 10E3/UL (ref 4–11)

## 2025-07-28 PROCEDURE — 36415 COLL VENOUS BLD VENIPUNCTURE: CPT | Performed by: EMERGENCY MEDICINE

## 2025-07-28 PROCEDURE — 250N000011 HC RX IP 250 OP 636: Performed by: EMERGENCY MEDICINE

## 2025-07-28 PROCEDURE — 83690 ASSAY OF LIPASE: CPT | Performed by: EMERGENCY MEDICINE

## 2025-07-28 PROCEDURE — 84155 ASSAY OF PROTEIN SERUM: CPT | Performed by: EMERGENCY MEDICINE

## 2025-07-28 PROCEDURE — 96361 HYDRATE IV INFUSION ADD-ON: CPT

## 2025-07-28 PROCEDURE — 99284 EMERGENCY DEPT VISIT MOD MDM: CPT | Mod: 25 | Performed by: EMERGENCY MEDICINE

## 2025-07-28 PROCEDURE — 96374 THER/PROPH/DIAG INJ IV PUSH: CPT

## 2025-07-28 PROCEDURE — 250N000013 HC RX MED GY IP 250 OP 250 PS 637: Performed by: EMERGENCY MEDICINE

## 2025-07-28 PROCEDURE — 96375 TX/PRO/DX INJ NEW DRUG ADDON: CPT

## 2025-07-28 PROCEDURE — 85027 COMPLETE CBC AUTOMATED: CPT | Performed by: EMERGENCY MEDICINE

## 2025-07-28 PROCEDURE — 258N000003 HC RX IP 258 OP 636: Performed by: EMERGENCY MEDICINE

## 2025-07-28 PROCEDURE — 83735 ASSAY OF MAGNESIUM: CPT | Performed by: EMERGENCY MEDICINE

## 2025-07-28 PROCEDURE — 80051 ELECTROLYTE PANEL: CPT | Performed by: EMERGENCY MEDICINE

## 2025-07-28 RX ORDER — FENTANYL CITRATE 50 UG/ML
100 INJECTION, SOLUTION INTRAMUSCULAR; INTRAVENOUS ONCE
Refills: 0 | Status: COMPLETED | OUTPATIENT
Start: 2025-07-28 | End: 2025-07-28

## 2025-07-28 RX ORDER — PROCHLORPERAZINE MALEATE 10 MG
10 TABLET ORAL EVERY 6 HOURS PRN
Qty: 30 TABLET | Refills: 0 | Status: SHIPPED | OUTPATIENT
Start: 2025-07-28

## 2025-07-28 RX ORDER — POTASSIUM CHLORIDE 1.5 G/1.58G
40 POWDER, FOR SOLUTION ORAL ONCE
Status: COMPLETED | OUTPATIENT
Start: 2025-07-28 | End: 2025-07-28

## 2025-07-28 RX ORDER — ONDANSETRON 2 MG/ML
4 INJECTION INTRAMUSCULAR; INTRAVENOUS ONCE
Status: DISCONTINUED | OUTPATIENT
Start: 2025-07-28 | End: 2025-07-28

## 2025-07-28 RX ORDER — DIPHENHYDRAMINE HYDROCHLORIDE 50 MG/ML
25 INJECTION, SOLUTION INTRAMUSCULAR; INTRAVENOUS ONCE
Status: COMPLETED | OUTPATIENT
Start: 2025-07-28 | End: 2025-07-28

## 2025-07-28 RX ORDER — DIAZEPAM 10 MG/2ML
5 INJECTION, SOLUTION INTRAMUSCULAR; INTRAVENOUS ONCE
Status: COMPLETED | OUTPATIENT
Start: 2025-07-28 | End: 2025-07-28

## 2025-07-28 RX ADMIN — FENTANYL CITRATE 100 MCG: 50 INJECTION, SOLUTION INTRAMUSCULAR; INTRAVENOUS at 20:25

## 2025-07-28 RX ADMIN — PROCHLORPERAZINE EDISYLATE 10 MG: 5 INJECTION INTRAMUSCULAR; INTRAVENOUS at 18:35

## 2025-07-28 RX ADMIN — POTASSIUM CHLORIDE 40 MEQ: 1.5 POWDER, FOR SOLUTION ORAL at 20:25

## 2025-07-28 RX ADMIN — FAMOTIDINE 20 MG: 10 INJECTION, SOLUTION INTRAVENOUS at 18:36

## 2025-07-28 RX ADMIN — PANTOPRAZOLE SODIUM 40 MG: 40 INJECTION, POWDER, FOR SOLUTION INTRAVENOUS at 17:37

## 2025-07-28 RX ADMIN — DIAZEPAM 5 MG: 10 INJECTION, SOLUTION INTRAMUSCULAR; INTRAVENOUS at 18:36

## 2025-07-28 RX ADMIN — SODIUM CHLORIDE, SODIUM LACTATE, POTASSIUM CHLORIDE, AND CALCIUM CHLORIDE 1000 ML: .6; .31; .03; .02 INJECTION, SOLUTION INTRAVENOUS at 17:44

## 2025-07-28 RX ADMIN — DIPHENHYDRAMINE HYDROCHLORIDE 25 MG: 50 INJECTION, SOLUTION INTRAMUSCULAR; INTRAVENOUS at 18:36

## 2025-07-28 ASSESSMENT — ACTIVITIES OF DAILY LIVING (ADL)
ADLS_ACUITY_SCORE: 45

## 2025-07-28 ASSESSMENT — COLUMBIA-SUICIDE SEVERITY RATING SCALE - C-SSRS
6. HAVE YOU EVER DONE ANYTHING, STARTED TO DO ANYTHING, OR PREPARED TO DO ANYTHING TO END YOUR LIFE?: NO
2. HAVE YOU ACTUALLY HAD ANY THOUGHTS OF KILLING YOURSELF IN THE PAST MONTH?: NO
1. IN THE PAST MONTH, HAVE YOU WISHED YOU WERE DEAD OR WISHED YOU COULD GO TO SLEEP AND NOT WAKE UP?: NO

## 2025-07-28 NOTE — ED TRIAGE NOTES
This is the 3rd day that pt is in for upper abd pain, and vomiting. Has vomited about 20 times since she left here at 4pm yesterday.  Cannot keep the meds she was given yesterday down as she vomits them up. Rates pain 10/10 now.pt states she passed out today and hit her right arm on the tub. Did not hit head.

## 2025-07-28 NOTE — ED PROVIDER NOTES
EMERGENCY DEPARTMENT ENCOUNTER      NAME: Cassie Morrison  AGE: 34 year old female  YOB: 1990  MRN: [id number]  EVALUATION DATE & TIME: No admission date for patient encounter.    PCP: Merna Yarbrough    ED PROVIDER: David Manley M.D.      Chief Complaint   Patient presents with    Nausea & Vomiting    Abdominal Pain         FINAL IMPRESSION:  1. Cannabinoid hyperemesis syndrome    2. Hypokalemia          ED COURSE & MEDICAL DECISION MAKING:    Pertinent Labs & Imaging studies reviewed. (See chart for details)  34 year old female presents to the Emergency Department for evaluation of vomiting, severe crampy abdominal pain.  Patient with recurrent symptoms over the last few days.  Patient adamant it is not related to her  cannabis use.  Long conversation held regarding hyperemesis related to cannabinoids.  The possibility of this lasting 4 to 6 weeks discussed.  Patient quite distraught at the prospect.  Patient with extensive laboratory evaluation and imaging recently which has been reassuring.  Laboratory evaluation initiated from triage area per protocol.  Patient treated symptomatically patient appears non toxic with stable vitals signs. Overall exam is benign.          6:12 PM I met with the patient for the initial interview and physical examination. Discussed plan for treatment and workup in the ED.    7:45 PM I rechecked and updated the patient.  Patient improved but still having significant discomfort.  Significant other now present.  Will proceed with intravenous morphine and continued outpatient management.  Laboratory results reassuring other than minimal hypokalemia.  Potassium supplementation to be given slowly with ice.  9:13 PM I rechecked and updated the patient.     At the conclusion of the encounter I discussed the results of all of the tests and the disposition. The questions were answered and return precautions provided. The patient or family acknowledged understanding and was  agreeable with the care plan.     Medical Decision Making  I reviewed the EMR: Inpatient Record: ED visit 7/27/25  Care impacted by Alcohol and/or Drug Abuse or Dependence  Discharge. I prescribed additional prescription strength medication(s) as charted. I considered admission, but discharged patient after significant clinical improvement.    MIPS:  Not Applicable    SEPSIS: None         MEDICATIONS GIVEN IN THE EMERGENCY:  Medications   lactated ringers BOLUS 1,000 mL (0 mLs Intravenous Stopped 7/28/25 1854)   pantoprazole (PROTONIX) injection 40 mg (40 mg Intravenous $Given 7/28/25 1737)   prochlorperazine (COMPAZINE) injection 10 mg (10 mg Intravenous $Given 7/28/25 1835)   diphenhydrAMINE (BENADRYL) injection 25 mg (25 mg Intravenous $Given 7/28/25 1836)   diazepam (VALIUM) injection 5 mg (5 mg Intravenous $Given 7/28/25 1836)   famotidine (PEPCID) injection 20 mg (20 mg Intravenous $Given 7/28/25 1836)   fentaNYL (PF) (SUBLIMAZE) injection 100 mcg (100 mcg Intravenous $Given 7/28/25 2025)   potassium chloride (KLOR-CON) Packet 40 mEq (40 mEq Oral $Given 7/28/25 2025)       NEW PRESCRIPTIONS STARTED AT TODAY'S ER VISIT  New Prescriptions    No medications on file          =================================================================    HPI    Patient information was obtained from: patient     Use of Intrepreter: N/A         Cassie Morrison is a 34 year old female with a pertient medical history of Crohn's disease, s/p hysterectomy, bipolar 2 disorder, cannabis use disorder, and cannabinoid hyperemesis syndrome who presents to the ED for evaluation of GI symptoms.    Patient endorses significant nausea, vomiting, and diarrhea. She has been unable to keep anything down. Her vomit looks like thick, green bile. Tiger balm has not helped. She has been taking hot showers and baths. Patient reports excessive alcohol consumption on 7/25/25, but denies recent marijuana smoking. She normally smokes recreationally  "1-2 bowls of marijuana per day.     Per chart review, patient visited [facility] ED on 25 for nausea and vomiting attributable to gastritis and cannabinoid hyperemesis syndrome.      REVIEW OF SYSTEMS   Constitutional:  Denies fever, chills  Respiratory:  Denies productive cough or increased work of breathing  Cardiovascular:  Denies chest pain, palpitations  GI:  Denies change in bowel or bladder habits. Reports nausea, vomiting, and abdominal pain.  Musculoskeletal:  Denies any new muscle/joint swelling  Skin:  Denies rash   Neurologic:  Denies focal weakness  All systems negative except as marked.     PAST MEDICAL HISTORY:  Past Medical History:   Diagnosis Date    Abnormal Pap smear of cervix 2012    Repeat WNL    Allergic     Morphine    Anemia     Anxiety     Chickenpox     x2    Depression     seasonal - been dx since 14    Endometriosis     Endometriosis     dx at 14, 3 laproscopic procedures    Endometriosis     Heart murmur     IUD migration     Kidney stone     1 instance - prior to      Malignant hyperthermia due to anesthesia     Migraine     migraines - back of neck    Papanicolaou smear of cervix with low grade squamous intraepithelial lesion (LGSIL) 12    PID (pelvic inflammatory disease)     in an ER visit, \"got a shot\" - feels like it was misdiagnosed, endometriosis mis-diagnosed?    Postpartum depression 2012    Was doing the postpartum period alone    Suicidal ideation 2015    Urinary tract infection     Varicella     twice as a child       PAST SURGICAL HISTORY:  Past Surgical History:   Procedure Laterality Date     SECTION N/A 2016    Procedure:  SECTION;  Surgeon: Reno Alamo MD;  Location: [facility] L+D OR;  Service:     COLONOSCOPY      COLPOSCOPY      DILATION AND CURETTAGE SUCTION, TREAT INCOMPLETE       D&E    DILATION AND CURETTAGE, OPERATIVE HYSTEROSCOPY, COMBINED N/A 2015    D&C HSC, hysterectomy-2018    " ENDOMETRIAL ABLATION      during one of the laparotomies    [facility] LAPAROSCOPIC IUD REMOVAL  12/24/2015    LAPAROSCOPIC HYSTERECTOMY N/A 1/31/2018    Procedure: ROBOTC TOTAL LAPAROSCOPIC HYSTERECTOMY, BILATERAL SALPINGECTOMY ;  Surgeon: Reno Alamo MD;  Location: [facility] OR;  Service:     LAPAROSCOPY  5/2010    endometriosis    LAPAROTOMY EXPLORATORY      3x - last in 12/2015, 2010, Gyne surgeon in 2015 states no contraindication to vaginal birth per patient    LASER CO2 LAPAROSCOPIC VAPORIZATION ENDOMETRIUM N/A 12/24/2015    vaporization of endometriosis    OTHER SURGICAL HISTORY  12/24/2015    IUD removallaproscopic procedure    [place] LAP,FULGURATE/EXCISE LESIONS Right 5/5/2017    Procedure: LAPAROSCOPY, OVARIAN CYSTECTOMY;  Surgeon: Reno Alamo MD;  Location: [facility];  Service: Gynecology    [facility] ORAL SURGERY PROCEDURE      wisdom teeth x 5         CURRENT MEDICATIONS:    No current facility-administered medications for this encounter.    Current Outpatient Medications:     diazepam (VALIUM) 5 MG tablet, Take 1 tablet (5 mg) by mouth every 6 hours as needed for muscle spasms., Disp: 15 tablet, Rfl: 0    famotidine (PEPCID) 20 MG tablet, Take 1 tablet (20 mg) by mouth 2 times daily as needed (gastritis)., Disp: 30 tablet, Rfl: 0    Ferrous Gluconate 324 (37.5 Fe) MG TABS, Take 1 tablet by mouth daily, Disp: , Rfl:     ondansetron (ZOFRAN ODT) 4 MG ODT tab, Take 1 tablet (4 mg) by mouth every 8 hours as needed for nausea or vomiting., Disp: 10 tablet, Rfl: 0    promethazine (PHENERGAN) 25 MG tablet, Take 1 tablet (25 mg) by mouth every 6 hours as needed for nausea., Disp: 8 tablet, Rfl: 0    ALLERGIES:  Allergies   Allergen Reactions    Blood Transfusion Related (Informational Only) Other (See Comments)     Patient has a history of a clinically significant antibody against RBC antigens.  A delay in compatible RBCs may occur.    Strawberry Extract Rash    Morphine Unknown, Muscle Pain (Myalgia)  "and Nausea and Vomiting     Nerve pain    \"severe nerve pain\"       FAMILY HISTORY:  Family History   Problem Relation Age of Onset    Blood Disease Paternal Grandfather         anemia    Cancer Paternal Grandfather     Diabetes Paternal Grandmother     Cerebrovascular Disease Father     Cerebrovascular Disease Paternal Grandfather     Depression Mother     Alcohol/Drug Mother         meth    Alcohol/Drug Father         drugs    Arthritis Mother     Substance Abuse Mother     Mental Illness Mother     Early Death Father     Mental Illness Sister         ADHD and anger issues    Alcoholism Maternal Aunt     Substance Abuse Maternal Aunt     Hypertension Maternal Aunt     Kidney Disease Maternal Aunt     Early Death Paternal Uncle     Cerebrovascular Disease Paternal Uncle     Heart Disease Maternal Grandmother     Hearing Loss Maternal Grandmother     Arthritis Maternal Grandmother     Dementia Maternal Grandfather     Hypertension Maternal Grandfather     Hypertension Paternal Grandmother     Arthritis Paternal Grandfather     Asthma Paternal Grandfather     Depression Paternal Grandfather     Hearing Loss Paternal Grandfather        SOCIAL HISTORY:   Social History     Socioeconomic History    Marital status: Single    Years of education: come jose   Tobacco Use    Smoking status: Former     Types: Cigarettes    Smokeless tobacco: Never    Tobacco comments:     smoking 10cig/day- down to 1-2 with pregnancy   Vaping Use    Vaping status: Some Days    Substances: Nicotine, Flavoring    Devices: Disposable   Substance and Sexual Activity    Alcohol use: Yes     Alcohol/week: 0.0 standard drinks of alcohol     Comment: 3 times a month    Drug use: No    Sexual activity: Yes     Partners: Male     Birth control/protection: Female Surgical, I.U.D., None     Comment: Pregnant   Other Topics Concern    Parent/sibling w/ CABG, MI or angioplasty before 65F 55M? No     Social Drivers of Health     Financial Resource Strain: " Unknown (8/22/2024)    Financial Resource Strain     Within the past 12 months, have you or your family members you live with been unable to get utilities (heat, electricity) when it was really needed?: Patient declined   Food Insecurity: Unknown (8/22/2024)    Food Insecurity     Within the past 12 months, did you worry that your food would run out before you got money to buy more?: Patient declined     Within the past 12 months, did the food you bought just not last and you didn t have money to get more?: Patient declined   Transportation Needs: Unknown (8/22/2024)    Transportation Needs     Within the past 12 months, has lack of transportation kept you from medical appointments, getting your medicines, non-medical meetings or appointments, work, or from getting things that you need?: Patient declined   Physical Activity: Sufficiently Active (11/14/2022)    Received from [facility]    Exercise Vital Sign     Days of Exercise per Week: 5 days     Minutes of Exercise per Session: 120 min   Stress: Stress Concern Present (11/14/2022)    Received from [facility]    [employer] - Occupational Stress Questionnaire     Feeling of Stress : To some extent   Social Connections: Unknown (11/14/2022)    Received from [facility]    Social Connection and Isolation Panel [NHANES]     Frequency of Communication with Friends and Family: Three times a week     Frequency of Social Gatherings with Friends and Family: Once a week     Attends Christian Services: Patient declined     Active Member of Clubs or Organizations: No     Attends Club or Organization Meetings: Patient declined     Marital Status:    Interpersonal Safety: Low Risk  (8/22/2024)    Interpersonal Safety     Do you feel physically and emotionally safe where you currently live?: Yes     Within the past 12 months, have you been hit, slapped, kicked or otherwise physically hurt by someone?: No     Within the past 12 months, have  "you been humiliated or emotionally abused in other ways by your partner or ex-partner?: No   Housing Stability: Unknown (8/22/2024)    Housing Stability     Do you have housing? : Patient declined     Are you worried about losing your housing?: Patient declined       VITALS:  Patient Vitals for the past 24 hrs:   BP Temp Temp src Pulse Resp SpO2 Height Weight   07/28/25 2045 103/53 -- -- 57 -- 96 % -- --   07/28/25 2030 104/51 -- -- 69 -- 93 % -- --   07/28/25 2015 125/77 -- -- 52 -- 98 % -- --   07/28/25 1924 123/71 -- -- (!) 43 -- 98 % -- --   07/28/25 1909 138/78 -- -- (!) 42 -- 98 % -- --   07/28/25 1854 (!) 140/79 -- -- (!) 47 -- 97 % -- --   07/28/25 1850 (!) 140/77 -- -- 52 -- 98 % -- --   07/28/25 1454 126/82 100.9  F (38.3  C) Tympanic 50 12 98 % 1.715 m (5' 7.5\") 59.4 kg (131 lb)        PHYSICAL EXAM    Constitutional:  Awake, alert, in moderate distress  HENT:  Normocephalic, Atraumatic. Bilateral external ears normal. Oropharynx moist. Nose normal. Neck- Normal range of motion with no guarding, No midline cervical tenderness, Supple, No stridor.   Eyes:  PERRL, EOMI with no signs of entrapment, Conjunctiva normal, No discharge.   Respiratory:  Normal breath sounds, No respiratory distress, No wheezing.    Cardiovascular:  Normal heart rate, Normal rhythm, No appreciable rubs or gallops.   GI:  Soft, No distension, No palpable masses, Diffuse moderate abdominal tenderness.  Musculoskeletal:  Intact distal pulses, No edema. Good range of motion in all major joints. No tenderness to palpation or major deformities noted.  Integument:  Warm, Dry, No erythema, No rash.   Neurologic:  Alert & oriented, Normal motor function, Normal sensory function, No focal deficits noted.   Psychiatric:  Affect normal, Judgment normal, Mood normal.     LAB:  All pertinent labs reviewed and interpreted.  Results for orders placed or performed during the hospital encounter of 07/28/25   Comprehensive Metabolic Panel (Limited " Occurrences)   Result Value Ref Range    Sodium 140 135 - 145 mmol/L    Potassium 3.1 (L) 3.4 - 5.3 mmol/L    Carbon Dioxide (CO2) 23 22 - 29 mmol/L    Anion Gap 11 7 - 15 mmol/L    Urea Nitrogen 10.2 6.0 - 20.0 mg/dL    Creatinine 0.84 0.51 - 0.95 mg/dL    GFR Estimate >90 >60 mL/min/1.73m2    Calcium 9.5 8.8 - 10.4 mg/dL    Chloride 106 98 - 107 mmol/L    Glucose 102 (H) 70 - 99 mg/dL    Alkaline Phosphatase 32 (L) 40 - 150 U/L    AST 30 0 - 45 U/L    ALT 29 0 - 50 U/L    Protein Total 7.7 6.4 - 8.3 g/dL    Albumin 4.3 3.5 - 5.2 g/dL    Bilirubin Total 0.6 <=1.2 mg/dL   Result Value Ref Range    Lipase 18 13 - 60 U/L   Magnesium (Limited Occurrences)   Result Value Ref Range    Magnesium 1.9 1.7 - 2.3 mg/dL   Extra Purple Top Tube   Result Value Ref Range    Hold Specimen JIC    CBC with Platelets (Limited Occurrences)   Result Value Ref Range    WBC Count 7.2 4.0 - 11.0 10e3/uL    RBC Count 4.05 3.80 - 5.20 10e6/uL    Hemoglobin 12.2 11.7 - 15.7 g/dL    Hematocrit 36.6 35.0 - 47.0 %    MCV 90 78 - 100 fL    MCH 30.1 26.5 - 33.0 pg    MCHC 33.3 31.5 - 36.5 g/dL    RDW 12.5 10.0 - 15.0 %    Platelet Count 198 150 - 450 10e3/uL         I, Tricia Polk, am serving as a scribe to document services personally performed by David Malney MD, based on my observation and the provider's statements to me. I, David Manley MD attest that Tricia Polk is acting in a scribe capacity, has observed my performance of the services and has documented them in accordance with my direction.    David Manley M.D.  Emergency Medicine  [facility] EMERGENCY DEPARTMENT      David Manley MD  07/28/25 2122

## 2025-07-29 ENCOUNTER — APPOINTMENT (OUTPATIENT)
Dept: ADMINISTRATIVE | Facility: CLINIC | Age: 35
End: 2025-07-29

## 2025-08-01 LAB
ATRIAL RATE - MUSE: 42 BPM
DIASTOLIC BLOOD PRESSURE - MUSE: NORMAL MMHG
INTERPRETATION ECG - MUSE: NORMAL
P AXIS - MUSE: 56 DEGREES
PR INTERVAL - MUSE: 148 MS
QRS DURATION - MUSE: 86 MS
QT - MUSE: 508 MS
QTC - MUSE: 424 MS
R AXIS - MUSE: 68 DEGREES
SYSTOLIC BLOOD PRESSURE - MUSE: NORMAL MMHG
T AXIS - MUSE: 44 DEGREES
VENTRICULAR RATE- MUSE: 42 BPM